# Patient Record
Sex: MALE | Race: BLACK OR AFRICAN AMERICAN | NOT HISPANIC OR LATINO | Employment: FULL TIME | URBAN - METROPOLITAN AREA
[De-identification: names, ages, dates, MRNs, and addresses within clinical notes are randomized per-mention and may not be internally consistent; named-entity substitution may affect disease eponyms.]

---

## 2024-07-05 ENCOUNTER — APPOINTMENT (OUTPATIENT)
Dept: RADIOLOGY | Facility: HOSPITAL | Age: 44
End: 2024-07-05
Payer: MEDICAID

## 2024-07-05 PROBLEM — S72.492B: Status: ACTIVE | Noted: 2024-07-05

## 2024-07-05 PROBLEM — S81.032A: Status: ACTIVE | Noted: 2024-07-05

## 2024-07-05 PROCEDURE — 71045 X-RAY EXAM CHEST 1 VIEW: CPT

## 2024-07-05 PROCEDURE — 73706 CT ANGIO LWR EXTR W/O&W/DYE: CPT | Mod: LT

## 2024-07-05 PROCEDURE — 73590 X-RAY EXAM OF LOWER LEG: CPT | Mod: LT

## 2024-07-05 PROCEDURE — 73564 X-RAY EXAM KNEE 4 OR MORE: CPT | Mod: LT

## 2024-07-05 PROCEDURE — 76377 3D RENDER W/INTRP POSTPROCES: CPT

## 2024-07-05 PROCEDURE — 73560 X-RAY EXAM OF KNEE 1 OR 2: CPT | Mod: LT

## 2024-07-05 PROCEDURE — 73552 X-RAY EXAM OF FEMUR 2/>: CPT | Mod: LT

## 2024-07-06 ENCOUNTER — CLINICAL SUPPORT (OUTPATIENT)
Dept: EMERGENCY MEDICINE | Facility: HOSPITAL | Age: 44
End: 2024-07-06
Payer: MEDICAID

## 2024-07-06 ENCOUNTER — APPOINTMENT (OUTPATIENT)
Dept: RADIOLOGY | Facility: HOSPITAL | Age: 44
End: 2024-07-06
Payer: MEDICAID

## 2024-07-06 PROCEDURE — 93005 ELECTROCARDIOGRAM TRACING: CPT

## 2024-07-14 ENCOUNTER — APPOINTMENT (OUTPATIENT)
Dept: CARDIOLOGY | Facility: HOSPITAL | Age: 44
End: 2024-07-14
Payer: MEDICAID

## 2024-07-14 PROCEDURE — 93005 ELECTROCARDIOGRAM TRACING: CPT

## 2024-07-15 ENCOUNTER — APPOINTMENT (OUTPATIENT)
Dept: CARDIOLOGY | Facility: HOSPITAL | Age: 44
End: 2024-07-15
Payer: MEDICAID

## 2024-07-15 PROCEDURE — 93005 ELECTROCARDIOGRAM TRACING: CPT

## 2024-07-19 ENCOUNTER — PHARMACY VISIT (OUTPATIENT)
Dept: PHARMACY | Facility: CLINIC | Age: 44
End: 2024-07-19
Payer: COMMERCIAL

## 2024-07-19 PROCEDURE — RXMED WILLOW AMBULATORY MEDICATION CHARGE

## 2024-07-23 ENCOUNTER — TELEPHONE (OUTPATIENT)
Dept: IMMUNOLOGY | Facility: CLINIC | Age: 44
End: 2024-07-23
Payer: MEDICAID

## 2024-07-23 NOTE — TELEPHONE ENCOUNTER
Time Spent: 15 mins   Pt referred to [facility] by  (Alison Donahue)   [facility] reached out to pt. Pt available to answer call.  Pt stated that he is currently taking medication and is aware of status.   Pt identified feeling not ready or pressured to f/U for care.   EIS shared contact information to be available at pts request.     PLAN:   EIS will f/U with RN team for another approach to link to care.

## 2024-07-30 ENCOUNTER — OFFICE VISIT (OUTPATIENT)
Dept: ORTHOPEDIC SURGERY | Facility: CLINIC | Age: 44
End: 2024-07-30
Payer: COMMERCIAL

## 2024-07-30 ENCOUNTER — HOSPITAL ENCOUNTER (OUTPATIENT)
Dept: RADIOLOGY | Facility: CLINIC | Age: 44
Discharge: HOME | End: 2024-07-30
Payer: COMMERCIAL

## 2024-07-30 VITALS — WEIGHT: 154 LBS | HEIGHT: 71 IN | BODY MASS INDEX: 21.56 KG/M2

## 2024-07-30 DIAGNOSIS — S72.90XA: Primary | ICD-10-CM

## 2024-07-30 DIAGNOSIS — S72.90XA: ICD-10-CM

## 2024-07-30 PROCEDURE — 73552 X-RAY EXAM OF FEMUR 2/>: CPT | Mod: LT

## 2024-07-30 PROCEDURE — 73552 X-RAY EXAM OF FEMUR 2/>: CPT | Mod: LEFT SIDE | Performed by: RADIOLOGY

## 2024-07-30 PROCEDURE — 73560 X-RAY EXAM OF KNEE 1 OR 2: CPT | Mod: LEFT SIDE | Performed by: RADIOLOGY

## 2024-07-30 PROCEDURE — 99211 OFF/OP EST MAY X REQ PHY/QHP: CPT | Performed by: PHYSICIAN ASSISTANT

## 2024-07-30 PROCEDURE — 73560 X-RAY EXAM OF KNEE 1 OR 2: CPT | Mod: LT

## 2024-07-30 RX ORDER — OXYCODONE HYDROCHLORIDE 5 MG/1
5 TABLET ORAL EVERY 6 HOURS
Qty: 28 TABLET | Refills: 0 | Status: SHIPPED | OUTPATIENT
Start: 2024-07-30 | End: 2024-08-06

## 2024-07-30 ASSESSMENT — PAIN - FUNCTIONAL ASSESSMENT: PAIN_FUNCTIONAL_ASSESSMENT: 0-10

## 2024-07-30 ASSESSMENT — PAIN SCALES - GENERAL: PAINLEVEL_OUTOF10: 10 - WORST POSSIBLE PAIN

## 2024-07-30 ASSESSMENT — PAIN DESCRIPTION - DESCRIPTORS: DESCRIPTORS: ACHING;BURNING;SHARP;SHOOTING;THROBBING
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
## 2024-08-08 ENCOUNTER — APPOINTMENT (OUTPATIENT)
Dept: SURGERY | Facility: CLINIC | Age: 44
End: 2024-08-08
Payer: MEDICAID

## 2024-08-29 ENCOUNTER — APPOINTMENT (OUTPATIENT)
Dept: PRIMARY CARE | Facility: CLINIC | Age: 44
End: 2024-08-29
Payer: MEDICAID

## 2024-09-27 ENCOUNTER — OFFICE VISIT (OUTPATIENT)
Dept: ORTHOPEDIC SURGERY | Facility: HOSPITAL | Age: 44
End: 2024-09-27
Payer: COMMERCIAL

## 2024-09-27 ENCOUNTER — HOSPITAL ENCOUNTER (OUTPATIENT)
Dept: RADIOLOGY | Facility: HOSPITAL | Age: 44
Discharge: HOME | End: 2024-09-27
Payer: COMMERCIAL

## 2024-09-27 DIAGNOSIS — S72.492B: ICD-10-CM

## 2024-09-27 DIAGNOSIS — S72.492B: Primary | ICD-10-CM

## 2024-09-27 PROCEDURE — 73560 X-RAY EXAM OF KNEE 1 OR 2: CPT | Mod: LT

## 2024-09-27 PROCEDURE — 99211 OFF/OP EST MAY X REQ PHY/QHP: CPT | Performed by: PHYSICIAN ASSISTANT

## 2024-09-27 PROCEDURE — 73552 X-RAY EXAM OF FEMUR 2/>: CPT | Mod: LT

## 2024-09-27 ASSESSMENT — PAIN - FUNCTIONAL ASSESSMENT: PAIN_FUNCTIONAL_ASSESSMENT: 0-10

## 2024-09-27 ASSESSMENT — PAIN DESCRIPTION - DESCRIPTORS: DESCRIPTORS: THROBBING

## 2024-09-27 ASSESSMENT — PAIN SCALES - GENERAL: PAINLEVEL_OUTOF10: 10 - WORST POSSIBLE PAIN

## 2024-10-01 NOTE — PROGRESS NOTES
History of Present Illness   Romaine Rodgers is a 44 y.o. male presenting today for post-op check from IMN/ORIF L distal femur fx on 24. Continues to do well and no longer needing narcotics for pain control. Incisions are well healing without redness or drainage. Compliant with WB recommendations. Denies numbness, tingling, f/c, CP, SOB or any other complaints or concerns.      Past Medical History:   Diagnosis Date    HIV disease (Multi)        Medication Documentation Review Audit       Reviewed by Patrica Arriola MA (Medical Assistant) on 24 at 0856      Medication Order Taking? Sig Documenting Provider Last Dose Status   gabapentin (Neurontin) 300 mg capsule [id number]  Take 1 capsule (300 mg) by mouth every 8 hours for 14 days. Yissel Eid MD   24 235   methocarbamol (Robaxin) 750 mg tablet [id number]  Take 1 tablet (750 mg) by mouth every 6 hours for 7 days. Yissel Eid MD   24 235   nicotine (Nicoderm CQ) 14 mg/24 hr patch [id number] Yes Place 1 patch over 24 hours on the skin once daily. Yissel Eid MD Taking Active   OLANZapine (ZyPREXA) 5 mg tablet [id number]  Take 1 tablet (5 mg) by mouth 2 times a day for 14 days. Yissel Eid MD   24 235   traZODone (Desyrel) 50 mg tablet [id number]  Take 0.5 tablets (25 mg) by mouth as needed at bedtime for sleep (only if melatonin was taken and not taking effect before midnight) for up to 14 days. Yissel Eid MD   24 235   valproic acid (Depakene) 250 mg capsule [id number]  Take 2 capsules (500 mg) by mouth 2 times a day for 14 days. Yissel Eid MD   24 235                    No Known Allergies    Social History     Socioeconomic History    Marital status: Single     Spouse name: Not on file    Number of children: Not on file    Years of education: Not on file    Highest education level: Not on file   Occupational History    Not on file   Tobacco Use    Smoking status: Every  Day     Current packs/day: 1.00     Types: Cigarettes    Smokeless tobacco: Not on file   Substance and Sexual Activity    Alcohol use: Yes    Drug use: Yes     Types: Marijuana    Sexual activity: Defer   Other Topics Concern    Not on file   Social History Narrative    Not on file     Social Determinants of Health     Financial Resource Strain: Patient Declined (7/17/2024)    Overall Financial Resource Strain (CARDIA)     Difficulty of Paying Living Expenses: Patient declined   Food Insecurity: Patient Declined (7/17/2024)    Hunger Vital Sign     Worried About Running Out of Food in the Last Year: Patient declined     Ran Out of Food in the Last Year: Patient declined   Transportation Needs: Patient Declined (7/17/2024)    PRAPARE - Transportation     Lack of Transportation (Medical): Patient declined     Lack of Transportation (Non-Medical): Patient declined   Physical Activity: Patient Declined (7/17/2024)    Exercise Vital Sign     Days of Exercise per Week: Patient declined     Minutes of Exercise per Session: Patient declined   Stress: Patient Declined (7/17/2024)    [employer] - Occupational Stress Questionnaire     Feeling of Stress : Patient declined   Social Connections: Patient Declined (7/17/2024)    Social Connection and Isolation Panel [NHANES]     Frequency of Communication with Friends and Family: Patient declined     Frequency of Social Gatherings with Friends and Family: Patient declined     Attends Muslim Services: Patient declined     Active Member of Clubs or Organizations: Patient declined     Attends Club or Organization Meetings: Patient declined     Marital Status: Patient declined   Intimate Partner Violence: Patient Declined (7/17/2024)    Humiliation, Afraid, Rape, and Kick questionnaire     Fear of Current or Ex-Partner: Patient declined     Emotionally Abused: Patient declined     Physically Abused: Patient declined     Sexually Abused: Patient  declined   Housing Stability: Patient Declined (7/17/2024)    Housing Stability Vital Sign     Unable to Pay for Housing in the Last Year: Patient declined     Number of Times Moved in the Last Year: 0     Homeless in the Last Year: Patient declined       History reviewed. No pertinent surgical history.       Review of Systems:  30 point ROS reviewed and negative other than as listed in the HPI     Physical Exam:  Gen: The pt is A&Ox3, NAD, and appear state age and weight  Psychiatric: mood and affect are appropriate   Eyes: sclera are white, EOM grossly intact  ENT: MMM  Neck: supple, thyroid is midline  Respiratory: respirations are nonlabored, chest rise symmetric  CV: rate is regular by palpation of distal pulses  Abdomen: nondistended   Integument: no obvious cutaneous lesions noted. No signs of lymphangitis. No signs of systemic edema.   MSK: left lower leg surgical incision well healing without edema, erythema, drainage, or other s/sx of infection. Appropriately tender to palpation around the incision. SILT throughout the leg intact. Intact plantarflexion and dorsiflexion. Foot warm and well perfused.      Imaging:  I personally reviewed multiple views of the  L knee and femur  were obtained in the office today demonstrate maintenance of reduction, interval healing, and a stable position of the hardware.      Assessment   44 y.o. male post-op from ORIF/IMN L distal femur fx on 7/6/24.     Plan:  Continue WBAT on  LLE and ROM as tolerated ; referral for outpatient PT placed    Follow-up 6 weeks with 2 views left knee and 2 views L femur.     All of the patient's questions/concerns address and they are in agreement with the plan.

## 2024-10-14 ENCOUNTER — APPOINTMENT (OUTPATIENT)
Dept: RADIOLOGY | Facility: HOSPITAL | Age: 44
DRG: 856 | End: 2024-10-14
Payer: COMMERCIAL

## 2024-10-14 ENCOUNTER — HOSPITAL ENCOUNTER (INPATIENT)
Facility: HOSPITAL | Age: 44
LOS: 6 days | Discharge: HOME | End: 2024-10-20
Attending: STUDENT IN AN ORGANIZED HEALTH CARE EDUCATION/TRAINING PROGRAM | Admitting: STUDENT IN AN ORGANIZED HEALTH CARE EDUCATION/TRAINING PROGRAM
Payer: COMMERCIAL

## 2024-10-14 DIAGNOSIS — M00.9 PYOGENIC ARTHRITIS OF LEFT KNEE JOINT, DUE TO UNSPECIFIED ORGANISM (MULTI): ICD-10-CM

## 2024-10-14 DIAGNOSIS — T81.40XS: ICD-10-CM

## 2024-10-14 DIAGNOSIS — J18.9 PNEUMONIA DUE TO INFECTIOUS ORGANISM, UNSPECIFIED LATERALITY, UNSPECIFIED PART OF LUNG: Primary | ICD-10-CM

## 2024-10-14 DIAGNOSIS — B37.0 ORAL THRUSH: ICD-10-CM

## 2024-10-14 DIAGNOSIS — S81.032A GUNSHOT WOUND OF LEFT KNEE, INITIAL ENCOUNTER: ICD-10-CM

## 2024-10-14 DIAGNOSIS — Z21 HIV INFECTION, UNSPECIFIED SYMPTOM STATUS: ICD-10-CM

## 2024-10-14 PROBLEM — R19.7 DIARRHEA OF PRESUMED INFECTIOUS ORIGIN: Status: ACTIVE | Noted: 2024-10-14

## 2024-10-14 PROBLEM — N17.9 AKI (ACUTE KIDNEY INJURY) (CMS-HCC): Status: ACTIVE | Noted: 2024-10-14

## 2024-10-14 PROBLEM — B20: Status: ACTIVE | Noted: 2024-10-14

## 2024-10-14 PROBLEM — F19.10 POLYSUBSTANCE ABUSE (MULTI): Status: ACTIVE | Noted: 2024-10-14

## 2024-10-14 PROBLEM — Z65.9 MEDICATION NONADHERENCE DUE TO PSYCHOSOCIAL PROBLEM: Status: ACTIVE | Noted: 2024-10-14

## 2024-10-14 PROBLEM — E87.6 HYPOKALEMIA: Status: ACTIVE | Noted: 2024-10-14

## 2024-10-14 PROBLEM — F43.10 PTSD (POST-TRAUMATIC STRESS DISORDER): Status: ACTIVE | Noted: 2024-10-14

## 2024-10-14 PROBLEM — Z91.148 MEDICATION NONADHERENCE DUE TO PSYCHOSOCIAL PROBLEM: Status: ACTIVE | Noted: 2024-10-14

## 2024-10-14 PROBLEM — J17: Status: ACTIVE | Noted: 2024-10-14

## 2024-10-14 PROBLEM — F31.9 BIPOLAR 1 DISORDER (MULTI): Status: ACTIVE | Noted: 2024-10-14

## 2024-10-14 LAB
ALBUMIN SERPL BCP-MCNC: 3.2 G/DL (ref 3.4–5)
ALP SERPL-CCNC: 71 U/L (ref 33–120)
ALT SERPL W P-5'-P-CCNC: 6 U/L (ref 10–52)
ANION GAP SERPL CALC-SCNC: 13 MMOL/L (ref 10–20)
AST SERPL W P-5'-P-CCNC: 16 U/L (ref 9–39)
BASOPHILS # BLD AUTO: 0.01 X10*3/UL (ref 0–0.1)
BASOPHILS # BLD AUTO: 0.02 X10*3/UL (ref 0–0.1)
BASOPHILS NFR BLD AUTO: 0.1 %
BASOPHILS NFR BLD AUTO: 0.2 %
BILIRUB SERPL-MCNC: 0.7 MG/DL (ref 0–1.2)
BUN SERPL-MCNC: 22 MG/DL (ref 6–23)
CALCIUM SERPL-MCNC: 9.3 MG/DL (ref 8.6–10.6)
CHLORIDE SERPL-SCNC: 96 MMOL/L (ref 98–107)
CO2 SERPL-SCNC: 25 MMOL/L (ref 21–32)
CREAT SERPL-MCNC: 1.31 MG/DL (ref 0.5–1.3)
EGFRCR SERPLBLD CKD-EPI 2021: 70 ML/MIN/1.73M*2
EOSINOPHIL # BLD AUTO: 0 X10*3/UL (ref 0–0.7)
EOSINOPHIL # BLD AUTO: 0 X10*3/UL (ref 0–0.7)
EOSINOPHIL NFR BLD AUTO: 0 %
EOSINOPHIL NFR BLD AUTO: 0 %
ERYTHROCYTE [DISTWIDTH] IN BLOOD BY AUTOMATED COUNT: 11.9 % (ref 11.5–14.5)
ERYTHROCYTE [DISTWIDTH] IN BLOOD BY AUTOMATED COUNT: 11.9 % (ref 11.5–14.5)
FLUAV RNA RESP QL NAA+PROBE: NOT DETECTED
FLUBV RNA RESP QL NAA+PROBE: NOT DETECTED
GLUCOSE SERPL-MCNC: 115 MG/DL (ref 74–99)
HCT VFR BLD AUTO: 37.3 % (ref 41–52)
HCT VFR BLD AUTO: 37.6 % (ref 41–52)
HCV AB SER QL: NONREACTIVE
HGB BLD-MCNC: 12.8 G/DL (ref 13.5–17.5)
HGB BLD-MCNC: 12.9 G/DL (ref 13.5–17.5)
HOLD SPECIMEN: NORMAL
HOLD SPECIMEN: NORMAL
IMM GRANULOCYTES # BLD AUTO: 0.05 X10*3/UL (ref 0–0.7)
IMM GRANULOCYTES # BLD AUTO: 0.05 X10*3/UL (ref 0–0.7)
IMM GRANULOCYTES NFR BLD AUTO: 0.4 % (ref 0–0.9)
IMM GRANULOCYTES NFR BLD AUTO: 0.4 % (ref 0–0.9)
LYMPHOCYTES # BLD AUTO: 0.51 X10*3/UL (ref 1.2–4.8)
LYMPHOCYTES # BLD AUTO: 0.55 X10*3/UL (ref 1.2–4.8)
LYMPHOCYTES NFR BLD AUTO: 4.5 %
LYMPHOCYTES NFR BLD AUTO: 4.9 %
MCH RBC QN AUTO: 33.4 PG (ref 26–34)
MCH RBC QN AUTO: 33.4 PG (ref 26–34)
MCHC RBC AUTO-ENTMCNC: 34.3 G/DL (ref 32–36)
MCHC RBC AUTO-ENTMCNC: 34.3 G/DL (ref 32–36)
MCV RBC AUTO: 97 FL (ref 80–100)
MCV RBC AUTO: 97 FL (ref 80–100)
MONOCYTES # BLD AUTO: 0.55 X10*3/UL (ref 0.1–1)
MONOCYTES # BLD AUTO: 0.57 X10*3/UL (ref 0.1–1)
MONOCYTES NFR BLD AUTO: 4.9 %
MONOCYTES NFR BLD AUTO: 5 %
NEUTROPHILS # BLD AUTO: 10 X10*3/UL (ref 1.2–7.7)
NEUTROPHILS # BLD AUTO: 10.29 X10*3/UL (ref 1.2–7.7)
NEUTROPHILS NFR BLD AUTO: 89.7 %
NEUTROPHILS NFR BLD AUTO: 89.9 %
NRBC BLD-RTO: 0 /100 WBCS (ref 0–0)
NRBC BLD-RTO: 0 /100 WBCS (ref 0–0)
PLATELET # BLD AUTO: 232 X10*3/UL (ref 150–450)
PLATELET # BLD AUTO: 249 X10*3/UL (ref 150–450)
POTASSIUM SERPL-SCNC: 3 MMOL/L (ref 3.5–5.3)
PROT SERPL-MCNC: 8.9 G/DL (ref 6.4–8.2)
PROT SERPL-MCNC: 9 G/DL (ref 6.4–8.2)
RBC # BLD AUTO: 3.83 X10*6/UL (ref 4.5–5.9)
RBC # BLD AUTO: 3.86 X10*6/UL (ref 4.5–5.9)
RBC MORPH BLD: NORMAL
RBC MORPH BLD: NORMAL
SARS-COV-2 RNA RESP QL NAA+PROBE: NOT DETECTED
SODIUM SERPL-SCNC: 131 MMOL/L (ref 136–145)
WBC # BLD AUTO: 11.2 X10*3/UL (ref 4.4–11.3)
WBC # BLD AUTO: 11.4 X10*3/UL (ref 4.4–11.3)

## 2024-10-14 PROCEDURE — 99285 EMERGENCY DEPT VISIT HI MDM: CPT | Mod: 25

## 2024-10-14 PROCEDURE — 71250 CT THORAX DX C-: CPT | Performed by: STUDENT IN AN ORGANIZED HEALTH CARE EDUCATION/TRAINING PROGRAM

## 2024-10-14 PROCEDURE — 2500000004 HC RX 250 GENERAL PHARMACY W/ HCPCS (ALT 636 FOR OP/ED)

## 2024-10-14 PROCEDURE — 99285 EMERGENCY DEPT VISIT HI MDM: CPT | Performed by: STUDENT IN AN ORGANIZED HEALTH CARE EDUCATION/TRAINING PROGRAM

## 2024-10-14 PROCEDURE — 2500000001 HC RX 250 WO HCPCS SELF ADMINISTERED DRUGS (ALT 637 FOR MEDICARE OP)

## 2024-10-14 PROCEDURE — 86780 TREPONEMA PALLIDUM: CPT | Performed by: NUTRITIONIST

## 2024-10-14 PROCEDURE — 87636 SARSCOV2 & INF A&B AMP PRB: CPT

## 2024-10-14 PROCEDURE — 84165 PROTEIN E-PHORESIS SERUM: CPT

## 2024-10-14 PROCEDURE — 87109 MYCOPLASMA: CPT

## 2024-10-14 PROCEDURE — 84155 ASSAY OF PROTEIN SERUM: CPT

## 2024-10-14 PROCEDURE — 86803 HEPATITIS C AB TEST: CPT

## 2024-10-14 PROCEDURE — 71046 X-RAY EXAM CHEST 2 VIEWS: CPT

## 2024-10-14 PROCEDURE — 36415 COLL VENOUS BLD VENIPUNCTURE: CPT

## 2024-10-14 PROCEDURE — 87536 HIV-1 QUANT&REVRSE TRNSCRPJ: CPT

## 2024-10-14 PROCEDURE — 73590 X-RAY EXAM OF LOWER LEG: CPT | Mod: LT

## 2024-10-14 PROCEDURE — 1100000001 HC PRIVATE ROOM DAILY

## 2024-10-14 PROCEDURE — 86334 IMMUNOFIX E-PHORESIS SERUM: CPT

## 2024-10-14 PROCEDURE — 96365 THER/PROPH/DIAG IV INF INIT: CPT

## 2024-10-14 PROCEDURE — 96367 TX/PROPH/DG ADDL SEQ IV INF: CPT

## 2024-10-14 PROCEDURE — 87899 AGENT NOS ASSAY W/OPTIC: CPT

## 2024-10-14 PROCEDURE — 87081 CULTURE SCREEN ONLY: CPT | Performed by: STUDENT IN AN ORGANIZED HEALTH CARE EDUCATION/TRAINING PROGRAM

## 2024-10-14 PROCEDURE — 85025 COMPLETE CBC W/AUTO DIFF WBC: CPT

## 2024-10-14 PROCEDURE — 73552 X-RAY EXAM OF FEMUR 2/>: CPT | Mod: LEFT SIDE | Performed by: RADIOLOGY

## 2024-10-14 PROCEDURE — 2500000002 HC RX 250 W HCPCS SELF ADMINISTERED DRUGS (ALT 637 FOR MEDICARE OP, ALT 636 FOR OP/ED)

## 2024-10-14 PROCEDURE — 88185 FLOWCYTOMETRY/TC ADD-ON: CPT

## 2024-10-14 PROCEDURE — 80053 COMPREHEN METABOLIC PANEL: CPT

## 2024-10-14 PROCEDURE — 73564 X-RAY EXAM KNEE 4 OR MORE: CPT | Mod: LEFT SIDE | Performed by: RADIOLOGY

## 2024-10-14 PROCEDURE — 87449 NOS EACH ORGANISM AG IA: CPT

## 2024-10-14 PROCEDURE — 71046 X-RAY EXAM CHEST 2 VIEWS: CPT | Performed by: RADIOLOGY

## 2024-10-14 PROCEDURE — 2500000005 HC RX 250 GENERAL PHARMACY W/O HCPCS

## 2024-10-14 PROCEDURE — 73564 X-RAY EXAM KNEE 4 OR MORE: CPT | Mod: LT

## 2024-10-14 PROCEDURE — 71250 CT THORAX DX C-: CPT

## 2024-10-14 PROCEDURE — 96375 TX/PRO/DX INJ NEW DRUG ADDON: CPT

## 2024-10-14 PROCEDURE — 73552 X-RAY EXAM OF FEMUR 2/>: CPT | Mod: LT

## 2024-10-14 PROCEDURE — 2500000004 HC RX 250 GENERAL PHARMACY W/ HCPCS (ALT 636 FOR OP/ED): Performed by: STUDENT IN AN ORGANIZED HEALTH CARE EDUCATION/TRAINING PROGRAM

## 2024-10-14 PROCEDURE — 73590 X-RAY EXAM OF LOWER LEG: CPT | Mod: LEFT SIDE | Performed by: RADIOLOGY

## 2024-10-14 PROCEDURE — 99223 1ST HOSP IP/OBS HIGH 75: CPT

## 2024-10-14 PROCEDURE — 86320 SERUM IMMUNOELECTROPHORESIS: CPT

## 2024-10-14 PROCEDURE — 87205 SMEAR GRAM STAIN: CPT

## 2024-10-14 PROCEDURE — 2500000001 HC RX 250 WO HCPCS SELF ADMINISTERED DRUGS (ALT 637 FOR MEDICARE OP): Performed by: STUDENT IN AN ORGANIZED HEALTH CARE EDUCATION/TRAINING PROGRAM

## 2024-10-14 RX ORDER — ONDANSETRON 4 MG/1
4 TABLET, ORALLY DISINTEGRATING ORAL ONCE
Status: COMPLETED | OUTPATIENT
Start: 2024-10-14 | End: 2024-10-14

## 2024-10-14 RX ORDER — GABAPENTIN 300 MG/1
300 CAPSULE ORAL EVERY 8 HOURS SCHEDULED
Status: DISCONTINUED | OUTPATIENT
Start: 2024-10-14 | End: 2024-10-20 | Stop reason: HOSPADM

## 2024-10-14 RX ORDER — ENOXAPARIN SODIUM 100 MG/ML
40 INJECTION SUBCUTANEOUS EVERY 24 HOURS
Status: DISCONTINUED | OUTPATIENT
Start: 2024-10-14 | End: 2024-10-20 | Stop reason: HOSPADM

## 2024-10-14 RX ORDER — ACETAMINOPHEN 325 MG/1
650 TABLET ORAL EVERY 4 HOURS PRN
Status: DISCONTINUED | OUTPATIENT
Start: 2024-10-14 | End: 2024-10-18

## 2024-10-14 RX ORDER — POLYETHYLENE GLYCOL 3350 17 G/17G
17 POWDER, FOR SOLUTION ORAL DAILY
Status: DISCONTINUED | OUTPATIENT
Start: 2024-10-14 | End: 2024-10-20 | Stop reason: HOSPADM

## 2024-10-14 RX ORDER — POTASSIUM CHLORIDE 20 MEQ/1
40 TABLET, EXTENDED RELEASE ORAL ONCE
Status: COMPLETED | OUTPATIENT
Start: 2024-10-14 | End: 2024-10-14

## 2024-10-14 RX ORDER — VALPROIC ACID 250 MG/1
500 CAPSULE, LIQUID FILLED ORAL 2 TIMES DAILY
Status: DISCONTINUED | OUTPATIENT
Start: 2024-10-14 | End: 2024-10-17

## 2024-10-14 RX ORDER — POTASSIUM CHLORIDE 20 MEQ/1
20 TABLET, EXTENDED RELEASE ORAL ONCE
Status: COMPLETED | OUTPATIENT
Start: 2024-10-14 | End: 2024-10-14

## 2024-10-14 RX ORDER — ACETAMINOPHEN 325 MG/1
975 TABLET ORAL ONCE
Status: COMPLETED | OUTPATIENT
Start: 2024-10-14 | End: 2024-10-14

## 2024-10-14 RX ORDER — TRAZODONE HYDROCHLORIDE 50 MG/1
25 TABLET ORAL NIGHTLY PRN
Status: DISCONTINUED | OUTPATIENT
Start: 2024-10-14 | End: 2024-10-20 | Stop reason: HOSPADM

## 2024-10-14 RX ORDER — AZITHROMYCIN 500 MG/1
500 TABLET, FILM COATED ORAL
Status: DISCONTINUED | OUTPATIENT
Start: 2024-10-15 | End: 2024-10-17

## 2024-10-14 RX ORDER — CEFTRIAXONE 1 G/50ML
1 INJECTION, SOLUTION INTRAVENOUS ONCE
Status: COMPLETED | OUTPATIENT
Start: 2024-10-14 | End: 2024-10-14

## 2024-10-14 RX ORDER — KETOROLAC TROMETHAMINE 30 MG/ML
30 INJECTION, SOLUTION INTRAMUSCULAR; INTRAVENOUS ONCE
Status: COMPLETED | OUTPATIENT
Start: 2024-10-14 | End: 2024-10-14

## 2024-10-14 RX ORDER — POTASSIUM CHLORIDE 20 MEQ/1
40 TABLET, EXTENDED RELEASE ORAL ONCE
Status: DISCONTINUED | OUTPATIENT
Start: 2024-10-14 | End: 2024-10-14

## 2024-10-14 RX ORDER — OLANZAPINE 5 MG/1
5 TABLET ORAL 2 TIMES DAILY
Status: DISCONTINUED | OUTPATIENT
Start: 2024-10-14 | End: 2024-10-20 | Stop reason: HOSPADM

## 2024-10-14 RX ORDER — ONDANSETRON HYDROCHLORIDE 2 MG/ML
4 INJECTION, SOLUTION INTRAVENOUS ONCE
Status: COMPLETED | OUTPATIENT
Start: 2024-10-14 | End: 2024-10-14

## 2024-10-14 RX ORDER — CEFTRIAXONE 1 G/50ML
1 INJECTION, SOLUTION INTRAVENOUS EVERY 24 HOURS
Status: DISCONTINUED | OUTPATIENT
Start: 2024-10-15 | End: 2024-10-16

## 2024-10-14 ASSESSMENT — PAIN DESCRIPTION - ORIENTATION
ORIENTATION: LEFT
ORIENTATION: LEFT

## 2024-10-14 ASSESSMENT — PAIN SCALES - GENERAL
PAINLEVEL_OUTOF10: 6
PAINLEVEL_OUTOF10: 10 - WORST POSSIBLE PAIN

## 2024-10-14 ASSESSMENT — PAIN DESCRIPTION - PAIN TYPE
TYPE: CHRONIC PAIN
TYPE: CHRONIC PAIN

## 2024-10-14 ASSESSMENT — PAIN - FUNCTIONAL ASSESSMENT
PAIN_FUNCTIONAL_ASSESSMENT: 0-10
PAIN_FUNCTIONAL_ASSESSMENT: 0-10

## 2024-10-14 ASSESSMENT — LIFESTYLE VARIABLES
EVER FELT BAD OR GUILTY ABOUT YOUR DRINKING: NO
HAVE YOU EVER FELT YOU SHOULD CUT DOWN ON YOUR DRINKING: NO
EVER HAD A DRINK FIRST THING IN THE MORNING TO STEADY YOUR NERVES TO GET RID OF A HANGOVER: NO
HAVE PEOPLE ANNOYED YOU BY CRITICIZING YOUR DRINKING: NO
TOTAL SCORE: 0

## 2024-10-14 ASSESSMENT — PAIN DESCRIPTION - LOCATION
LOCATION: LEG
LOCATION: LEG

## 2024-10-14 ASSESSMENT — COLUMBIA-SUICIDE SEVERITY RATING SCALE - C-SSRS
1. IN THE PAST MONTH, HAVE YOU WISHED YOU WERE DEAD OR WISHED YOU COULD GO TO SLEEP AND NOT WAKE UP?: NO
6. HAVE YOU EVER DONE ANYTHING, STARTED TO DO ANYTHING, OR PREPARED TO DO ANYTHING TO END YOUR LIFE?: NO
2. HAVE YOU ACTUALLY HAD ANY THOUGHTS OF KILLING YOURSELF?: NO
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
## 2024-10-15 LAB
ALBUMIN SERPL BCP-MCNC: 2.9 G/DL (ref 3.4–5)
ANION GAP SERPL CALC-SCNC: 14 MMOL/L (ref 10–20)
BACTERIA SPEC RESP CULT: ABNORMAL
BASOPHILS # BLD AUTO: 0.01 X10*3/UL (ref 0–0.1)
BASOPHILS NFR BLD AUTO: 0.1 %
BUN SERPL-MCNC: 26 MG/DL (ref 6–23)
CALCIUM SERPL-MCNC: 9.4 MG/DL (ref 8.6–10.6)
CD3+CD4+ CELLS # BLD: 0.18 X10E9/L
CD3+CD4+ CELLS # BLD: 179 /MM3
CD3+CD4+ CELLS NFR BLD: 35 %
CD3+CD4+ CELLS/CD3+CD8+ CLL BLD: 1 %
CD3+CD8+ CELLS # BLD: 0.18 X10E9/L
CD3+CD8+ CELLS NFR BLD: 35 %
CHLORIDE SERPL-SCNC: 100 MMOL/L (ref 98–107)
CO2 SERPL-SCNC: 23 MMOL/L (ref 21–32)
CREAT SERPL-MCNC: 1.3 MG/DL (ref 0.5–1.3)
EGFRCR SERPLBLD CKD-EPI 2021: 70 ML/MIN/1.73M*2
EOSINOPHIL # BLD AUTO: 0.02 X10*3/UL (ref 0–0.7)
EOSINOPHIL NFR BLD AUTO: 0.2 %
ERYTHROCYTE [DISTWIDTH] IN BLOOD BY AUTOMATED COUNT: 11.8 % (ref 11.5–14.5)
GLUCOSE SERPL-MCNC: 116 MG/DL (ref 74–99)
GRAM STN SPEC: ABNORMAL
HCT VFR BLD AUTO: 34.7 % (ref 41–52)
HGB BLD-MCNC: 11.8 G/DL (ref 13.5–17.5)
HIV1 RNA # PLAS NAA DL=20: ABNORMAL COPIES/ML
HIV1 RNA # PLAS NAA DL=20: DETECTED {COPIES}/ML
HIV1 RNA SPEC NAA+PROBE-LOG#: 4.28 LOG10 CPY/ML
IMM GRANULOCYTES # BLD AUTO: 0.05 X10*3/UL (ref 0–0.7)
IMM GRANULOCYTES NFR BLD AUTO: 0.6 % (ref 0–0.9)
LEGIONELLA AG UR QL: NEGATIVE
LYMPHOCYTES # BLD AUTO: 0.6 X10*3/UL (ref 1.2–4.8)
LYMPHOCYTES # SPEC AUTO: 0.51 X10*3/UL
LYMPHOCYTES NFR BLD AUTO: 7 %
MAGNESIUM SERPL-MCNC: 2.05 MG/DL (ref 1.6–2.4)
MCH RBC QN AUTO: 33.1 PG (ref 26–34)
MCHC RBC AUTO-ENTMCNC: 34 G/DL (ref 32–36)
MCV RBC AUTO: 98 FL (ref 80–100)
MONOCYTES # BLD AUTO: 0.52 X10*3/UL (ref 0.1–1)
MONOCYTES NFR BLD AUTO: 6.1 %
NEUTROPHILS # BLD AUTO: 7.36 X10*3/UL (ref 1.2–7.7)
NEUTROPHILS NFR BLD AUTO: 86 %
NRBC BLD-RTO: 0 /100 WBCS (ref 0–0)
PHOSPHATE SERPL-MCNC: 2.4 MG/DL (ref 2.5–4.9)
PLATELET # BLD AUTO: 265 X10*3/UL (ref 150–450)
POTASSIUM SERPL-SCNC: 3.1 MMOL/L (ref 3.5–5.3)
RBC # BLD AUTO: 3.56 X10*6/UL (ref 4.5–5.9)
S PNEUM AG UR QL: POSITIVE
SODIUM SERPL-SCNC: 134 MMOL/L (ref 136–145)
WBC # BLD AUTO: 8.6 X10*3/UL (ref 4.4–11.3)

## 2024-10-15 PROCEDURE — 86140 C-REACTIVE PROTEIN: CPT

## 2024-10-15 PROCEDURE — 87328 CRYPTOSPORIDIUM AG IA: CPT

## 2024-10-15 PROCEDURE — 36415 COLL VENOUS BLD VENIPUNCTURE: CPT

## 2024-10-15 PROCEDURE — 80069 RENAL FUNCTION PANEL: CPT

## 2024-10-15 PROCEDURE — 2500000001 HC RX 250 WO HCPCS SELF ADMINISTERED DRUGS (ALT 637 FOR MEDICARE OP): Performed by: STUDENT IN AN ORGANIZED HEALTH CARE EDUCATION/TRAINING PROGRAM

## 2024-10-15 PROCEDURE — 2500000001 HC RX 250 WO HCPCS SELF ADMINISTERED DRUGS (ALT 637 FOR MEDICARE OP)

## 2024-10-15 PROCEDURE — 99232 SBSQ HOSP IP/OBS MODERATE 35: CPT

## 2024-10-15 PROCEDURE — 2500000002 HC RX 250 W HCPCS SELF ADMINISTERED DRUGS (ALT 637 FOR MEDICARE OP, ALT 636 FOR OP/ED)

## 2024-10-15 PROCEDURE — 83735 ASSAY OF MAGNESIUM: CPT

## 2024-10-15 PROCEDURE — 87329 GIARDIA AG IA: CPT

## 2024-10-15 PROCEDURE — 85025 COMPLETE CBC W/AUTO DIFF WBC: CPT

## 2024-10-15 PROCEDURE — 1100000001 HC PRIVATE ROOM DAILY

## 2024-10-15 PROCEDURE — 2500000004 HC RX 250 GENERAL PHARMACY W/ HCPCS (ALT 636 FOR OP/ED)

## 2024-10-15 RX ORDER — POTASSIUM CHLORIDE 20 MEQ/1
40 TABLET, EXTENDED RELEASE ORAL 2 TIMES DAILY
Status: COMPLETED | OUTPATIENT
Start: 2024-10-15 | End: 2024-10-15

## 2024-10-15 SDOH — ECONOMIC STABILITY: HOUSING INSECURITY: AT ANY TIME IN THE PAST 12 MONTHS, WERE YOU HOMELESS OR LIVING IN A SHELTER (INCLUDING NOW)?: NO

## 2024-10-15 SDOH — ECONOMIC STABILITY: FOOD INSECURITY: WITHIN THE PAST 12 MONTHS, THE FOOD YOU BOUGHT JUST DIDN'T LAST AND YOU DIDN'T HAVE MONEY TO GET MORE.: NEVER TRUE

## 2024-10-15 SDOH — SOCIAL STABILITY: SOCIAL INSECURITY: WITHIN THE LAST YEAR, HAVE YOU BEEN HUMILIATED OR EMOTIONALLY ABUSED IN OTHER WAYS BY YOUR PARTNER OR EX-PARTNER?: NO

## 2024-10-15 SDOH — SOCIAL STABILITY: SOCIAL INSECURITY
WITHIN THE LAST YEAR, HAVE YOU BEEN RAPED OR FORCED TO HAVE ANY KIND OF SEXUAL ACTIVITY BY YOUR PARTNER OR EX-PARTNER?: NO

## 2024-10-15 SDOH — ECONOMIC STABILITY: HOUSING INSECURITY: IN THE LAST 12 MONTHS, WAS THERE A TIME WHEN YOU WERE NOT ABLE TO PAY THE MORTGAGE OR RENT ON TIME?: NO

## 2024-10-15 SDOH — SOCIAL STABILITY: SOCIAL INSECURITY: ARE THERE ANY APPARENT SIGNS OF INJURIES/BEHAVIORS THAT COULD BE RELATED TO ABUSE/NEGLECT?: NO

## 2024-10-15 SDOH — SOCIAL STABILITY: SOCIAL INSECURITY: HAS ANYONE EVER THREATENED TO HURT YOUR FAMILY OR YOUR PETS?: NO

## 2024-10-15 SDOH — SOCIAL STABILITY: SOCIAL INSECURITY: ARE YOU OR HAVE YOU BEEN THREATENED OR ABUSED PHYSICALLY, EMOTIONALLY, OR SEXUALLY BY ANYONE?: NO

## 2024-10-15 SDOH — SOCIAL STABILITY: SOCIAL INSECURITY
WITHIN THE LAST YEAR, HAVE YOU BEEN KICKED, HIT, SLAPPED, OR OTHERWISE PHYSICALLY HURT BY YOUR PARTNER OR EX-PARTNER?: NO

## 2024-10-15 SDOH — SOCIAL STABILITY: SOCIAL INSECURITY: DO YOU FEEL UNSAFE GOING BACK TO THE PLACE WHERE YOU ARE LIVING?: NO

## 2024-10-15 SDOH — SOCIAL STABILITY: SOCIAL INSECURITY: WITHIN THE LAST YEAR, HAVE YOU BEEN AFRAID OF YOUR PARTNER OR EX-PARTNER?: NO

## 2024-10-15 SDOH — ECONOMIC STABILITY: FOOD INSECURITY: HOW HARD IS IT FOR YOU TO PAY FOR THE VERY BASICS LIKE FOOD, HOUSING, MEDICAL CARE, AND HEATING?: NOT HARD AT ALL

## 2024-10-15 SDOH — ECONOMIC STABILITY: INCOME INSECURITY: IN THE PAST 12 MONTHS HAS THE ELECTRIC, GAS, OIL, OR WATER COMPANY THREATENED TO SHUT OFF SERVICES IN YOUR HOME?: YES

## 2024-10-15 SDOH — SOCIAL STABILITY: SOCIAL INSECURITY: DOES ANYONE TRY TO KEEP YOU FROM HAVING/CONTACTING OTHER FRIENDS OR DOING THINGS OUTSIDE YOUR HOME?: NO

## 2024-10-15 SDOH — ECONOMIC STABILITY: FOOD INSECURITY: WITHIN THE PAST 12 MONTHS, YOU WORRIED THAT YOUR FOOD WOULD RUN OUT BEFORE YOU GOT THE MONEY TO BUY MORE.: NEVER TRUE

## 2024-10-15 SDOH — SOCIAL STABILITY: SOCIAL INSECURITY: DO YOU FEEL ANYONE HAS EXPLOITED OR TAKEN ADVANTAGE OF YOU FINANCIALLY OR OF YOUR PERSONAL PROPERTY?: NO

## 2024-10-15 SDOH — SOCIAL STABILITY: SOCIAL INSECURITY: HAVE YOU HAD THOUGHTS OF HARMING ANYONE ELSE?: NO

## 2024-10-15 SDOH — ECONOMIC STABILITY: HOUSING INSECURITY: IN THE PAST 12 MONTHS, HOW MANY TIMES HAVE YOU MOVED WHERE YOU WERE LIVING?: 0

## 2024-10-15 SDOH — SOCIAL STABILITY: SOCIAL INSECURITY: ABUSE: ADULT

## 2024-10-15 SDOH — ECONOMIC STABILITY: TRANSPORTATION INSECURITY: IN THE PAST 12 MONTHS, HAS LACK OF TRANSPORTATION KEPT YOU FROM MEDICAL APPOINTMENTS OR FROM GETTING MEDICATIONS?: NO

## 2024-10-15 SDOH — SOCIAL STABILITY: SOCIAL INSECURITY: HAVE YOU HAD ANY THOUGHTS OF HARMING ANYONE ELSE?: NO

## 2024-10-15 ASSESSMENT — COGNITIVE AND FUNCTIONAL STATUS - GENERAL
MOBILITY SCORE: 24
PATIENT BASELINE BEDBOUND: NO
DAILY ACTIVITIY SCORE: 24
MOBILITY SCORE: 22
CLIMB 3 TO 5 STEPS WITH RAILING: A LITTLE
DAILY ACTIVITIY SCORE: 24
WALKING IN HOSPITAL ROOM: A LITTLE

## 2024-10-15 ASSESSMENT — LIFESTYLE VARIABLES
HOW OFTEN DO YOU HAVE 6 OR MORE DRINKS ON ONE OCCASION: LESS THAN MONTHLY
HOW MANY STANDARD DRINKS CONTAINING ALCOHOL DO YOU HAVE ON A TYPICAL DAY: 1 OR 2
SKIP TO QUESTIONS 9-10: 0
AUDIT-C TOTAL SCORE: 2
AUDIT-C TOTAL SCORE: 2
HOW OFTEN DO YOU HAVE A DRINK CONTAINING ALCOHOL: MONTHLY OR LESS

## 2024-10-15 ASSESSMENT — PAIN - FUNCTIONAL ASSESSMENT
PAIN_FUNCTIONAL_ASSESSMENT: 0-10
PAIN_FUNCTIONAL_ASSESSMENT: 0-10

## 2024-10-15 ASSESSMENT — ACTIVITIES OF DAILY LIVING (ADL)
WALKS IN HOME: INDEPENDENT
ADEQUATE_TO_COMPLETE_ADL: YES
LACK_OF_TRANSPORTATION: NO
GROOMING: INDEPENDENT
LACK_OF_TRANSPORTATION: NO
TOILETING: INDEPENDENT
FEEDING YOURSELF: INDEPENDENT
JUDGMENT_ADEQUATE_SAFELY_COMPLETE_DAILY_ACTIVITIES: YES
DRESSING YOURSELF: INDEPENDENT
HEARING - LEFT EAR: FUNCTIONAL
LACK_OF_TRANSPORTATION: NO
LACK_OF_TRANSPORTATION: NO
HEARING - RIGHT EAR: FUNCTIONAL
BATHING: INDEPENDENT
PATIENT'S MEMORY ADEQUATE TO SAFELY COMPLETE DAILY ACTIVITIES?: YES

## 2024-10-15 ASSESSMENT — PATIENT HEALTH QUESTIONNAIRE - PHQ9
SUM OF ALL RESPONSES TO PHQ9 QUESTIONS 1 & 2: 0
1. LITTLE INTEREST OR PLEASURE IN DOING THINGS: NOT AT ALL
2. FEELING DOWN, DEPRESSED OR HOPELESS: NOT AT ALL

## 2024-10-15 ASSESSMENT — PAIN SCALES - GENERAL
PAINLEVEL_OUTOF10: 0 - NO PAIN
PAINLEVEL_OUTOF10: 0 - NO PAIN

## 2024-10-15 NOTE — PROGRESS NOTES
10/15/24 1138   Discharge Planning   Living Arrangements Alone   Support Systems Parent   Type of Residence Private residence   Do you have animals or pets at home? Yes   Type of Animals or Pets cat   Home or Post Acute Services None   Expected Discharge Disposition Home   Does the patient need discharge transport arranged? No   Financial Resource Strain   How hard is it for you to pay for the very basics like food, housing, medical care, and heating? Somewhat  (Patient stated he did not need any additional assistance at this time. Currently in a step forward program that helps with housing.)   Housing Stability   In the last 12 months, was there a time when you were not able to pay the mortgage or rent on time? N   At any time in the past 12 months, were you homeless or living in a shelter (including now)? N   Transportation Needs   In the past 12 months, has lack of transportation kept you from medical appointments or from getting medications? no   In the past 12 months, has lack of transportation kept you from meetings, work, or from getting things needed for daily living? No     Payor: [employer]    Discharge disposition: Home    Potential Barriers: Pt states financial strain. Patient made aware of community health resources but declined any further assitance at this time.    ADOD: 10/21/2024         Previous Home Care:  None    DME: Crutches ( Patient has a pair at bedside)    Pharmacy: [facility]    Falls: None    PCP:  Nolberto Quintanilla Patient states last seen last year.    Met with Patient and introduced self and role. Patient states he lives in a private residence/ Apartment on the 7th floor. Pt states he is able to easily get to apartment due to elevator despite crutches. Pt reports no recent falls. Patient states he feels safe at home. Patient states he will take a uber ride home at discharge and is able to call to set it up. Patient stated he gets to appointments on the bus but denies any  further assistance with transportation at this time.  Patient states he will schedule his own follow up appointment with PCP after discharge. Patient states that he uses alcohol and drugs on a daily basis but denies any further assistance at this time. This TCC will continue to monitor and update with any changes.         TREVER Coles, RN    Transitional Care Coordinator    [place] [address]    O: [phone]  [email or web address]".[facility]

## 2024-10-15 NOTE — PROGRESS NOTES
Romaine Rodgers is a 44 y.o. male on day 1 of admission presenting with Bacterial pneumonia with HIV infection (Multi).      Subjective   -NAEO  -patient alert and ambulating around room with difficulty this morning, reports improvement in chest pain, dyspnea       Objective     Last Recorded Vitals  /67   Pulse (!) 111   Temp 37.4 °C (99.3 °F)   Resp 18   Wt 69.9 kg (154 lb)   SpO2 97%   Intake/Output last 3 Shifts:    Intake/Output Summary (Last 24 hours) at 10/15/2024 1513  Last data filed at 10/15/2024 1100  Gross per 24 hour   Intake 50 ml   Output --   Net 50 ml       Admission Weight  Weight: 69.9 kg (154 lb) (10/14/24 0300)    Daily Weight  10/14/24 : 69.9 kg (154 lb)    Image Results  CT chest wo IV contrast  Narrative: Interpreted By:  Dano Correa,   STUDY:  CT CHEST WO IV CONTRAST;  10/14/2024 11:25 am      INDICATION:  Signs/Symptoms:evaluate for opportunistic infection RLL PNA HIV  positive.      COMPARISON:  Prior cross-sectional imaging of chest on PACS for comparison. There  are chest radiograph from earlier same day.      ACCESSION NUMBER(S):  [id number]      ORDERING CLINICIAN:  CACHORRO MA      TECHNIQUE:  Helical data acquisition of the chest was obtained without  intravenous contrast. Images were reformatted in axial, coronal, and  sagittal planes.      FINDINGS:  LUNGS AND AIRWAYS:  The trachea and central airways are patent. No endobronchial lesion  is seen.      There is demonstration of fairly large mixed consolidative and  ground-glass opacity within right lower lobe (for example image 242,  series 202),, most consistent with underlying infectious process.  Remainder of bilateral lungs are clear without additional  consolidative opacities, pleural effusion or pneumothorax. There is  mild centrilobular and paraseptal emphysematous changes noted  bilaterally. There are few solid nodular densities along bilateral  interlobar fissures, likely representing benign  intrapulmonary lymph  nodes.      MEDIASTINUM AND YOEL, LOWER NECK AND AXILLA:  The visualized thyroid gland is within normal limits.  No evidence of thoracic lymphadenopathy by CT criteria. Few small  subcentimeter sized mediastinal lymph nodes are felt to be reactive  in nature. Esophagus appears within normal limits as seen.      HEART AND VESSELS:  The thoracic aorta normal in course and caliber.  Main pulmonary artery and its branches are normal in caliber.  No coronary artery calcifications are seen. Please note, the study is  not optimized for evaluation of coronary arteries. The cardiac  chambers are not enlarged. There is a small pericardial effusion  present.      UPPER ABDOMEN:  The visualized subdiaphragmatic structures demonstrate no remarkable  findings.      CHEST WALL AND OSSEOUS STRUCTURES:  Chest wall is within normal limits.  No acute osseous pathology.There are no suspicious osseous  lesions.Mild multilevel degenerative changes within visualized spine.      Impression: 1.  A fairly large mixed consolidative and ground-glass opacity  within right lower lobe, most consistent with underlying infectious  process. Radiographic follow-up till resolution is recommended.  2. Mild centrilobular and paraseptal emphysema within bilateral lungs.  3. Small pericardial effusion.      Signed by: Dano Correa 10/14/2024 11:31 AM  Dictation workstation:   [id number]  XR chest 2 views  Narrative: Interpreted By:  Pascual Verma and Sheng Max   STUDY:  XR CHEST 2 VIEWS;  10/14/2024 5:00 am      INDICATION:  Signs/Symptoms:cough, congestion. WBC 11.4 K..          COMPARISON:  Chest radiograph dated 07/05/2024      ACCESSION NUMBER(S):  [id number]      ORDERING CLINICIAN:  ERIC CRYSTAL      FINDINGS:  PA and lateral radiograph of the chest:      CARDIOMEDIASTINAL SILHOUETTE:  The cardiomediastinal silhouette is normal in size and configuration.      LUNGS:  Increased air space opacity in the right lower lobe is  concerning for  developing pneumonia. No pleural effusion, or pneumothorax.      ABDOMEN:  No remarkable upper abdominal findings.      BONES:  No acute osseous abnormality.      Impression: 1. Increased air space opacity in the right lower lobe in the setting  of leukocytosis is suggestive of developing pneumonia.          I personally reviewed the images/study and I agree with the findings  as stated by Dr. Brandt Snow. This study was interpreted at [facility], [place], [place].      MACRO:  None      Signed by: Pascual Verma 10/14/2024 7:20 AM  Dictation workstation:   [id number]  XR femur left 2+ views, XR knee left 4+ views, XR tibia fibula left 2 views  Narrative: Interpreted By:  Pascual Verma and Sheng Max   STUDY:  XR KNEE LEFT 4+ VIEWS; XR TIBIA FIBULA LEFT 2 VIEWS; XR FEMUR LEFT 2+  VIEWS; ;  10/14/2024 5:00 am      INDICATION:  Signs/Symptoms:left femur pain; Signs/Symptoms:left tibia pain.          COMPARISON:  Left femur and knee radiograph 09/27/2024, 07/30/2020      ACCESSION NUMBER(S):  [id number]; [id number]; [id number]      ORDERING CLINICIAN:  ERIC CRYSTAL      FINDINGS:  Left femur, two views:  Left knee, four views:  Left tibia fibula, two views:      Intramedullary nail with screw fixation for left distal femoral  fracture. There is increased callus formation and osseous bridging  across the fracture line suggestive of increased healing. No hardware  fracture or perihardware lucency in the imaged hardware. Moderate  tricompartmental osteoarthrosis of the knee joint. Mild  osteoarthrosis of the left femoroacetabular joint. Soft tissues are  unremarkable.      Impression: 1. ORIF of left distal femoral fracture with findings consistent with  continued healing. No acute fracture.  2. Degenerative changes of the left hip and knee as above.          I personally reviewed the images/study and I agree with the findings  as stated by Dr. Brandt Snow. This  study was interpreted at [place], [place].      MACRO:  None      Signed by: Pascual Verma 10/14/2024 7:19 AM  Dictation workstation:   [id number]      Physical Exam  Constitutional:       General: He is not in acute distress.     Appearance: Normal appearance.   Cardiovascular:      Rate and Rhythm: Normal rate and regular rhythm.      Pulses: Normal pulses.      Heart sounds: Normal heart sounds. No murmur heard.     No friction rub. No gallop.   Pulmonary:      Effort: Pulmonary effort is normal.      Breath sounds: Rhonchi (R sided) present. No wheezing or rales.   Musculoskeletal:      Comments: Small L knee effusion, well healed scar on top   Skin:     General: Skin is warm and dry.      Capillary Refill: Capillary refill takes less than 2 seconds.   Neurological:      General: No focal deficit present.      Mental Status: He is alert and oriented to person, place, and time.   Psychiatric:         Mood and Affect: Mood normal.         Behavior: Behavior normal.       Relevant Results  Scheduled medications  azithromycin, 500 mg, oral, q24h NOMAN  dzviiwwzv-tngrtguw-xxmvolk ala, 1 tablet, oral, Daily  cefTRIAXone, 1 g, intravenous, q24h  enoxaparin, 40 mg, subcutaneous, q24h  gabapentin, 300 mg, oral, q8h NOMAN  OLANZapine, 5 mg, oral, BID  polyethylene glycol, 17 g, oral, Daily  potassium chloride CR, 40 mEq, oral, BID  valproic acid, 500 mg, oral, BID      Continuous medications     PRN medications  PRN medications: acetaminophen, traZODone    Results for orders placed or performed during the hospital encounter of 10/14/24 (from the past 24 hour(s))   Streptococcus pneumoniae Antigen, Urine    Specimen: Clean Catch/Voided; Urine   Result Value Ref Range    Streptococcus pneumoniae Ag, Urine Positive (A) Negative   Legionella Antigen, Urine    Specimen: Clean Catch/Voided; Urine   Result Value Ref Range    L. pneumophila Urine Ag Negative Negative   White Top   Result  Value Ref Range    Extra Tube Hold for add-ons.    Urine Tube   Result Value Ref Range    Extra Tube Hold for add-ons.    Respiratory Culture/Smear    Specimen: SPUTUM; Fluid   Result Value Ref Range    Respiratory Culture/Smear (A)      Culture not performed. See Gram stain findings. Recollect if clinically indicated.    Gram Stain (A)      Gram stain indicates specimen contains significant salivary contamination.   CBC and Auto Differential   Result Value Ref Range    WBC 8.6 4.4 - 11.3 x10*3/uL    nRBC 0.0 0.0 - 0.0 /100 WBCs    RBC 3.56 (L) 4.50 - 5.90 x10*6/uL    Hemoglobin 11.8 (L) 13.5 - 17.5 g/dL    Hematocrit 34.7 (L) 41.0 - 52.0 %    MCV 98 80 - 100 fL    MCH 33.1 26.0 - 34.0 pg    MCHC 34.0 32.0 - 36.0 g/dL    RDW 11.8 11.5 - 14.5 %    Platelets 265 150 - 450 x10*3/uL    Neutrophils % 86.0 40.0 - 80.0 %    Immature Granulocytes %, Automated 0.6 0.0 - 0.9 %    Lymphocytes % 7.0 13.0 - 44.0 %    Monocytes % 6.1 2.0 - 10.0 %    Eosinophils % 0.2 0.0 - 6.0 %    Basophils % 0.1 0.0 - 2.0 %    Neutrophils Absolute 7.36 1.20 - 7.70 x10*3/uL    Immature Granulocytes Absolute, Automated 0.05 0.00 - 0.70 x10*3/uL    Lymphocytes Absolute 0.60 (L) 1.20 - 4.80 x10*3/uL    Monocytes Absolute 0.52 0.10 - 1.00 x10*3/uL    Eosinophils Absolute 0.02 0.00 - 0.70 x10*3/uL    Basophils Absolute 0.01 0.00 - 0.10 x10*3/uL   Magnesium   Result Value Ref Range    Magnesium 2.05 1.60 - 2.40 mg/dL   Renal Function Panel   Result Value Ref Range    Glucose 116 (H) 74 - 99 mg/dL    Sodium 134 (L) 136 - 145 mmol/L    Potassium 3.1 (L) 3.5 - 5.3 mmol/L    Chloride 100 98 - 107 mmol/L    Bicarbonate 23 21 - 32 mmol/L    Anion Gap 14 10 - 20 mmol/L    Urea Nitrogen 26 (H) 6 - 23 mg/dL    Creatinine 1.30 0.50 - 1.30 mg/dL    eGFR 70 >60 mL/min/1.73m*2    Calcium 9.4 8.6 - 10.6 mg/dL    Phosphorus 2.4 (L) 2.5 - 4.9 mg/dL    Albumin 2.9 (L) 3.4 - 5.0 g/dL     CT chest wo IV contrast    Result Date: 10/14/2024  Interpreted By:  Dano Correa,  STUDY: CT CHEST WO IV CONTRAST;  10/14/2024 11:25 am   INDICATION: Signs/Symptoms:evaluate for opportunistic infection RLL PNA HIV positive.   COMPARISON: Prior cross-sectional imaging of chest on PACS for comparison. There are chest radiograph from earlier same day.   ACCESSION NUMBER(S): [id number]   ORDERING CLINICIAN: CACHORRO MA   TECHNIQUE: Helical data acquisition of the chest was obtained without intravenous contrast. Images were reformatted in axial, coronal, and sagittal planes.   FINDINGS: LUNGS AND AIRWAYS: The trachea and central airways are patent. No endobronchial lesion is seen.   There is demonstration of fairly large mixed consolidative and ground-glass opacity within right lower lobe (for example image 242, series 202),, most consistent with underlying infectious process. Remainder of bilateral lungs are clear without additional consolidative opacities, pleural effusion or pneumothorax. There is mild centrilobular and paraseptal emphysematous changes noted bilaterally. There are few solid nodular densities along bilateral interlobar fissures, likely representing benign intrapulmonary lymph nodes.   MEDIASTINUM AND YOEL, LOWER NECK AND AXILLA: The visualized thyroid gland is within normal limits. No evidence of thoracic lymphadenopathy by CT criteria. Few small subcentimeter sized mediastinal lymph nodes are felt to be reactive in nature. Esophagus appears within normal limits as seen.   HEART AND VESSELS: The thoracic aorta normal in course and caliber. Main pulmonary artery and its branches are normal in caliber. No coronary artery calcifications are seen. Please note, the study is not optimized for evaluation of coronary arteries. The cardiac chambers are not enlarged. There is a small pericardial effusion present.   UPPER ABDOMEN: The visualized subdiaphragmatic structures demonstrate no remarkable findings.   CHEST WALL AND OSSEOUS STRUCTURES: Chest wall is within normal limits. No  acute osseous pathology.There are no suspicious osseous lesions.Mild multilevel degenerative changes within visualized spine.       1.  A fairly large mixed consolidative and ground-glass opacity within right lower lobe, most consistent with underlying infectious process. Radiographic follow-up till resolution is recommended. 2. Mild centrilobular and paraseptal emphysema within bilateral lungs. 3. Small pericardial effusion.   Signed by: Dano Correa 10/14/2024 11:31 AM Dictation workstation:   [id number]    XR chest 2 views    Result Date: 10/14/2024  Interpreted By:  Pascual Verma and Sheng Max STUDY: XR CHEST 2 VIEWS;  10/14/2024 5:00 am   INDICATION: Signs/Symptoms:cough, congestion. WBC 11.4 K..     COMPARISON: Chest radiograph dated 07/05/2024   ACCESSION NUMBER(S): [id number]   ORDERING CLINICIAN: ERIC CRYSTAL   FINDINGS: PA and lateral radiograph of the chest:   CARDIOMEDIASTINAL SILHOUETTE: The cardiomediastinal silhouette is normal in size and configuration.   LUNGS: Increased air space opacity in the right lower lobe is concerning for developing pneumonia. No pleural effusion, or pneumothorax.   ABDOMEN: No remarkable upper abdominal findings.   BONES: No acute osseous abnormality.       1. Increased air space opacity in the right lower lobe in the setting of leukocytosis is suggestive of developing pneumonia.     I personally reviewed the images/study and I agree with the findings as stated by Dr. Brandt Snow. This study was interpreted at [place], [place].   MACRO: None   Signed by: Pascual Verma 10/14/2024 7:20 AM Dictation workstation:   [id number]    XR femur left 2+ views    Result Date: 10/14/2024  Interpreted By:  Pascual Verma and Sheng Max STUDY: XR KNEE LEFT 4+ VIEWS; XR TIBIA FIBULA LEFT 2 VIEWS; XR FEMUR LEFT 2+ VIEWS; ;  10/14/2024 5:00 am   INDICATION: Signs/Symptoms:left femur pain; Signs/Symptoms:left tibia pain.     COMPARISON: Left  femur and knee radiograph 09/27/2024, 07/30/2020   ACCESSION NUMBER(S): [id number]; [id number]; [id number]   ORDERING CLINICIAN: ERIC CRYSTAL   FINDINGS: Left femur, two views: Left knee, four views: Left tibia fibula, two views:   Intramedullary nail with screw fixation for left distal femoral fracture. There is increased callus formation and osseous bridging across the fracture line suggestive of increased healing. No hardware fracture or perihardware lucency in the imaged hardware. Moderate tricompartmental osteoarthrosis of the knee joint. Mild osteoarthrosis of the left femoroacetabular joint. Soft tissues are unremarkable.       1. ORIF of left distal femoral fracture with findings consistent with continued healing. No acute fracture. 2. Degenerative changes of the left hip and knee as above.     I personally reviewed the images/study and I agree with the findings as stated by Dr. Brandt Snow. This study was interpreted at [place], [place].   MACRO: None   Signed by: Pascual Verma 10/14/2024 7:19 AM Dictation workstation:   [id number]    XR knee left 4+ views    Result Date: 10/14/2024  Interpreted By:  Pascual Verma and Sheng Max STUDY: XR KNEE LEFT 4+ VIEWS; XR TIBIA FIBULA LEFT 2 VIEWS; XR FEMUR LEFT 2+ VIEWS; ;  10/14/2024 5:00 am   INDICATION: Signs/Symptoms:left femur pain; Signs/Symptoms:left tibia pain.     COMPARISON: Left femur and knee radiograph 09/27/2024, 07/30/2020   ACCESSION NUMBER(S): [id number]; [id number]; [id number]   ORDERING CLINICIAN: ERIC CRYSTAL   FINDINGS: Left femur, two views: Left knee, four views: Left tibia fibula, two views:   Intramedullary nail with screw fixation for left distal femoral fracture. There is increased callus formation and osseous bridging across the fracture line suggestive of increased healing. No hardware fracture or perihardware lucency in the imaged hardware. Moderate tricompartmental  osteoarthrosis of the knee joint. Mild osteoarthrosis of the left femoroacetabular joint. Soft tissues are unremarkable.       1. ORIF of left distal femoral fracture with findings consistent with continued healing. No acute fracture. 2. Degenerative changes of the left hip and knee as above.     I personally reviewed the images/study and I agree with the findings as stated by Dr. Brandt Snow. This study was interpreted at [place], [place].   MACRO: None   Signed by: Pascual Verma 10/14/2024 7:19 AM Dictation workstation:   [id number]    XR tibia fibula left 2 views    Result Date: 10/14/2024  Interpreted By:  Pascual Verma and Sheng Max STUDY: XR KNEE LEFT 4+ VIEWS; XR TIBIA FIBULA LEFT 2 VIEWS; XR FEMUR LEFT 2+ VIEWS; ;  10/14/2024 5:00 am   INDICATION: Signs/Symptoms:left femur pain; Signs/Symptoms:left tibia pain.     COMPARISON: Left femur and knee radiograph 09/27/2024, 07/30/2020   ACCESSION NUMBER(S): [id number]; [id number]; [id number]   ORDERING CLINICIAN: ERIC CRYSTAL   FINDINGS: Left femur, two views: Left knee, four views: Left tibia fibula, two views:   Intramedullary nail with screw fixation for left distal femoral fracture. There is increased callus formation and osseous bridging across the fracture line suggestive of increased healing. No hardware fracture or perihardware lucency in the imaged hardware. Moderate tricompartmental osteoarthrosis of the knee joint. Mild osteoarthrosis of the left femoroacetabular joint. Soft tissues are unremarkable.       1. ORIF of left distal femoral fracture with findings consistent with continued healing. No acute fracture. 2. Degenerative changes of the left hip and knee as above.     I personally reviewed the images/study and I agree with the findings as stated by Dr. Brandt Snow. This study was interpreted at [place], [place].   MACRO: None   Signed by: Pascual Verma  10/14/2024 7:19 AM Dictation workstation:   [id number]       Assessment/Plan      Assessment & Plan  Pneumonia due to infectious organism, unspecified laterality, unspecified part of lung    Bacterial pneumonia with HIV infection (Multi)    Bipolar 1 disorder (Multi)    PTSD (post-traumatic stress disorder)    Medication nonadherence due to psychosocial problem    Polysubstance abuse (Multi)    Hypokalemia    Diarrhea of presumed infectious origin    EDA (acute kidney injury) (CMS-[facility])    Romaine Rodgers is a 44 y.o. male with  PMH HIV (not on ART), bipolar, PTSD, polysubstance use disorder, recent L femoral intra-articular fx (s/p rIMN/ORIF 9/27) here with pneumonia.     Patient reports improvement in symptoms today. Strep pneumonia urine antigen positive, legionella urine antigen negative, continuing CAP treatment. Chronic diarrhea concerning for potential infectious etiology given immunosuppression, nothing yet on infectious GI workup. HIV RNA positive, viral load 19k. Patient open to taking dose of Biktarvy tomorrow, will continue encouraging compliance with ART regimen.    #HIV/AIDS  #RLL pneumonia  #diarrhea  :: CXR and CT chest w/o contrast showing RLL consolidation.   :: Flu/COVID/RSV, Hep C negative  -pending studies: , CD4 count, HIV quant, stool pathogen panel, cryptosporidium, stool O&P, mycoplasma, giardia, sputum culture  -positive strep pneumo urine antigen  -continue CTX 1g q24h, azithro 500 mg PO daily (day 2/3)  -Biktarvy daily      #L intraarticular femoral fx s/p IMN/ORIF  :: well-healed scar on exam  -small warm effusion noted on L knee, though imaging reassuring against septic arthritis  -tylenol q6h prn  -gabapentin 300 q8h     #PTSD  #polysubstance use disorder (EtOH, cannabis, tobacco)  #Hx multiple TBIs  #Hx agitation  :: based on psych recs from prior admit with multiple concerns for delirium, agitation  -depakote 500 mg BID  -zyprexa 5 mg BID     #protein gap  :: 8.9, noted on  admission CMP  :: may be attributable to underlying HIV infection  -SPEP/UPEP pending     F: Replete prn, preferably PO  E: Replete prn  N: Regular diet  A: PIV x1    DVT ppx: Lovenox  GI ppx: PPI  Pain regimen: tylenol, gabapentin  Bowel regimen: none  Abx: none  O2: RA  Code Status: FULL CODE  Emergency contact:     Sergio Rangel MD  PGY-1, Internal Medicine-Pediatrics

## 2024-10-15 NOTE — CARE PLAN
Problem: Pain - Adult  Goal: Verbalizes/displays adequate comfort level or baseline comfort level  Outcome: Progressing     Problem: Safety - Adult  Goal: Free from fall injury  Outcome: Progressing     Problem: Discharge Planning  Goal: Discharge to home or other facility with appropriate resources  Outcome: Progressing     Problem: Chronic Conditions and Co-morbidities  Goal: Patient's chronic conditions and co-morbidity symptoms are monitored and maintained or improved  Outcome: Progressing   The patient's goals for the shift include      The clinical goals for the shift include Keep Paint stable.

## 2024-10-15 NOTE — DISCHARGE INSTRUCTIONS
Dear ***,    You were admitted to [facility] from *** to [facility] for ***. ***tx.     While you were admitted, *** [imaging, labs relevant follow up with specialists].      Finally, *** [any changes we made to meds].     While you were in the hospital, *** [any other changes of note]    Medication changes for when you go home:  Start taking:  Do not take until you talk to your family doctor:  Stop taking:     Appointments/follow-up:  *** Follow-Up Visit -- on *** @ *** with *** at ***   *** Clinic Visit -- on *** @ *** with *** at ***     It was a pleasure taking care of you,  Your  Care Team

## 2024-10-15 NOTE — NURSING NOTE
10/14/24  2010:Pt admitted from ED to [facility] room 306, Pt observed with Left leg  weakness and pain. Pt is  A/Ox4 and on room air, Pt assisted to in getting situated to his room , admission package provided, safety maintained, call light and personal items at reach.

## 2024-10-16 ENCOUNTER — APPOINTMENT (OUTPATIENT)
Dept: RADIOLOGY | Facility: HOSPITAL | Age: 44
DRG: 856 | End: 2024-10-16
Payer: COMMERCIAL

## 2024-10-16 ENCOUNTER — APPOINTMENT (OUTPATIENT)
Dept: CARDIOLOGY | Facility: HOSPITAL | Age: 44
DRG: 856 | End: 2024-10-16
Payer: COMMERCIAL

## 2024-10-16 PROBLEM — M00.9 PYOGENIC ARTHRITIS OF LEFT KNEE JOINT (MULTI): Status: ACTIVE | Noted: 2024-10-14

## 2024-10-16 PROBLEM — T81.40XS INFECTION FOLLOWING A PROCEDURE, UNSPECIFIED, SEQUELA: Status: ACTIVE | Noted: 2024-10-14

## 2024-10-16 LAB
ABO GROUP (TYPE) IN BLOOD: NORMAL
ABO GROUP (TYPE) IN BLOOD: NORMAL
ALBUMIN SERPL BCP-MCNC: 2.7 G/DL (ref 3.4–5)
ALBUMIN: 2.8 G/DL (ref 3.4–5)
ALPHA 1 GLOBULIN: 0.6 G/DL (ref 0.2–0.6)
ALPHA 2 GLOBULIN: 1.6 G/DL (ref 0.4–1.1)
ANION GAP SERPL CALC-SCNC: 12 MMOL/L (ref 10–20)
ANTIBODY SCREEN: NORMAL
APTT PPP: 32 SECONDS (ref 27–38)
BASOPHILS # BLD AUTO: 0.01 X10*3/UL (ref 0–0.1)
BASOPHILS NFR BLD AUTO: 0.1 %
BASOPHILS NFR FLD MANUAL: 0 %
BETA GLOBULIN: 0.9 G/DL (ref 0.5–1.2)
BLASTS NFR FLD MANUAL: 0 %
BUN SERPL-MCNC: 14 MG/DL (ref 6–23)
CALCIUM SERPL-MCNC: 9 MG/DL (ref 8.6–10.6)
CHLORIDE SERPL-SCNC: 100 MMOL/L (ref 98–107)
CLARITY FLD: ABNORMAL
CO2 SERPL-SCNC: 23 MMOL/L (ref 21–32)
COLOR FLD: YELLOW
CREAT SERPL-MCNC: 1.04 MG/DL (ref 0.5–1.3)
CRP SERPL-MCNC: 23.09 MG/DL
CRYSTALS FLD MICRO: NORMAL
EGFRCR SERPLBLD CKD-EPI 2021: >90 ML/MIN/1.73M*2
EOSINOPHIL # BLD AUTO: 0.01 X10*3/UL (ref 0–0.7)
EOSINOPHIL NFR BLD AUTO: 0.1 %
EOSINOPHIL NFR FLD MANUAL: 0 %
ERYTHROCYTE [DISTWIDTH] IN BLOOD BY AUTOMATED COUNT: 12.3 % (ref 11.5–14.5)
ERYTHROCYTE [SEDIMENTATION RATE] IN BLOOD BY WESTERGREN METHOD: >130 MM/H (ref 0–15)
GAMMA GLOBULIN: 3 G/DL (ref 0.5–1.4)
GLUCOSE SERPL-MCNC: 113 MG/DL (ref 74–99)
HCT VFR BLD AUTO: 32.1 % (ref 41–52)
HGB BLD-MCNC: 10.9 G/DL (ref 13.5–17.5)
IMM GRANULOCYTES # BLD AUTO: 0.07 X10*3/UL (ref 0–0.7)
IMM GRANULOCYTES NFR BLD AUTO: 1 % (ref 0–0.9)
IMMATURE GRANULOCYTES IN FLUID: 0 %
IMMUNOFIXATION COMMENT: ABNORMAL
INR PPP: 1.3 (ref 0.9–1.1)
LYMPHOCYTES # BLD AUTO: 0.74 X10*3/UL (ref 1.2–4.8)
LYMPHOCYTES NFR BLD AUTO: 10.7 %
LYMPHOCYTES NFR FLD MANUAL: 2 %
M-PROTEIN 1: 1.1 G/DL
MAGNESIUM SERPL-MCNC: 1.72 MG/DL (ref 1.6–2.4)
MCH RBC QN AUTO: 33.4 PG (ref 26–34)
MCHC RBC AUTO-ENTMCNC: 34 G/DL (ref 32–36)
MCV RBC AUTO: 99 FL (ref 80–100)
MONOCYTES # BLD AUTO: 0.63 X10*3/UL (ref 0.1–1)
MONOCYTES NFR BLD AUTO: 9.1 %
MONOS+MACROS NFR FLD MANUAL: 6 %
NEUTROPHILS # BLD AUTO: 5.46 X10*3/UL (ref 1.2–7.7)
NEUTROPHILS NFR BLD AUTO: 79 %
NEUTROPHILS NFR FLD MANUAL: 92 %
NRBC BLD-RTO: 0 /100 WBCS (ref 0–0)
OTHER CELLS NFR FLD MANUAL: 0 %
PATH REVIEW - SERUM IMMUNOFIXATION: ABNORMAL
PATH REVIEW-SERUM PROTEIN ELECTROPHORESIS: ABNORMAL
PHOSPHATE SERPL-MCNC: 2.6 MG/DL (ref 2.5–4.9)
PLASMA CELLS NFR FLD MANUAL: 0 %
PLATELET # BLD AUTO: 248 X10*3/UL (ref 150–450)
POTASSIUM SERPL-SCNC: 3.3 MMOL/L (ref 3.5–5.3)
PROTEIN ELECTROPHORESIS COMMENT: ABNORMAL
PROTHROMBIN TIME: 14.8 SECONDS (ref 9.8–12.8)
RBC # BLD AUTO: 3.26 X10*6/UL (ref 4.5–5.9)
RBC # FLD AUTO: ABNORMAL /UL
RH FACTOR (ANTIGEN D): NORMAL
RH FACTOR (ANTIGEN D): NORMAL
SODIUM SERPL-SCNC: 132 MMOL/L (ref 136–145)
STAPHYLOCOCCUS SPEC CULT: NORMAL
TOTAL CELLS COUNTED FLD: 100
WBC # BLD AUTO: 6.9 X10*3/UL (ref 4.4–11.3)
WBC # FLD AUTO: ABNORMAL /UL

## 2024-10-16 PROCEDURE — 2500000004 HC RX 250 GENERAL PHARMACY W/ HCPCS (ALT 636 FOR OP/ED)

## 2024-10-16 PROCEDURE — 1100000001 HC PRIVATE ROOM DAILY

## 2024-10-16 PROCEDURE — 86901 BLOOD TYPING SEROLOGIC RH(D): CPT

## 2024-10-16 PROCEDURE — 2500000002 HC RX 250 W HCPCS SELF ADMINISTERED DRUGS (ALT 637 FOR MEDICARE OP, ALT 636 FOR OP/ED)

## 2024-10-16 PROCEDURE — 2500000001 HC RX 250 WO HCPCS SELF ADMINISTERED DRUGS (ALT 637 FOR MEDICARE OP): Performed by: STUDENT IN AN ORGANIZED HEALTH CARE EDUCATION/TRAINING PROGRAM

## 2024-10-16 PROCEDURE — 89051 BODY FLUID CELL COUNT: CPT

## 2024-10-16 PROCEDURE — 89060 EXAM SYNOVIAL FLUID CRYSTALS: CPT

## 2024-10-16 PROCEDURE — 73700 CT LOWER EXTREMITY W/O DYE: CPT | Mod: LT

## 2024-10-16 PROCEDURE — 93975 VASCULAR STUDY: CPT

## 2024-10-16 PROCEDURE — 85652 RBC SED RATE AUTOMATED: CPT

## 2024-10-16 PROCEDURE — 2500000001 HC RX 250 WO HCPCS SELF ADMINISTERED DRUGS (ALT 637 FOR MEDICARE OP)

## 2024-10-16 PROCEDURE — 87102 FUNGUS ISOLATION CULTURE: CPT

## 2024-10-16 PROCEDURE — 87040 BLOOD CULTURE FOR BACTERIA: CPT

## 2024-10-16 PROCEDURE — 80069 RENAL FUNCTION PANEL: CPT

## 2024-10-16 PROCEDURE — 36415 COLL VENOUS BLD VENIPUNCTURE: CPT

## 2024-10-16 PROCEDURE — 73564 X-RAY EXAM KNEE 4 OR MORE: CPT | Mod: LT

## 2024-10-16 PROCEDURE — 93005 ELECTROCARDIOGRAM TRACING: CPT

## 2024-10-16 PROCEDURE — 87070 CULTURE OTHR SPECIMN AEROBIC: CPT

## 2024-10-16 PROCEDURE — 76870 US EXAM SCROTUM: CPT | Performed by: RADIOLOGY

## 2024-10-16 PROCEDURE — 83735 ASSAY OF MAGNESIUM: CPT

## 2024-10-16 PROCEDURE — 85025 COMPLETE CBC W/AUTO DIFF WBC: CPT

## 2024-10-16 PROCEDURE — 99233 SBSQ HOSP IP/OBS HIGH 50: CPT

## 2024-10-16 PROCEDURE — 85610 PROTHROMBIN TIME: CPT

## 2024-10-16 PROCEDURE — 99223 1ST HOSP IP/OBS HIGH 75: CPT | Performed by: STUDENT IN AN ORGANIZED HEALTH CARE EDUCATION/TRAINING PROGRAM

## 2024-10-16 PROCEDURE — 73700 CT LOWER EXTREMITY W/O DYE: CPT | Mod: LEFT SIDE | Performed by: RADIOLOGY

## 2024-10-16 PROCEDURE — 73564 X-RAY EXAM KNEE 4 OR MORE: CPT | Mod: LEFT SIDE | Performed by: RADIOLOGY

## 2024-10-16 RX ORDER — MAGNESIUM SULFATE HEPTAHYDRATE 40 MG/ML
4 INJECTION, SOLUTION INTRAVENOUS ONCE
Status: COMPLETED | OUTPATIENT
Start: 2024-10-16 | End: 2024-10-17

## 2024-10-16 RX ORDER — VANCOMYCIN HYDROCHLORIDE 750 MG/150ML
750 INJECTION, SOLUTION INTRAVENOUS EVERY 12 HOURS
Status: DISCONTINUED | OUTPATIENT
Start: 2024-10-16 | End: 2024-10-18

## 2024-10-16 RX ORDER — VANCOMYCIN HYDROCHLORIDE 1 G/20ML
INJECTION, POWDER, LYOPHILIZED, FOR SOLUTION INTRAVENOUS DAILY PRN
Status: DISCONTINUED | OUTPATIENT
Start: 2024-10-16 | End: 2024-10-20 | Stop reason: HOSPADM

## 2024-10-16 RX ORDER — POTASSIUM CHLORIDE 20 MEQ/1
40 TABLET, EXTENDED RELEASE ORAL 2 TIMES DAILY
Status: DISPENSED | OUTPATIENT
Start: 2024-10-16 | End: 2024-10-17

## 2024-10-16 ASSESSMENT — COGNITIVE AND FUNCTIONAL STATUS - GENERAL
WALKING IN HOSPITAL ROOM: A LITTLE
CLIMB 3 TO 5 STEPS WITH RAILING: A LITTLE
WALKING IN HOSPITAL ROOM: A LITTLE
MOBILITY SCORE: 22
DAILY ACTIVITIY SCORE: 24
WALKING IN HOSPITAL ROOM: A LITTLE
MOBILITY SCORE: 22
CLIMB 3 TO 5 STEPS WITH RAILING: A LITTLE
CLIMB 3 TO 5 STEPS WITH RAILING: A LITTLE
DAILY ACTIVITIY SCORE: 24
MOBILITY SCORE: 22

## 2024-10-16 ASSESSMENT — PAIN - FUNCTIONAL ASSESSMENT
PAIN_FUNCTIONAL_ASSESSMENT: 0-10
PAIN_FUNCTIONAL_ASSESSMENT: 0-10

## 2024-10-16 ASSESSMENT — PAIN SCALES - GENERAL
PAINLEVEL_OUTOF10: 0 - NO PAIN

## 2024-10-16 NOTE — CARE PLAN
Problem: Pain - Adult  Goal: Verbalizes/displays adequate comfort level or baseline comfort level  Outcome: Progressing     Problem: Safety - Adult  Goal: Free from fall injury  Outcome: Progressing     Problem: Discharge Planning  Goal: Discharge to home or other facility with appropriate resources  Outcome: Progressing     Problem: Chronic Conditions and Co-morbidities  Goal: Patient's chronic conditions and co-morbidity symptoms are monitored and maintained or improved  Outcome: Progressing     Problem: Skin  Goal: Decreased wound size/increased tissue granulation at next dressing change  Outcome: Progressing  Goal: Participates in plan/prevention/treatment measures  Outcome: Progressing  Goal: Prevent/manage excess moisture  Outcome: Progressing  Goal: Prevent/minimize sheer/friction injuries  Outcome: Progressing  Goal: Promote/optimize nutrition  Outcome: Progressing  Goal: Promote skin healing  Outcome: Progressing   The patient's goals for the shift include      The clinical goals for the shift include pt will remain stable
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
## 2024-10-16 NOTE — CARE PLAN
The patient's goals for the shift include      The clinical goals for the shift include pt will remain stable    Over the shift, the patient did not make progress toward the following goals. Barriers to progression include ***. Recommendations to address these barriers include ***.

## 2024-10-16 NOTE — PROGRESS NOTES
Vancomycin Dosing by Pharmacy- INITIAL    Romaine Rodgers is a 44 y.o. year old male who Pharmacy has been consulted for vancomycin dosing for osteomyelitis/septic arthritis   Pneumonia, septic arthritis, draining scrotal abscess   . Based on the patient's indication and renal status this patient will be dosed based on a goal AUC of 400-600.     Renal function is currently stable.    Visit Vitals  /86   Pulse (!) 128   Temp 36 °C (96.8 °F)   Resp 16        Lab Results   Component Value Date    CREATININE 1.30 10/15/2024    CREATININE 1.31 (H) 10/14/2024    CREATININE 1.03 2024    CREATININE 0.80 2024        Patient weight is as follows:   Vitals:    10/14/24 0300   Weight: 69.9 kg (154 lb)       Cultures:  No results found for the encounter in last 14 days.        I/O last 3 completed shifts:  In: 50 (0.7 mL/kg) [IV Piggyback:50]  Out: - (0 mL/kg)   Weight: 69.9 kg   I/O during current shift:  No intake/output data recorded.    Temp (24hrs), Av.9 °C (98.5 °F), Min:36 °C (96.8 °F), Max:37.4 °C (99.3 °F)         Assessment/Plan     Patient will not be given a loading dose.  Will initiate vancomycin maintenance,  750 mg every 12 hours.    This dosing regimen is predicted by [employer] to result in the following pharmacokinetic parameters:  Loading dose: N/A  Regimen: 750 mg IV every 12 hours.  Start time: 09:57 on 10/16/2024  Exposure target: AUC24 (range)400-600 mg/L.hr   BMA03-09: 358 mg/L.hr  AUC24,ss: 469 mg/L.hr  Probability of AUC24 > 400: 68 %  Ctrough,ss: 15.3 mg/L  Probability of Ctrough,ss > 20: 24 %      Follow-up level will be ordered on 10/18 at 0500 unless clinically indicated sooner.  Will continue to monitor renal function daily while on vancomycin and order serum creatinine at least every 48 hours if not already ordered.  Follow for continued vancomycin needs, clinical response, and signs/symptoms of toxicity.       Kristin Lao, PharmD   Roselyn Lincoln 3 Pharmacist
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
## 2024-10-16 NOTE — SIGNIFICANT EVENT
Preoperative Risk Assessment:  Patient requires surgery for: I&D L knee     Patient was evaluated at the bedside.  Patient has prior history of HIV, bipolar disorder, PTSD  Most recent Echo:   None on file    RCRI score of 0 which has a 30-day risk of death, MI, or cardiac arrest of 3.9%.   METS: >4      Pt does not require further coronary evaluation before surgery.     Pt currently optimized medically to proceed with planned surgery.    Would recommend bronchopulmonary hygiene, early use of Incentive spirometer and PRN Duoneb post-op.      *Risk assessment and recommendations not final until signed by attending*

## 2024-10-16 NOTE — PROGRESS NOTES
Romaine Rodgers is a 44 y.o. male on day 2 of admission presenting with Bacterial pneumonia with HIV infection (Multi).    Subjective   -NAEO  -patient alert this morning, reports improvement in chest pain, dyspnea  -patient endorses worsening pain of L knee, very warm to the touch on exam, ortho and ID consulted for septic arthritis  -patient also reports boil near scrotum that is draining purulent fluid  -when reassured that medication would be delivered to bedside prior to dc, patient reports interest in taking daily Biktarvy        Objective     Last Recorded Vitals  /86   Pulse (!) 128   Temp 36 °C (96.8 °F)   Resp 16   Wt 69.9 kg (154 lb)   SpO2 99%   Intake/Output last 3 Shifts:    Intake/Output Summary (Last 24 hours) at 10/16/2024 0640  Last data filed at 10/15/2024 1100  Gross per 24 hour   Intake 50 ml   Output --   Net 50 ml       Admission Weight  Weight: 69.9 kg (154 lb) (10/14/24 0300)    Daily Weight  10/14/24 : 69.9 kg (154 lb)    Image Results  CT chest wo IV contrast  Narrative: Interpreted By:  Dano Correa,   STUDY:  CT CHEST WO IV CONTRAST;  10/14/2024 11:25 am      INDICATION:  Signs/Symptoms:evaluate for opportunistic infection RLL PNA HIV  positive.      COMPARISON:  Prior cross-sectional imaging of chest on PACS for comparison. There  are chest radiograph from earlier same day.      ACCESSION NUMBER(S):  [id number]      ORDERING CLINICIAN:  CACHORRO MA      TECHNIQUE:  Helical data acquisition of the chest was obtained without  intravenous contrast. Images were reformatted in axial, coronal, and  sagittal planes.      FINDINGS:  LUNGS AND AIRWAYS:  The trachea and central airways are patent. No endobronchial lesion  is seen.      There is demonstration of fairly large mixed consolidative and  ground-glass opacity within right lower lobe (for example image 242,  series 202),, most consistent with underlying infectious process.  Remainder of bilateral lungs are clear without  additional  consolidative opacities, pleural effusion or pneumothorax. There is  mild centrilobular and paraseptal emphysematous changes noted  bilaterally. There are few solid nodular densities along bilateral  interlobar fissures, likely representing benign intrapulmonary lymph  nodes.      MEDIASTINUM AND YOEL, LOWER NECK AND AXILLA:  The visualized thyroid gland is within normal limits.  No evidence of thoracic lymphadenopathy by CT criteria. Few small  subcentimeter sized mediastinal lymph nodes are felt to be reactive  in nature. Esophagus appears within normal limits as seen.      HEART AND VESSELS:  The thoracic aorta normal in course and caliber.  Main pulmonary artery and its branches are normal in caliber.  No coronary artery calcifications are seen. Please note, the study is  not optimized for evaluation of coronary arteries. The cardiac  chambers are not enlarged. There is a small pericardial effusion  present.      UPPER ABDOMEN:  The visualized subdiaphragmatic structures demonstrate no remarkable  findings.      CHEST WALL AND OSSEOUS STRUCTURES:  Chest wall is within normal limits.  No acute osseous pathology.There are no suspicious osseous  lesions.Mild multilevel degenerative changes within visualized spine.      Impression: 1.  A fairly large mixed consolidative and ground-glass opacity  within right lower lobe, most consistent with underlying infectious  process. Radiographic follow-up till resolution is recommended.  2. Mild centrilobular and paraseptal emphysema within bilateral lungs.  3. Small pericardial effusion.      Signed by: Dano Correa 10/14/2024 11:31 AM  Dictation workstation:   [id number]  XR chest 2 views  Narrative: Interpreted By:  Pascual Verma and Sheng Max   STUDY:  XR CHEST 2 VIEWS;  10/14/2024 5:00 am      INDICATION:  Signs/Symptoms:cough, congestion. WBC 11.4 K..          COMPARISON:  Chest radiograph dated 07/05/2024      ACCESSION NUMBER(S):  [id number]       ORDERING CLINICIAN:  ERIC CRYSTAL      FINDINGS:  PA and lateral radiograph of the chest:      CARDIOMEDIASTINAL SILHOUETTE:  The cardiomediastinal silhouette is normal in size and configuration.      LUNGS:  Increased air space opacity in the right lower lobe is concerning for  developing pneumonia. No pleural effusion, or pneumothorax.      ABDOMEN:  No remarkable upper abdominal findings.      BONES:  No acute osseous abnormality.      Impression: 1. Increased air space opacity in the right lower lobe in the setting  of leukocytosis is suggestive of developing pneumonia.          I personally reviewed the images/study and I agree with the findings  as stated by Dr. Brandt Snow. This study was interpreted at [place], [place].      MACRO:  None      Signed by: Pascual Verma 10/14/2024 7:20 AM  Dictation workstation:   [id number]  XR femur left 2+ views, XR knee left 4+ views, XR tibia fibula left 2 views  Narrative: Interpreted By:  Pascual Verma and Sheng Max   STUDY:  XR KNEE LEFT 4+ VIEWS; XR TIBIA FIBULA LEFT 2 VIEWS; XR FEMUR LEFT 2+  VIEWS; ;  10/14/2024 5:00 am      INDICATION:  Signs/Symptoms:left femur pain; Signs/Symptoms:left tibia pain.          COMPARISON:  Left femur and knee radiograph 09/27/2024, 07/30/2020      ACCESSION NUMBER(S):  [id number]; [id number]; [id number]      ORDERING CLINICIAN:  ERIC CRYSTAL      FINDINGS:  Left femur, two views:  Left knee, four views:  Left tibia fibula, two views:      Intramedullary nail with screw fixation for left distal femoral  fracture. There is increased callus formation and osseous bridging  across the fracture line suggestive of increased healing. No hardware  fracture or perihardware lucency in the imaged hardware. Moderate  tricompartmental osteoarthrosis of the knee joint. Mild  osteoarthrosis of the left femoroacetabular joint. Soft tissues are  unremarkable.      Impression: 1. ORIF of left  distal femoral fracture with findings consistent with  continued healing. No acute fracture.  2. Degenerative changes of the left hip and knee as above.          I personally reviewed the images/study and I agree with the findings  as stated by Dr. Brandt Snow. This study was interpreted at [facility], [place], [place].      MACRO:  None      Signed by: Pascual eVrma 10/14/2024 7:19 AM  Dictation workstation:   [id number]      Physical Exam  Constitutional:       General: He is not in acute distress.     Appearance: Normal appearance.   Cardiovascular:      Rate and Rhythm: Normal rate and regular rhythm.      Pulses: Normal pulses.      Heart sounds: Normal heart sounds. No murmur heard.     No friction rub. No gallop.   Pulmonary:      Effort: Pulmonary effort is normal.      Breath sounds: Rhonchi (R sided) present. No wheezing or rales.   Musculoskeletal:         General: Swelling (L knee, warm to the touch) and tenderness (L knee) present.      Comments: Small L knee effusion, well healed scar on top   Skin:     General: Skin is warm and dry.      Capillary Refill: Capillary refill takes less than 2 seconds.      Findings: Lesion (scrotal boil, draining purulent fluid) present.   Neurological:      General: No focal deficit present.      Mental Status: He is alert and oriented to person, place, and time.   Psychiatric:         Mood and Affect: Mood normal.         Behavior: Behavior normal.       Relevant Results  Scheduled medications  azithromycin, 500 mg, oral, q24h NOMAN  kwrpkuovi-bmhtdrmi-mojdqsi ala, 1 tablet, oral, Daily  cefTRIAXone, 1 g, intravenous, q24h  enoxaparin, 40 mg, subcutaneous, q24h  gabapentin, 300 mg, oral, q8h NOMAN  OLANZapine, 5 mg, oral, BID  polyethylene glycol, 17 g, oral, Daily  valproic acid, 500 mg, oral, BID      Continuous medications     PRN medications  PRN medications: acetaminophen, traZODone    Results for orders placed or performed during the  hospital encounter of 10/14/24 (from the past 24 hours)   CBC and Auto Differential   Result Value Ref Range    WBC 8.6 4.4 - 11.3 x10*3/uL    nRBC 0.0 0.0 - 0.0 /100 WBCs    RBC 3.56 (L) 4.50 - 5.90 x10*6/uL    Hemoglobin 11.8 (L) 13.5 - 17.5 g/dL    Hematocrit 34.7 (L) 41.0 - 52.0 %    MCV 98 80 - 100 fL    MCH 33.1 26.0 - 34.0 pg    MCHC 34.0 32.0 - 36.0 g/dL    RDW 11.8 11.5 - 14.5 %    Platelets 265 150 - 450 x10*3/uL    Neutrophils % 86.0 40.0 - 80.0 %    Immature Granulocytes %, Automated 0.6 0.0 - 0.9 %    Lymphocytes % 7.0 13.0 - 44.0 %    Monocytes % 6.1 2.0 - 10.0 %    Eosinophils % 0.2 0.0 - 6.0 %    Basophils % 0.1 0.0 - 2.0 %    Neutrophils Absolute 7.36 1.20 - 7.70 x10*3/uL    Immature Granulocytes Absolute, Automated 0.05 0.00 - 0.70 x10*3/uL    Lymphocytes Absolute 0.60 (L) 1.20 - 4.80 x10*3/uL    Monocytes Absolute 0.52 0.10 - 1.00 x10*3/uL    Eosinophils Absolute 0.02 0.00 - 0.70 x10*3/uL    Basophils Absolute 0.01 0.00 - 0.10 x10*3/uL   Magnesium   Result Value Ref Range    Magnesium 2.05 1.60 - 2.40 mg/dL   Renal Function Panel   Result Value Ref Range    Glucose 116 (H) 74 - 99 mg/dL    Sodium 134 (L) 136 - 145 mmol/L    Potassium 3.1 (L) 3.5 - 5.3 mmol/L    Chloride 100 98 - 107 mmol/L    Bicarbonate 23 21 - 32 mmol/L    Anion Gap 14 10 - 20 mmol/L    Urea Nitrogen 26 (H) 6 - 23 mg/dL    Creatinine 1.30 0.50 - 1.30 mg/dL    eGFR 70 >60 mL/min/1.73m*2    Calcium 9.4 8.6 - 10.6 mg/dL    Phosphorus 2.4 (L) 2.5 - 4.9 mg/dL    Albumin 2.9 (L) 3.4 - 5.0 g/dL     CT chest wo IV contrast    Result Date: 10/14/2024  Interpreted By:  Dnao Correa, STUDY: CT CHEST WO IV CONTRAST;  10/14/2024 11:25 am   INDICATION: Signs/Symptoms:evaluate for opportunistic infection RLL PNA HIV positive.   COMPARISON: Prior cross-sectional imaging of chest on PACS for comparison. There are chest radiograph from earlier same day.   ACCESSION NUMBER(S): [id number]   ORDERING CLINICIAN: CACHORRO MA   TECHNIQUE: Helical  data acquisition of the chest was obtained without intravenous contrast. Images were reformatted in axial, coronal, and sagittal planes.   FINDINGS: LUNGS AND AIRWAYS: The trachea and central airways are patent. No endobronchial lesion is seen.   There is demonstration of fairly large mixed consolidative and ground-glass opacity within right lower lobe (for example image 242, series 202),, most consistent with underlying infectious process. Remainder of bilateral lungs are clear without additional consolidative opacities, pleural effusion or pneumothorax. There is mild centrilobular and paraseptal emphysematous changes noted bilaterally. There are few solid nodular densities along bilateral interlobar fissures, likely representing benign intrapulmonary lymph nodes.   MEDIASTINUM AND YOEL, LOWER NECK AND AXILLA: The visualized thyroid gland is within normal limits. No evidence of thoracic lymphadenopathy by CT criteria. Few small subcentimeter sized mediastinal lymph nodes are felt to be reactive in nature. Esophagus appears within normal limits as seen.   HEART AND VESSELS: The thoracic aorta normal in course and caliber. Main pulmonary artery and its branches are normal in caliber. No coronary artery calcifications are seen. Please note, the study is not optimized for evaluation of coronary arteries. The cardiac chambers are not enlarged. There is a small pericardial effusion present.   UPPER ABDOMEN: The visualized subdiaphragmatic structures demonstrate no remarkable findings.   CHEST WALL AND OSSEOUS STRUCTURES: Chest wall is within normal limits. No acute osseous pathology.There are no suspicious osseous lesions.Mild multilevel degenerative changes within visualized spine.       1.  A fairly large mixed consolidative and ground-glass opacity within right lower lobe, most consistent with underlying infectious process. Radiographic follow-up till resolution is recommended. 2. Mild centrilobular and paraseptal  emphysema within bilateral lungs. 3. Small pericardial effusion.   Signed by: Dano Correa 10/14/2024 11:31 AM Dictation workstation:   [id number]       Assessment/Plan      Assessment & Plan  Pneumonia due to infectious organism, unspecified laterality, unspecified part of lung    Bacterial pneumonia with HIV infection (Multi)    Bipolar 1 disorder (Multi)    PTSD (post-traumatic stress disorder)    Medication nonadherence due to psychosocial problem    Polysubstance abuse (Multi)    Hypokalemia    Diarrhea of presumed infectious origin    EDA (acute kidney injury) (CMS-[facility])    Romaine Rodgers is a 44 y.o. male with  PMH HIV (not on ART), bipolar, PTSD, polysubstance use disorder, recent L femoral intra-articular fx (s/p rIMN/ORIF 9/27) here with pneumonia.     Exam this morning concerning for septic athritis. Ortho consulted, stat xrays ordered. Knee aspirate cloudy, WBC count from aspirate 83k. Will obtain L leg CT to assist ortho in potential OR planning. CRP came back at 23. Broadening to vanc/zosyn, ordered blood cultures. Patient afebrile, tachycardic, EKG pending.    CD4 count resulted at 172, patient does meet criteria for AIDS. Will be imperative to take ART on dc, ongoing discussions with patient around importance of adherence to Biktarvy. No changes to prophylaxis at this time, appreciate ID recs.     #HIV/AIDS  #RLL pneumonia  #diarrhea  :: CXR and CT chest w/o contrast showing RLL consolidation.   :: Flu/COVID/RSV, Hep C negative  -pending studies: HIV quant, stool pathogen panel, cryptosporidium, stool O&P, mycoplasma, giardia, sputum culture  -positive strep pneumo urine antigen  -azithro 500 mg PO daily (EOT 10/17), broadened to vanc, zosyn for now, blood cultures pending  -ID consulted  -Biktarvy daily   -bactrim prophylaxis     #L intraarticular femoral fx s/p IMN/ORIF in July 2024  #L knee septic arthritis  :: well-healed scar on exam  -knee painful and warm to touch, CRP 23  -ortho consulted,  patient s/p bedside drainage  -CT femur ordered  -tylenol q6h prn  -gabapentin 300 q8h  -antibiotics above     #PTSD  #polysubstance use disorder (EtOH, cannabis, tobacco)  #Hx multiple TBIs  #Hx agitation  :: based on psych recs from prior admit with multiple concerns for delirium, agitation  -depakote 500 mg BID  -zyprexa 5 mg BID     #protein gap  :: 8.9, noted on admission CMP  :: may be attributable to underlying HIV infection  -SPEP/UPEP pending     F: Replete prn, preferably PO  E: Replete prn  N: Regular diet  A: PIV x1    DVT ppx: Lovenox  GI ppx: PPI  Pain regimen: tylenol, gabapentin  Bowel regimen: none  Abx: vanc, zosyn, azithro  O2: RA  Code Status: FULL CODE  Emergency contact: Bren Rodgers (mother) [phone]     Sergio Rangel MD  PGY-1, Internal Medicine-Pediatrics

## 2024-10-17 ENCOUNTER — APPOINTMENT (OUTPATIENT)
Dept: CARDIOLOGY | Facility: HOSPITAL | Age: 44
DRG: 856 | End: 2024-10-17
Payer: COMMERCIAL

## 2024-10-17 ENCOUNTER — ANESTHESIA EVENT (OUTPATIENT)
Dept: OPERATING ROOM | Facility: HOSPITAL | Age: 44
End: 2024-10-17
Payer: COMMERCIAL

## 2024-10-17 ENCOUNTER — ANESTHESIA (OUTPATIENT)
Dept: OPERATING ROOM | Facility: HOSPITAL | Age: 44
End: 2024-10-17
Payer: COMMERCIAL

## 2024-10-17 ENCOUNTER — APPOINTMENT (OUTPATIENT)
Dept: RADIOLOGY | Facility: HOSPITAL | Age: 44
DRG: 856 | End: 2024-10-17
Payer: COMMERCIAL

## 2024-10-17 LAB
ALBUMIN SERPL BCP-MCNC: 2.9 G/DL (ref 3.4–5)
ANION GAP SERPL CALC-SCNC: 11 MMOL/L (ref 10–20)
BASOPHILS # BLD MANUAL: 0 X10*3/UL (ref 0–0.1)
BASOPHILS NFR BLD MANUAL: 0 %
BUN SERPL-MCNC: 15 MG/DL (ref 6–23)
C COLI+JEJ+UPSA DNA STL QL NAA+PROBE: NOT DETECTED
C DIF TOX TCDA+TCDB STL QL NAA+PROBE: NOT DETECTED
CALCIUM SERPL-MCNC: 9.2 MG/DL (ref 8.6–10.6)
CHLORIDE SERPL-SCNC: 104 MMOL/L (ref 98–107)
CO2 SERPL-SCNC: 25 MMOL/L (ref 21–32)
CREAT SERPL-MCNC: 1 MG/DL (ref 0.5–1.3)
CRYPTOSP AG STL QL IA: NEGATIVE
EC STX1 GENE STL QL NAA+PROBE: NOT DETECTED
EC STX2 GENE STL QL NAA+PROBE: NOT DETECTED
EGFRCR SERPLBLD CKD-EPI 2021: >90 ML/MIN/1.73M*2
EOSINOPHIL # BLD MANUAL: 0 X10*3/UL (ref 0–0.7)
EOSINOPHIL NFR BLD MANUAL: 0 %
ERYTHROCYTE [DISTWIDTH] IN BLOOD BY AUTOMATED COUNT: 12.9 % (ref 11.5–14.5)
G LAMBLIA AG STL QL IA: NEGATIVE
GLUCOSE SERPL-MCNC: 147 MG/DL (ref 74–99)
HCT VFR BLD AUTO: 35.3 % (ref 41–52)
HGB BLD-MCNC: 11.3 G/DL (ref 13.5–17.5)
IMM GRANULOCYTES # BLD AUTO: 0.09 X10*3/UL (ref 0–0.7)
IMM GRANULOCYTES NFR BLD AUTO: 1 % (ref 0–0.9)
LYMPHOCYTES # BLD MANUAL: 0.24 X10*3/UL (ref 1.2–4.8)
LYMPHOCYTES NFR BLD MANUAL: 2.6 %
MAGNESIUM SERPL-MCNC: 2.22 MG/DL (ref 1.6–2.4)
MCH RBC QN AUTO: 32.8 PG (ref 26–34)
MCHC RBC AUTO-ENTMCNC: 32 G/DL (ref 32–36)
MCV RBC AUTO: 102 FL (ref 80–100)
MONOCYTES # BLD MANUAL: 0.55 X10*3/UL (ref 0.1–1)
MONOCYTES NFR BLD MANUAL: 6 %
NEUTROPHILS # BLD MANUAL: 8.41 X10*3/UL (ref 1.2–7.7)
NEUTS BAND # BLD MANUAL: 0.16 X10*3/UL (ref 0–0.7)
NEUTS BAND NFR BLD MANUAL: 1.7 %
NEUTS SEG # BLD MANUAL: 8.25 X10*3/UL (ref 1.2–7)
NEUTS SEG NFR BLD MANUAL: 89.7 %
NOROVIRUS GI + GII RNA STL NAA+PROBE: NOT DETECTED
NRBC BLD-RTO: 0 /100 WBCS (ref 0–0)
PHOSPHATE SERPL-MCNC: 2.7 MG/DL (ref 2.5–4.9)
PLATELET # BLD AUTO: 261 X10*3/UL (ref 150–450)
POTASSIUM SERPL-SCNC: 4.3 MMOL/L (ref 3.5–5.3)
RBC # BLD AUTO: 3.45 X10*6/UL (ref 4.5–5.9)
RBC MORPH BLD: ABNORMAL
RV RNA STL NAA+PROBE: NOT DETECTED
SALMONELLA DNA STL QL NAA+PROBE: NOT DETECTED
SCHISTOCYTES BLD QL SMEAR: ABNORMAL
SHIGELLA DNA SPEC QL NAA+PROBE: NOT DETECTED
SODIUM SERPL-SCNC: 136 MMOL/L (ref 136–145)
TOTAL CELLS COUNTED BLD: 116
TREPONEMA PALLIDUM IGG+IGM AB [PRESENCE] IN SERUM OR PLASMA BY IMMUNOASSAY: NONREACTIVE
V CHOLERAE DNA STL QL NAA+PROBE: NOT DETECTED
WBC # BLD AUTO: 9.2 X10*3/UL (ref 4.4–11.3)
Y ENTEROCOL DNA STL QL NAA+PROBE: NOT DETECTED

## 2024-10-17 PROCEDURE — 1100000001 HC PRIVATE ROOM DAILY

## 2024-10-17 PROCEDURE — 2500000005 HC RX 250 GENERAL PHARMACY W/O HCPCS

## 2024-10-17 PROCEDURE — 2500000005 HC RX 250 GENERAL PHARMACY W/O HCPCS: Performed by: ORTHOPAEDIC SURGERY

## 2024-10-17 PROCEDURE — 80069 RENAL FUNCTION PANEL: CPT

## 2024-10-17 PROCEDURE — 3700000002 HC GENERAL ANESTHESIA TIME - EACH INCREMENTAL 1 MINUTE: Performed by: ORTHOPAEDIC SURGERY

## 2024-10-17 PROCEDURE — 85027 COMPLETE CBC AUTOMATED: CPT

## 2024-10-17 PROCEDURE — 85007 BL SMEAR W/DIFF WBC COUNT: CPT

## 2024-10-17 PROCEDURE — 2500000001 HC RX 250 WO HCPCS SELF ADMINISTERED DRUGS (ALT 637 FOR MEDICARE OP)

## 2024-10-17 PROCEDURE — 83735 ASSAY OF MAGNESIUM: CPT

## 2024-10-17 PROCEDURE — 2500000004 HC RX 250 GENERAL PHARMACY W/ HCPCS (ALT 636 FOR OP/ED)

## 2024-10-17 PROCEDURE — 87506 IADNA-DNA/RNA PROBE TQ 6-11: CPT

## 2024-10-17 PROCEDURE — 3700000001 HC GENERAL ANESTHESIA TIME - INITIAL BASE CHARGE: Performed by: ORTHOPAEDIC SURGERY

## 2024-10-17 PROCEDURE — 3600000004 HC OR TIME - INITIAL BASE CHARGE - PROCEDURE LEVEL FOUR: Performed by: ORTHOPAEDIC SURGERY

## 2024-10-17 PROCEDURE — 93010 ELECTROCARDIOGRAM REPORT: CPT | Performed by: INTERNAL MEDICINE

## 2024-10-17 PROCEDURE — 0SBD0ZZ EXCISION OF LEFT KNEE JOINT, OPEN APPROACH: ICD-10-PCS | Performed by: ORTHOPAEDIC SURGERY

## 2024-10-17 PROCEDURE — 87493 C DIFF AMPLIFIED PROBE: CPT | Performed by: NUTRITIONIST

## 2024-10-17 PROCEDURE — 3600000009 HC OR TIME - EACH INCREMENTAL 1 MINUTE - PROCEDURE LEVEL FOUR: Performed by: ORTHOPAEDIC SURGERY

## 2024-10-17 PROCEDURE — 99222 1ST HOSP IP/OBS MODERATE 55: CPT | Performed by: PSYCHIATRY & NEUROLOGY

## 2024-10-17 PROCEDURE — 87070 CULTURE OTHR SPECIMN AEROBIC: CPT | Performed by: ORTHOPAEDIC SURGERY

## 2024-10-17 PROCEDURE — 0QP904Z REMOVAL OF INTERNAL FIXATION DEVICE FROM LEFT FEMORAL SHAFT, OPEN APPROACH: ICD-10-PCS | Performed by: ORTHOPAEDIC SURGERY

## 2024-10-17 PROCEDURE — A6213 FOAM DRG >16<=48 SQ IN W/BDR: HCPCS | Performed by: ORTHOPAEDIC SURGERY

## 2024-10-17 PROCEDURE — 2500000002 HC RX 250 W HCPCS SELF ADMINISTERED DRUGS (ALT 637 FOR MEDICARE OP, ALT 636 FOR OP/ED)

## 2024-10-17 PROCEDURE — 2720000007 HC OR 272 NO HCPCS: Performed by: ORTHOPAEDIC SURGERY

## 2024-10-17 PROCEDURE — 7100000001 HC RECOVERY ROOM TIME - INITIAL BASE CHARGE: Performed by: ORTHOPAEDIC SURGERY

## 2024-10-17 PROCEDURE — 93005 ELECTROCARDIOGRAM TRACING: CPT

## 2024-10-17 PROCEDURE — C1713 ANCHOR/SCREW BN/BN,TIS/BN: HCPCS | Performed by: ORTHOPAEDIC SURGERY

## 2024-10-17 PROCEDURE — 7100000002 HC RECOVERY ROOM TIME - EACH INCREMENTAL 1 MINUTE: Performed by: ORTHOPAEDIC SURGERY

## 2024-10-17 PROCEDURE — 36415 COLL VENOUS BLD VENIPUNCTURE: CPT

## 2024-10-17 PROCEDURE — 99233 SBSQ HOSP IP/OBS HIGH 50: CPT

## 2024-10-17 PROCEDURE — 0QD90ZZ EXTRACTION OF LEFT FEMORAL SHAFT, OPEN APPROACH: ICD-10-PCS | Performed by: ORTHOPAEDIC SURGERY

## 2024-10-17 DEVICE — K-WIRE, STERILE: Type: IMPLANTABLE DEVICE | Site: KNEE | Status: NON-FUNCTIONAL

## 2024-10-17 DEVICE — IMPLANTABLE DEVICE: Type: IMPLANTABLE DEVICE | Site: KNEE | Status: NON-FUNCTIONAL

## 2024-10-17 RX ORDER — HYDROMORPHONE HYDROCHLORIDE 1 MG/ML
0.2 INJECTION, SOLUTION INTRAMUSCULAR; INTRAVENOUS; SUBCUTANEOUS EVERY 5 MIN PRN
Status: DISCONTINUED | OUTPATIENT
Start: 2024-10-17 | End: 2024-10-17 | Stop reason: HOSPADM

## 2024-10-17 RX ORDER — DIVALPROEX SODIUM 500 MG/1
500 TABLET, FILM COATED, EXTENDED RELEASE ORAL 2 TIMES DAILY
Status: DISCONTINUED | OUTPATIENT
Start: 2024-10-17 | End: 2024-10-20 | Stop reason: HOSPADM

## 2024-10-17 RX ORDER — ROCURONIUM BROMIDE 10 MG/ML
INJECTION, SOLUTION INTRAVENOUS AS NEEDED
Status: DISCONTINUED | OUTPATIENT
Start: 2024-10-17 | End: 2024-10-17

## 2024-10-17 RX ORDER — SODIUM CHLORIDE 0.9 G/100ML
IRRIGANT IRRIGATION AS NEEDED
Status: DISCONTINUED | OUTPATIENT
Start: 2024-10-17 | End: 2024-10-17 | Stop reason: HOSPADM

## 2024-10-17 RX ORDER — OXYCODONE HYDROCHLORIDE 5 MG/1
10 TABLET ORAL EVERY 4 HOURS PRN
Status: DISCONTINUED | OUTPATIENT
Start: 2024-10-17 | End: 2024-10-17 | Stop reason: HOSPADM

## 2024-10-17 RX ORDER — DEXMEDETOMIDINE IN 0.9 % NACL 20 MCG/5ML
SYRINGE (ML) INTRAVENOUS AS NEEDED
Status: DISCONTINUED | OUTPATIENT
Start: 2024-10-17 | End: 2024-10-17

## 2024-10-17 RX ORDER — ONDANSETRON HYDROCHLORIDE 2 MG/ML
INJECTION, SOLUTION INTRAVENOUS AS NEEDED
Status: DISCONTINUED | OUTPATIENT
Start: 2024-10-17 | End: 2024-10-17

## 2024-10-17 RX ORDER — TRANEXAMIC ACID 100 MG/ML
INJECTION, SOLUTION INTRAVENOUS AS NEEDED
Status: DISCONTINUED | OUTPATIENT
Start: 2024-10-17 | End: 2024-10-17

## 2024-10-17 RX ORDER — DROPERIDOL 2.5 MG/ML
0.62 INJECTION, SOLUTION INTRAMUSCULAR; INTRAVENOUS ONCE AS NEEDED
Status: DISCONTINUED | OUTPATIENT
Start: 2024-10-17 | End: 2024-10-17 | Stop reason: HOSPADM

## 2024-10-17 RX ORDER — METOPROLOL TARTRATE 1 MG/ML
INJECTION, SOLUTION INTRAVENOUS AS NEEDED
Status: DISCONTINUED | OUTPATIENT
Start: 2024-10-17 | End: 2024-10-17

## 2024-10-17 RX ORDER — OXYCODONE HYDROCHLORIDE 5 MG/1
5 TABLET ORAL ONCE
Status: COMPLETED | OUTPATIENT
Start: 2024-10-17 | End: 2024-10-17

## 2024-10-17 RX ORDER — PHENYLEPHRINE HCL IN 0.9% NACL 0.4MG/10ML
SYRINGE (ML) INTRAVENOUS AS NEEDED
Status: DISCONTINUED | OUTPATIENT
Start: 2024-10-17 | End: 2024-10-17

## 2024-10-17 RX ORDER — SODIUM CHLORIDE, SODIUM LACTATE, POTASSIUM CHLORIDE, CALCIUM CHLORIDE 600; 310; 30; 20 MG/100ML; MG/100ML; MG/100ML; MG/100ML
100 INJECTION, SOLUTION INTRAVENOUS CONTINUOUS
Status: DISCONTINUED | OUTPATIENT
Start: 2024-10-17 | End: 2024-10-17 | Stop reason: HOSPADM

## 2024-10-17 RX ORDER — MIDAZOLAM HYDROCHLORIDE 1 MG/ML
INJECTION INTRAMUSCULAR; INTRAVENOUS AS NEEDED
Status: DISCONTINUED | OUTPATIENT
Start: 2024-10-17 | End: 2024-10-17

## 2024-10-17 RX ORDER — PROPOFOL 10 MG/ML
INJECTION, EMULSION INTRAVENOUS AS NEEDED
Status: DISCONTINUED | OUTPATIENT
Start: 2024-10-17 | End: 2024-10-17

## 2024-10-17 RX ORDER — LIDOCAINE HYDROCHLORIDE 20 MG/ML
INJECTION, SOLUTION INFILTRATION; PERINEURAL AS NEEDED
Status: DISCONTINUED | OUTPATIENT
Start: 2024-10-17 | End: 2024-10-17

## 2024-10-17 RX ORDER — FENTANYL CITRATE 50 UG/ML
INJECTION, SOLUTION INTRAMUSCULAR; INTRAVENOUS AS NEEDED
Status: DISCONTINUED | OUTPATIENT
Start: 2024-10-17 | End: 2024-10-17

## 2024-10-17 RX ORDER — OLANZAPINE 5 MG/1
5 TABLET, ORALLY DISINTEGRATING ORAL AS NEEDED
Status: DISCONTINUED | OUTPATIENT
Start: 2024-10-17 | End: 2024-10-18

## 2024-10-17 RX ORDER — CEFAZOLIN 1 G/1
INJECTION, POWDER, FOR SOLUTION INTRAVENOUS AS NEEDED
Status: DISCONTINUED | OUTPATIENT
Start: 2024-10-17 | End: 2024-10-17

## 2024-10-17 RX ORDER — SULFAMETHOXAZOLE AND TRIMETHOPRIM 800; 160 MG/1; MG/1
1 TABLET ORAL DAILY
Status: DISCONTINUED | OUTPATIENT
Start: 2024-10-17 | End: 2024-10-20 | Stop reason: HOSPADM

## 2024-10-17 RX ORDER — ACETAMINOPHEN 325 MG/1
650 TABLET ORAL EVERY 4 HOURS PRN
Status: DISCONTINUED | OUTPATIENT
Start: 2024-10-17 | End: 2024-10-17 | Stop reason: HOSPADM

## 2024-10-17 RX ORDER — HYDROMORPHONE HYDROCHLORIDE 1 MG/ML
0.5 INJECTION, SOLUTION INTRAMUSCULAR; INTRAVENOUS; SUBCUTANEOUS EVERY 5 MIN PRN
Status: DISCONTINUED | OUTPATIENT
Start: 2024-10-17 | End: 2024-10-17 | Stop reason: HOSPADM

## 2024-10-17 RX ORDER — OXYCODONE HYDROCHLORIDE 5 MG/1
5 TABLET ORAL EVERY 4 HOURS PRN
Status: DISCONTINUED | OUTPATIENT
Start: 2024-10-17 | End: 2024-10-17 | Stop reason: HOSPADM

## 2024-10-17 RX ORDER — ESMOLOL HYDROCHLORIDE 10 MG/ML
INJECTION INTRAVENOUS AS NEEDED
Status: DISCONTINUED | OUTPATIENT
Start: 2024-10-17 | End: 2024-10-17

## 2024-10-17 SDOH — HEALTH STABILITY: MENTAL HEALTH: CURRENT SMOKER: 1

## 2024-10-17 ASSESSMENT — COGNITIVE AND FUNCTIONAL STATUS - GENERAL
MOBILITY SCORE: 24
DAILY ACTIVITIY SCORE: 24
MOBILITY SCORE: 22
WALKING IN HOSPITAL ROOM: A LITTLE
DAILY ACTIVITIY SCORE: 24
CLIMB 3 TO 5 STEPS WITH RAILING: A LITTLE

## 2024-10-17 ASSESSMENT — PAIN SCALES - GENERAL
PAINLEVEL_OUTOF10: 0 - NO PAIN
PAIN_LEVEL: 2
PAINLEVEL_OUTOF10: 0 - NO PAIN
PAINLEVEL_OUTOF10: 10 - WORST POSSIBLE PAIN

## 2024-10-17 ASSESSMENT — PAIN - FUNCTIONAL ASSESSMENT
PAIN_FUNCTIONAL_ASSESSMENT: 0-10

## 2024-10-17 NOTE — ANESTHESIA PREPROCEDURE EVALUATION
Patient: Romaine Rodgers    Procedure Information       Date/Time: 10/17/24 0715    Procedures:       Debridement Lower Extremity (Left: Knee) - Possible antibiotic spacer/nail      Lower Extremity Hardware Removal (Left: Thigh - Leg Upper)    Location: [facility] OR 01 / [facility]    Surgeons: Sha Chavez MD            Relevant Problems   Pulmonary   (+) Bacterial pneumonia with HIV infection (Multi)   (+) Pneumonia due to infectious organism, unspecified laterality, unspecified part of lung      Neuro   (+) Bipolar 1 disorder (Multi)   (+) PTSD (post-traumatic stress disorder)      ID   (+) Bacterial pneumonia with HIV infection (Multi)   (+) Infection following a procedure, unspecified, sequela   (+) Pneumonia due to infectious organism, unspecified laterality, unspecified part of lung   (+) Pyogenic arthritis of left knee joint (Multi)      Mental Health   (+) Polysubstance abuse (Multi)       Clinical information reviewed:   Tobacco  Allergies  Meds  Problems  Med Hx  Surg Hx   Fam Hx  Soc   Hx        Results for orders placed or performed during the hospital encounter of 10/14/24 (from the past 24 hours)   Body Fluid Cell Count   Result Value Ref Range    Color, Fluid Yellow Colorless, Straw, Yellow    Clarity, Fluid Turbid (A) Clear    WBC, Fluid 83,370 See Comment /uL    RBC, Fluid 19,000 0  /uL /uL   Body Fluid Differential   Result Value Ref Range    Neutrophils %, Manual, Fluid 92 <25 % %    Lymphocytes %, Manual, Fluid 2 <75 % %    Mono/Macrophages %, Manual, Fluid 6 <70 % %    Eosinophils %, Manual, Fluid 0 0 % %    Basophils %, Manual, Fluid 0 0 % %    Immature Granulocytes %, Manual, Fluid 0 0 % %    Blasts %, Manual, Fluid 0 0 % %    Unclassified Cells %, Manual, Fluid 0 0 % %    Plasma Cells %, Manual, Fluid 0 0 % %    Total Cells Counted, Fluid 100    Crystal Identification, Synovial Fluid   Result Value Ref Range    Crystal Identification, Synovial Fluid  No crystals seen  by bright-field or first order red axis polarization microscopy.     No crystals seen by bright-field or first order red axis polarization microscopy.   Sterile Fluid Culture/Smear    Specimen: Aspirate; Fluid   Result Value Ref Range    Gram Stain (4+) Abundant Polymorphonuclear leukocytes     Gram Stain No organisms seen    CBC and Auto Differential   Result Value Ref Range    WBC 6.9 4.4 - 11.3 x10*3/uL    nRBC 0.0 0.0 - 0.0 /100 WBCs    RBC 3.26 (L) 4.50 - 5.90 x10*6/uL    Hemoglobin 10.9 (L) 13.5 - 17.5 g/dL    Hematocrit 32.1 (L) 41.0 - 52.0 %    MCV 99 80 - 100 fL    MCH 33.4 26.0 - 34.0 pg    MCHC 34.0 32.0 - 36.0 g/dL    RDW 12.3 11.5 - 14.5 %    Platelets 248 150 - 450 x10*3/uL    Neutrophils % 79.0 40.0 - 80.0 %    Immature Granulocytes %, Automated 1.0 (H) 0.0 - 0.9 %    Lymphocytes % 10.7 13.0 - 44.0 %    Monocytes % 9.1 2.0 - 10.0 %    Eosinophils % 0.1 0.0 - 6.0 %    Basophils % 0.1 0.0 - 2.0 %    Neutrophils Absolute 5.46 1.20 - 7.70 x10*3/uL    Immature Granulocytes Absolute, Automated 0.07 0.00 - 0.70 x10*3/uL    Lymphocytes Absolute 0.74 (L) 1.20 - 4.80 x10*3/uL    Monocytes Absolute 0.63 0.10 - 1.00 x10*3/uL    Eosinophils Absolute 0.01 0.00 - 0.70 x10*3/uL    Basophils Absolute 0.01 0.00 - 0.10 x10*3/uL   Magnesium   Result Value Ref Range    Magnesium 1.72 1.60 - 2.40 mg/dL   Renal Function Panel   Result Value Ref Range    Glucose 113 (H) 74 - 99 mg/dL    Sodium 132 (L) 136 - 145 mmol/L    Potassium 3.3 (L) 3.5 - 5.3 mmol/L    Chloride 100 98 - 107 mmol/L    Bicarbonate 23 21 - 32 mmol/L    Anion Gap 12 10 - 20 mmol/L    Urea Nitrogen 14 6 - 23 mg/dL    Creatinine 1.04 0.50 - 1.30 mg/dL    eGFR >90 >60 mL/min/1.73m*2    Calcium 9.0 8.6 - 10.6 mg/dL    Phosphorus 2.6 2.5 - 4.9 mg/dL    Albumin 2.7 (L) 3.4 - 5.0 g/dL   Sedimentation rate, automated   Result Value Ref Range    Sedimentation Rate >130 (H) 0 - 15 mm/h   Blood Culture    Specimen: Peripheral Venipuncture; Blood culture   Result  Value Ref Range    Blood Culture Loaded on Instrument - Culture in progress    Blood Culture    Specimen: Peripheral Venipuncture; Blood culture   Result Value Ref Range    Blood Culture Loaded on Instrument - Culture in progress    Type and screen   Result Value Ref Range    ABO TYPE O     Rh TYPE POS     ANTIBODY SCREEN NEG    Coagulation Screen   Result Value Ref Range    Protime 14.8 (H) 9.8 - 12.8 seconds    INR 1.3 (H) 0.9 - 1.1    aPTT 32 27 - 38 seconds   Abo/Rh Group Test   Result Value Ref Range    ABO TYPE O     Rh TYPE POS         NPO Detail:  No data recorded     Physical Exam    Airway  Mallampati: III  TM distance: >3 FB  Neck ROM: full     Cardiovascular - normal exam  Rhythm: regular  Rate: normal     Dental - normal exam     Pulmonary - normal exam  Breath sounds clear to auscultation     Abdominal            Anesthesia Plan    History of general anesthesia?: yes  History of complications of general anesthesia?: no    ASA 3     general     The patient is a current smoker.  Patient did not smoke on day of procedure.    intravenous induction   Postoperative administration of opioids is intended.  Anesthetic plan and risks discussed with patient.  Use of blood products discussed with patient who consented to blood products.    Plan discussed with resident and attending.

## 2024-10-17 NOTE — CARE PLAN
Problem: Pain - Adult  Goal: Verbalizes/displays adequate comfort level or baseline comfort level  Outcome: Progressing     Problem: Safety - Adult  Goal: Free from fall injury  Outcome: Progressing     Problem: Discharge Planning  Goal: Discharge to home or other facility with appropriate resources  Outcome: Progressing   The patient's goals for the shift include      The clinical goals for the shift include pt will remain HDS stable
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
## 2024-10-17 NOTE — PROGRESS NOTES
Transitional Care Coordination Progress Note:  Patient discussed during interdisciplinary rounds.   Team members present: MD, LAURYN  Plan per Medical/Surgical team: OR with ortho today for washout of L knee  Payor: [employer]  Discharge disposition: pending  Potential Barriers: medical  ADOD: 1-2 days  Will continue to monitor for discharge planning needs.     Maeve PERERA, RN  Transitional Care Coordinator (TCC)  [phone]

## 2024-10-17 NOTE — PROGRESS NOTES
"Orthopaedic Surgery Progress Note    S: Evaluated in immediate postoperative period. Pain well controlled considering recent surgery. Denies chest pain, shortness of breath, or fevers.    O:  /57   Pulse 100   Temp 36.4 °C (97.5 °F) (Temporal)   Resp 24   Ht 1.803 m (5' 11\")   Wt 69.9 kg (154 lb)   SpO2 97%   BMI 21.48 kg/m²     Gen: arousable, NAD, appropriately conversational  Cardiac: RRR to peripheral palpation  Resp: nonlabored on RA  GI: soft, nondistended    MSK:  Left Lower Extremity:  -Surgical dressing c/d/i  -Fires DF/PF/EHL/FHL  -SILT in saph/sural/SPN/DPN distributions  -Foot warm, well perfused  -Palpable DP pulse, brisk cap refill  -Compartments soft and compressible      A/P: 44 y.o. male s/p ISAK L femur, I&D L knee, JOCY of L femur on 10/17 with Dr. Chavez.      Plan:  - Weight bearing: NWB LLE, no ROM L knee while drain is in, then can advance to WBAT LLE once drain is out  - DVT PPx: SCDs, DVT chemoppx per primary, however recommend at least 4 weeks of DVT chemoprophylaxis  - Diet: OK for regular diet from orthopedic perspective  - Pain: Multimodal pain regimen per primary  - Antibiotics: IV abx per primary team  - Following intra-op cultures x2  - Dressing: Mepilex remove POD7 (10/24/24)  - Drain: Hemovac drain x1, please record output q8hrs  - PT/OT consult    Dispo: pending cultures, drain    Justino Wolfe MD  PGY-4 Orthopedic Surgery  [facility]  Available by Epic Message     While admitted, this patient will be followed by the Ortho Trauma Team. Please contact below residents with any questions (available via Epic Chat).     First call: Nolberto Olivares, PGY-2  Second call: Bridgette Barajas, PGY-3      "

## 2024-10-17 NOTE — PROGRESS NOTES
"[facility]  Department of Orthopaedic Surgery  10/17/24      Subjective:    No acute events overnight. Pain well controlled. Denies chest pain, shortness of breath, or fevers.    Objective:   /86   Pulse (!) 128   Temp 36 °C (96.8 °F)   Resp 16   Ht 1.803 m (5' 11\")   Wt 69.9 kg (154 lb)   SpO2 99%   BMI 21.48 kg/m²     Gen: arousable, NAD, appropriately conversational  Cardiac: RRR to peripheral palpation  Resp: nonlabored on RA  GI: soft, nondistended    MSK:  Left Lower Extremity:   -Skin intact; prior incision well healed  -Tender at site of injury with painful ROM.  -Fires DF/PF/EHL/FHL  -SILT in saph/sural/SPN/DPN distributions  -Foot warm, well perfused  -Palpable DP pulse, brisk cap refill  -Compartments soft and compressible     Assessment: 44M (HIV, Polysubstance Use, BPD1, L DFFx s/p rIMN + ORIF w/ Dr. Santa (07/06/24)) admitted to medicine for PNA. Now w/ 1-2 days worsening atraumatic L knee pain. NVI, no overlying erythema, moderate effusion, painful A/PROM. XR w/ stable hardware. WBC 8.6, ESR >130, CRP 23. Aspirated 20cc cloudy fluid for 83k cells, 92% PMNs, negative crystals, prelim clx 4+ PMNs no organisms. High concern for septic arthritics.     Plan:  - OR: C/p L knee I&D, L femur ISAK, possible antibiotic spacer w/ Dr. Chavez 10/17  - Appreciate documentation of clearance per primary team when able   - WB: WBAT LLE  - Abx: Vanc, Zosyn  - Diet: NPO at MN for OR tomorrow    - DVT ppx: SCDs, please hold chemo ppx for upcoming OR    Dispo: pending OR    Diana Barajas DO  PGY-3 Orthopedic Surgery  Pager: [phone]  Available by Epic Message    While admitted, this patient will be followed by the Ortho Trauma Team. Please contact below residents with any questions (available via Epic Chat).     First call: Nolberto Olivares, PGY-2  Second call: Diana Barajas, PGY-3    From 6pm-7am, weekends, holidays, and if no answer and there is an emergent issue, please " page orthopaedic consult pager, [phone].
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
## 2024-10-17 NOTE — ANESTHESIA PROCEDURE NOTES
Airway  Date/Time: 10/17/2024 8:06 AM  Urgency: elective    Airway not difficult    Staffing  Performed: resident   Authorized by: Brian Mcneal DO    Performed by: Diana Caceres MD  Patient location during procedure: OR    Indications and Patient Condition  Indications for airway management: anesthesia  Spontaneous ventilation: present  Sedation level: deep  Preoxygenated: yes  Patient position: sniffing  Mask difficulty assessment: 1 - vent by mask  Planned trial extubation    Final Airway Details  Final airway type: endotracheal airway      Successful airway: ETT  Cuffed: yes   Successful intubation technique: direct laryngoscopy  Facilitating devices/methods: intubating stylet  Endotracheal tube insertion site: oral  Blade: Mauro  Blade size: #4  ETT size (mm): 7.5  Cormack-Lehane Classification: grade IIa - partial view of glottis  Placement verified by: chest auscultation, capnometry and palpation of cuff   Measured from: teeth  ETT to teeth (cm): 24    Additional Comments  MAC4 grade 2a view obtained, ETT advanced to 22cm at teeth and cuff inflated. Low tidal volumes and audible leak, additional air added to cuff. Initial improvement in tidal volumes, but on auscultation an audible leak was still heard. Cuff palpated and tight. Video laryngoscopy used to visualize cuff above glottis. ETT advanced to 24cm at teeth and cuff reinflated. Patient with adequate ventilation. Patient remained hemodynamically stable and was oxygenating at 100% throughout.

## 2024-10-17 NOTE — H&P
[facility] Department of Orthopaedic Surgery   Surgical History & Physical <30 Days  10/17/24    Reason for Surgery: L knee SA  Planned Procedure: I&D L knee    History & Physical Reviewed:  I have reviewed the History and Physical for obtained within the last 30 days. Relevant findings and updates are noted below:  No significant changes.    Home medications were reviewed with significant updates noted below:  No significant changes.    ERAS patient?: No    COVID-19 Risk Consent:   Surgeon has reviewed the key risks related to jim COVID-19 and subsequent sequelae.     10/17/24 at 12:24 AM - Armond Baltazar MD

## 2024-10-17 NOTE — CARE PLAN
Problem: Pain - Adult  Goal: Verbalizes/displays adequate comfort level or baseline comfort level  Outcome: Progressing     Problem: Safety - Adult  Goal: Free from fall injury  Outcome: Progressing     Problem: Discharge Planning  Goal: Discharge to home or other facility with appropriate resources  Outcome: Progressing     Problem: Chronic Conditions and Co-morbidities  Goal: Patient's chronic conditions and co-morbidity symptoms are monitored and maintained or improved  Outcome: Progressing     Problem: Skin  Goal: Decreased wound size/increased tissue granulation at next dressing change  Outcome: Progressing  Goal: Participates in plan/prevention/treatment measures  Outcome: Progressing  Goal: Prevent/manage excess moisture  Outcome: Progressing  Goal: Prevent/minimize sheer/friction injuries  Outcome: Progressing  Goal: Promote/optimize nutrition  Outcome: Progressing  Goal: Promote skin healing  Outcome: Progressing   The patient's goals for the shift include      The clinical goals for the shift include Pt will remain npo to prepare for procedure.

## 2024-10-17 NOTE — ANESTHESIA POSTPROCEDURE EVALUATION
Patient: Romaine Rodgers    Procedure Summary       Date: 10/17/24 Room / Location: [facility] OR 01 / [facility] OR    Anesthesia Start: 0744 Anesthesia Stop: 1029    Procedures:       Debridement Lower Extremity (Left: Knee)      Lower Extremity Hardware Removal (Left: Thigh - Leg Upper) Diagnosis:       Pyogenic arthritis of left knee joint, due to unspecified organism (Multi)      Infection following a procedure, unspecified, sequela      (Pyogenic arthritis of left knee joint, due to unspecified organism (Multi) [M00.9])      (Infection following a procedure, unspecified, sequela [T81.40XS])    Surgeons: Sha Chavez MD Responsible Provider: Brian Mcneal DO    Anesthesia Type: general ASA Status: 3            Anesthesia Type: general    Vitals Value Taken Time   /80 10/17/24 1032   Temp 37.4 10/17/24 1032   Pulse 104 10/17/24 1030   Resp 23 10/17/24 1030   SpO2 96 % 10/17/24 1030   Vitals shown include unfiled device data.    Anesthesia Post Evaluation    Patient location during evaluation: PACU  Patient participation: complete - patient participated  Level of consciousness: awake and alert and confused  Pain score: 2  Pain management: adequate  Multimodal analgesia pain management approach  Airway patency: patent  Two or more strategies used to mitigate risk of obstructive sleep apnea  Cardiovascular status: acceptable and blood pressure returned to baseline  Respiratory status: acceptable, airway suctioned, face mask and spontaneous ventilation  Hydration status: acceptable  Postoperative Nausea and Vomiting: none        No notable events documented.

## 2024-10-18 LAB
ALBUMIN SERPL BCP-MCNC: 2.5 G/DL (ref 3.4–5)
ANION GAP SERPL CALC-SCNC: 15 MMOL/L (ref 10–20)
BASOPHILS # BLD AUTO: 0.01 X10*3/UL (ref 0–0.1)
BASOPHILS NFR BLD AUTO: 0.1 %
BUN SERPL-MCNC: 16 MG/DL (ref 6–23)
CALCIUM SERPL-MCNC: 9 MG/DL (ref 8.6–10.6)
CHLORIDE SERPL-SCNC: 101 MMOL/L (ref 98–107)
CO2 SERPL-SCNC: 23 MMOL/L (ref 21–32)
CREAT SERPL-MCNC: 1.1 MG/DL (ref 0.5–1.3)
EGFRCR SERPLBLD CKD-EPI 2021: 86 ML/MIN/1.73M*2
EOSINOPHIL # BLD AUTO: 0 X10*3/UL (ref 0–0.7)
EOSINOPHIL NFR BLD AUTO: 0 %
ERYTHROCYTE [DISTWIDTH] IN BLOOD BY AUTOMATED COUNT: 12.6 % (ref 11.5–14.5)
FUNGUS SPEC CULT: NORMAL
FUNGUS SPEC FUNGUS STN: NORMAL
GLUCOSE SERPL-MCNC: 146 MG/DL (ref 74–99)
HCT VFR BLD AUTO: 29 % (ref 41–52)
HGB BLD-MCNC: 9.7 G/DL (ref 13.5–17.5)
IGA SERPL-MCNC: 206 MG/DL (ref 70–400)
IGG SERPL-MCNC: 2540 MG/DL (ref 700–1600)
IGM SERPL-MCNC: 86 MG/DL (ref 40–230)
IMM GRANULOCYTES # BLD AUTO: 0.08 X10*3/UL (ref 0–0.7)
IMM GRANULOCYTES NFR BLD AUTO: 0.9 % (ref 0–0.9)
LYMPHOCYTES # BLD AUTO: 0.78 X10*3/UL (ref 1.2–4.8)
LYMPHOCYTES NFR BLD AUTO: 8.5 %
MAGNESIUM SERPL-MCNC: 1.92 MG/DL (ref 1.6–2.4)
MCH RBC QN AUTO: 33.1 PG (ref 26–34)
MCHC RBC AUTO-ENTMCNC: 33.4 G/DL (ref 32–36)
MCV RBC AUTO: 99 FL (ref 80–100)
MONOCYTES # BLD AUTO: 0.71 X10*3/UL (ref 0.1–1)
MONOCYTES NFR BLD AUTO: 7.7 %
NEUTROPHILS # BLD AUTO: 7.61 X10*3/UL (ref 1.2–7.7)
NEUTROPHILS NFR BLD AUTO: 82.8 %
NRBC BLD-RTO: 0 /100 WBCS (ref 0–0)
PHOSPHATE SERPL-MCNC: 2.4 MG/DL (ref 2.5–4.9)
PLATELET # BLD AUTO: 274 X10*3/UL (ref 150–450)
POTASSIUM SERPL-SCNC: 4.1 MMOL/L (ref 3.5–5.3)
RBC # BLD AUTO: 2.93 X10*6/UL (ref 4.5–5.9)
SODIUM SERPL-SCNC: 135 MMOL/L (ref 136–145)
VANCOMYCIN SERPL-MCNC: 9.5 UG/ML (ref 5–20)
WBC # BLD AUTO: 9.2 X10*3/UL (ref 4.4–11.3)

## 2024-10-18 PROCEDURE — 1100000001 HC PRIVATE ROOM DAILY

## 2024-10-18 PROCEDURE — 36415 COLL VENOUS BLD VENIPUNCTURE: CPT

## 2024-10-18 PROCEDURE — 2500000004 HC RX 250 GENERAL PHARMACY W/ HCPCS (ALT 636 FOR OP/ED)

## 2024-10-18 PROCEDURE — 99233 SBSQ HOSP IP/OBS HIGH 50: CPT | Performed by: INTERNAL MEDICINE

## 2024-10-18 PROCEDURE — 82784 ASSAY IGA/IGD/IGG/IGM EACH: CPT

## 2024-10-18 PROCEDURE — 85025 COMPLETE CBC W/AUTO DIFF WBC: CPT

## 2024-10-18 PROCEDURE — 80069 RENAL FUNCTION PANEL: CPT

## 2024-10-18 PROCEDURE — 82746 ASSAY OF FOLIC ACID SERUM: CPT

## 2024-10-18 PROCEDURE — 99233 SBSQ HOSP IP/OBS HIGH 50: CPT

## 2024-10-18 PROCEDURE — 87591 N.GONORRHOEAE DNA AMP PROB: CPT

## 2024-10-18 PROCEDURE — 2500000001 HC RX 250 WO HCPCS SELF ADMINISTERED DRUGS (ALT 637 FOR MEDICARE OP)

## 2024-10-18 PROCEDURE — 83735 ASSAY OF MAGNESIUM: CPT

## 2024-10-18 PROCEDURE — 2500000002 HC RX 250 W HCPCS SELF ADMINISTERED DRUGS (ALT 637 FOR MEDICARE OP, ALT 636 FOR OP/ED)

## 2024-10-18 PROCEDURE — 80202 ASSAY OF VANCOMYCIN: CPT

## 2024-10-18 PROCEDURE — 83521 IG LIGHT CHAINS FREE EACH: CPT

## 2024-10-18 PROCEDURE — 87491 CHLMYD TRACH DNA AMP PROBE: CPT

## 2024-10-18 RX ORDER — VANCOMYCIN HYDROCHLORIDE 1 G/200ML
1000 INJECTION, SOLUTION INTRAVENOUS EVERY 12 HOURS
Status: DISCONTINUED | OUTPATIENT
Start: 2024-10-18 | End: 2024-10-19

## 2024-10-18 RX ORDER — OXYCODONE HYDROCHLORIDE 5 MG/1
5 TABLET ORAL EVERY 6 HOURS PRN
Status: DISCONTINUED | OUTPATIENT
Start: 2024-10-18 | End: 2024-10-20 | Stop reason: HOSPADM

## 2024-10-18 RX ORDER — ACETAMINOPHEN 325 MG/1
650 TABLET ORAL EVERY 6 HOURS
Status: DISCONTINUED | OUTPATIENT
Start: 2024-10-18 | End: 2024-10-20 | Stop reason: HOSPADM

## 2024-10-18 RX ORDER — OLANZAPINE 5 MG/1
5 TABLET, ORALLY DISINTEGRATING ORAL EVERY 8 HOURS PRN
Status: DISCONTINUED | OUTPATIENT
Start: 2024-10-18 | End: 2024-10-20 | Stop reason: HOSPADM

## 2024-10-18 ASSESSMENT — COGNITIVE AND FUNCTIONAL STATUS - GENERAL
MOBILITY SCORE: 24
DAILY ACTIVITIY SCORE: 24

## 2024-10-18 ASSESSMENT — PAIN SCALES - PAIN ASSESSMENT IN ADVANCED DEMENTIA (PAINAD): TOTALSCORE: MEDICATION (SEE MAR)

## 2024-10-18 ASSESSMENT — PAIN SCALES - GENERAL
PAINLEVEL_OUTOF10: 0 - NO PAIN
PAINLEVEL_OUTOF10: 10 - WORST POSSIBLE PAIN

## 2024-10-18 NOTE — PROGRESS NOTES
Vancomycin Dosing by Pharmacy- FOLLOW UP    Romaine Rodgers is a 44 y.o. year old male who Pharmacy has been consulted for vancomycin dosing for osteomyelitis/septic arthritis. Based on the patient's indication and renal status this patient is being dosed based on a goal AUC of 400-600.     Renal function is currently stable.    Current vancomycin dose: 750 mg given every 12 hours    Estimated vancomycin AUC on current dose: 321 mg/L.hr     Visit Vitals  /70 (BP Location: Right arm, Patient Position: Lying)   Pulse (!) 114   Temp 37.1 °C (98.8 °F) (Skin)   Resp 19        Lab Results   Component Value Date    CREATININE 1.10 10/18/2024    CREATININE 1.00 10/17/2024    CREATININE 1.04 10/16/2024    CREATININE 1.30 10/15/2024        Patient weight is as follows:   Vitals:    10/14/24 0300   Weight: 69.9 kg (154 lb)       Cultures:  No results found for the encounter in last 14 days.       I/O last 3 completed shifts:  In: 510 (7.3 mL/kg) [I.V.:110 (1.6 mL/kg); IV Piggyback:400]  Out: 2020 (28.9 mL/kg) [Urine:1750 (0.7 mL/kg/hr); Drains:20; Blood:250]  Weight: 69.9 kg   I/O during current shift:  No intake/output data recorded.    Temp (24hrs), Av.6 °C (97.8 °F), Min:36 °C (96.8 °F), Max:37.1 °C (98.8 °F)      Assessment/Plan    Below goal AUC. Orders placed for new vancomcyin regimen of 1000 every 12 hours to begin at 1000.     This dosing regimen is predicted by [employer] to result in the following pharmacokinetic parameters:  Loading dose: N/A  Regimen: 1000 mg IV every 12 hours.  Start time: 10:34 on 10/18/2024  Exposure target: AUC24 (range)400-600 mg/L.hr   WRM01-06: 417 mg/L.hr  AUC24,ss: 433 mg/L.hr  Probability of AUC24 > 400: 68 %  Ctrough,ss: 12.2 mg/L  Probability of Ctrough,ss > 20: 4 %      The next level will be obtained on 10/20 at 0500. May be obtained sooner if clinically indicated.   Will continue to monitor renal function daily while on vancomycin and order serum creatinine at least every  48 hours if not already ordered.  Follow for continued vancomycin needs, clinical response, and signs/symptoms of toxicity.       Kristin Lao, PharmD   Roselyn Lincoln 3 Pharmacist

## 2024-10-18 NOTE — CARE PLAN
Problem: Safety - Adult  Goal: Free from fall injury  Outcome: Progressing     Problem: Discharge Planning  Goal: Discharge to home or other facility with appropriate resources  Outcome: Progressing     Problem: Chronic Conditions and Co-morbidities  Goal: Patient's chronic conditions and co-morbidity symptoms are monitored and maintained or improved  Outcome: Progressing   The patient's goals for the shift include      The clinical goals for the shift include pt will remain HDS stable

## 2024-10-18 NOTE — PROGRESS NOTES
"Orthopaedic Surgery Progress Note    S: Tachy overnight to 110-120s, otherwise afebrile. VSS. Patient resting comfortably in bed this AM. States his pain in his knee is still significant but somewhat improved. Denies N/T, SOB, CP, fever or chills.    O:  BP (!) 127/95 (BP Location: Right arm, Patient Position: Lying)   Pulse (!) 127   Temp 36.2 °C (97.2 °F) (Skin)   Resp 18   Ht 1.803 m (5' 11\")   Wt 69.9 kg (154 lb)   SpO2 94%   BMI 21.48 kg/m²     Gen: arousable, NAD, appropriately conversational  Cardiac: RRR to peripheral palpation  Resp: nonlabored on RA  GI: soft, nondistended    MSK:  Left Lower Extremity:  -Surgical dressing c/d/I  -HV drain x1 w/ bloody OP  -Fires DF/PF/EHL/FHL  -SILT in saph/sural/SPN/DPN distributions  -Foot warm, well perfused  -Palpable DP pulse, brisk cap refill  -Compartments soft and compressible      A/P: 44 y.o. male s/p ISAK L femur, I&D L knee, JOCY of L femur on 10/17 with Dr. Chavez.      Plan:  - Weight bearing: NWB LLE, no ROM L knee while drain is in, then can advance to WBAT LLE once drain is out  - DVT PPx: SCDs, DVT chemoppx per primary, however recommend at least 4 weeks of DVT chemoprophylaxis  - Diet: OK for regular diet from orthopedic perspective  - Pain: Multimodal pain regimen per primary  - Antibiotics: IV abx per primary team  - Following intra-op cultures x2  - Dressing: Mepilex remove POD7 (10/24/24)  - Drain: Hemovac drain x1, please record output q8hrs   - Output 10/18 not recorded/20 bloody OP, maintain till PT/OT today  - PT/OT consult    Dispo: pending cultures, drain    Nolberto Olivares MD  PGY-2 Orthopedic Surgery  [facility]  Available by Epic Message     While admitted, this patient will be followed by the Ortho Trauma Team. Please contact below residents with any questions (available via Epic Chat).     First call: Nolberto Olivares PGY-2  Second call: Bridgette Barajas, PGY-3      "

## 2024-10-18 NOTE — PROGRESS NOTES
"PSYCHIATRY CONSULT-LIAISON PROGRESS NOTE    SUBJECTIVE  Romaine Rodgers is a 44 y.o. male with a past psychiatric history of Mild/Borderline Intellectual Disability, cPTSD, Alcohol Use Disorder, Tobacco Use Disorder, Cannabis Use Disorder, and PMHx HIV (dx 2004, not on treatment), multiple TBIs w/ LOC, self-reported cardiac anomaly who was admitted to [facility] on 10/14 for PNA and LLE septic arthritis. Psychiatry was consulted on 10/17 for \"strange behavior.\" On initial assessment, inattention suggestive of delirium i/s/o recent surgery and current infections.    ==========    Patient reported good mood and optimism regarding his treatment--pain is present but improved. He is interested in going outside if possible. Patient is eager to return home, but amenable to staying through the weekend to ensure his medical problems are adequately addressed. Endorsed intermittent visions of his cat in the room throughout the day; patient stated that he is aware that these are hallucinations and he is not troubled by them. He also reported nightmares of people chasing him overnight, but denied any impact of these nightmares to his mood or quality of sleep. Endorsed good sleep and appetite.    We revisited the question of medication adherence for HIV, and patient stated that he takes the medication in hospital because \"for [the doctors]\" but does not take them at home because \"[he doesn't] want to.\"     OBJECTIVE  VITALS      10/17/2024    10:26 AM 10/17/2024    10:30 AM 10/17/2024    10:45 AM 10/17/2024    11:30 AM 10/17/2024     3:03 PM 10/17/2024     7:52 PM 10/18/2024     6:34 AM   Vitals   Systolic 117 117 110 116 104 127 133   Diastolic 75 75 57 86 66 95 70   Heart Rate 104 104 100 99 110 127 114   Temp 36.4 °C (97.5 °F)  36 °C (96.8 °F) 36.6 °C (97.9 °F) 37 °C (98.6 °F) 36.2 °C (97.2 °F) 37.1 °C (98.8 °F)   Resp 23 23 24  18 18 19        MENTAL STATUS EXAM  Appearance: naked, lying comfortably in bed, occasionally moving " "surgical drain  Attitude: pleasant, cooperative, and engaged in conversation.  Behavior: Appropriate eye contact  Motor Activity: No psychomotor agitation or retardation. No abnormal movements, tremors or tics. No evidence of extrapyramidal symptoms or tardive dyskinesia.   Speech: Fluctuating volume depending on topic. Regular rate, tone, and quantity. Spontaneous, clear, coherent. No pressured speech.  Mood: \"pretty good\"  Affect: Mood congruent. Full-range. Non-labile.  Thought Process: mildly loose associations (e.g. stories about his friends at home, his philosophy on social interactions)  Thought Content: would not clearly answer yes/no to question about SI/HI (\"what the...why would I do something stupid like that\"); no delusions elicited.  Thought Perception: sees his cat in the room but knows cat is not there  Cognition: Alert and grossly oriented. No deficits in attention, concentration or language. Able to answer questions appropriately throughout interview. Memory and fund of knowledge appear limited.  Insight: limited; does not fully understand his psychiatric regimen  Judgement: fair     CURRENT MEDICATIONS  Scheduled medications  ferzrnpzk-vyksdoey-rglwdem ala, 1 tablet, oral, Daily  divalproex, 500 mg, oral, BID  enoxaparin, 40 mg, subcutaneous, q24h  gabapentin, 300 mg, oral, q8h NOMAN  OLANZapine, 5 mg, oral, BID  piperacillin-tazobactam, 4.5 g, intravenous, q6h  polyethylene glycol, 17 g, oral, Daily  sulfamethoxazole-trimethoprim, 1 tablet, oral, Daily  vancomycin, 1,000 mg, intravenous, q12h    Continuous medications     PRN medications  PRN medications: acetaminophen, OLANZapine zydis, traZODone, vancomycin     LABS  Results for orders placed or performed during the hospital encounter of 10/14/24 (from the past 24 hours)   CBC and Auto Differential   Result Value Ref Range    WBC 9.2 4.4 - 11.3 x10*3/uL    nRBC 0.0 0.0 - 0.0 /100 WBCs    RBC 3.45 (L) 4.50 - 5.90 x10*6/uL    Hemoglobin 11.3 (L) " 13.5 - 17.5 g/dL    Hematocrit 35.3 (L) 41.0 - 52.0 %     (H) 80 - 100 fL    MCH 32.8 26.0 - 34.0 pg    MCHC 32.0 32.0 - 36.0 g/dL    RDW 12.9 11.5 - 14.5 %    Platelets 261 150 - 450 x10*3/uL    Immature Granulocytes %, Automated 1.0 (H) 0.0 - 0.9 %    Immature Granulocytes Absolute, Automated 0.09 0.00 - 0.70 x10*3/uL   Magnesium   Result Value Ref Range    Magnesium 2.22 1.60 - 2.40 mg/dL   Renal Function Panel   Result Value Ref Range    Glucose 147 (H) 74 - 99 mg/dL    Sodium 136 136 - 145 mmol/L    Potassium 4.3 3.5 - 5.3 mmol/L    Chloride 104 98 - 107 mmol/L    Bicarbonate 25 21 - 32 mmol/L    Anion Gap 11 10 - 20 mmol/L    Urea Nitrogen 15 6 - 23 mg/dL    Creatinine 1.00 0.50 - 1.30 mg/dL    eGFR >90 >60 mL/min/1.73m*2    Calcium 9.2 8.6 - 10.6 mg/dL    Phosphorus 2.7 2.5 - 4.9 mg/dL    Albumin 2.9 (L) 3.4 - 5.0 g/dL   Manual Differential   Result Value Ref Range    Neutrophils %, Manual 89.7 40.0 - 80.0 %    Bands %, Manual 1.7 0.0 - 5.0 %    Lymphocytes %, Manual 2.6 13.0 - 44.0 %    Monocytes %, Manual 6.0 2.0 - 10.0 %    Eosinophils %, Manual 0.0 0.0 - 6.0 %    Basophils %, Manual 0.0 0.0 - 2.0 %    Seg Neutrophils Absolute, Manual 8.25 (H) 1.20 - 7.00 x10*3/uL    Bands Absolute, Manual 0.16 0.00 - 0.70 x10*3/uL    Lymphocytes Absolute, Manual 0.24 (L) 1.20 - 4.80 x10*3/uL    Monocytes Absolute, Manual 0.55 0.10 - 1.00 x10*3/uL    Eosinophils Absolute, Manual 0.00 0.00 - 0.70 x10*3/uL    Basophils Absolute, Manual 0.00 0.00 - 0.10 x10*3/uL    Total Cells Counted 116     Neutrophils Absolute, Manual 8.41 (H) 1.20 - 7.70 x10*3/uL    RBC Morphology See Below     RBC Fragments Few    Vancomycin   Result Value Ref Range    Vancomycin 9.5 5.0 - 20.0 ug/mL   CBC and Auto Differential   Result Value Ref Range    WBC 9.2 4.4 - 11.3 x10*3/uL    nRBC 0.0 0.0 - 0.0 /100 WBCs    RBC 2.93 (L) 4.50 - 5.90 x10*6/uL    Hemoglobin 9.7 (L) 13.5 - 17.5 g/dL    Hematocrit 29.0 (L) 41.0 - 52.0 %    MCV 99 80 - 100 fL     MCH 33.1 26.0 - 34.0 pg    MCHC 33.4 32.0 - 36.0 g/dL    RDW 12.6 11.5 - 14.5 %    Platelets 274 150 - 450 x10*3/uL    Neutrophils % 82.8 40.0 - 80.0 %    Immature Granulocytes %, Automated 0.9 0.0 - 0.9 %    Lymphocytes % 8.5 13.0 - 44.0 %    Monocytes % 7.7 2.0 - 10.0 %    Eosinophils % 0.0 0.0 - 6.0 %    Basophils % 0.1 0.0 - 2.0 %    Neutrophils Absolute 7.61 1.20 - 7.70 x10*3/uL    Immature Granulocytes Absolute, Automated 0.08 0.00 - 0.70 x10*3/uL    Lymphocytes Absolute 0.78 (L) 1.20 - 4.80 x10*3/uL    Monocytes Absolute 0.71 0.10 - 1.00 x10*3/uL    Eosinophils Absolute 0.00 0.00 - 0.70 x10*3/uL    Basophils Absolute 0.01 0.00 - 0.10 x10*3/uL   Magnesium   Result Value Ref Range    Magnesium 1.92 1.60 - 2.40 mg/dL   Renal Function Panel   Result Value Ref Range    Glucose 146 (H) 74 - 99 mg/dL    Sodium 135 (L) 136 - 145 mmol/L    Potassium 4.1 3.5 - 5.3 mmol/L    Chloride 101 98 - 107 mmol/L    Bicarbonate 23 21 - 32 mmol/L    Anion Gap 15 10 - 20 mmol/L    Urea Nitrogen 16 6 - 23 mg/dL    Creatinine 1.10 0.50 - 1.30 mg/dL    eGFR 86 >60 mL/min/1.73m*2    Calcium 9.0 8.6 - 10.6 mg/dL    Phosphorus 2.4 (L) 2.5 - 4.9 mg/dL    Albumin 2.5 (L) 3.4 - 5.0 g/dL      IMAGING  US scrotum w doppler    Result Date: 10/17/2024  Interpreted By:  Toño Wilson,  and Dervishi Humberto STUDY: US SCROTUM WITH DOPPLER;  10/16/2024 8:48 pm   INDICATION: Signs/Symptoms:ulcer and swelling.     COMPARISON: None.   ACCESSION NUMBER(S): [id number]   ORDERING CLINICIAN: CHRIS KELLEY   TECHNIQUE: Multiple ultrasonographic images of scrotum and tested were obtained.   FINDINGS: RIGHT HEMISCROTUM:     RIGHT TESTICLE:   There is a small right-sided hydrocele. The right testicle measures 2.9 cm x 1.8 cmx 1.9 cm. The right testicle demonstrates a homogeneous echotexture and normal contour. Normal vascularity and Doppler waveforms are observed in the right testicle.   RIGHT EPIDIDYMIS: The right epididymal head measures 0.7 cm  x 0.7 cm x 1.0 cm and is within normal limits.   LEFT HEMISCROTUM:   LEFT TESTICLE: The left testicle measures 2.1 cm x 1.3 cm x 3.2 cm. The left testicle demonstrates a homogeneous echotexture and normal contour. There is up to 3 mm of the left pampiniform plexus on Valsalva consistent with varicocele. Normal vascularity and Doppler waveforms are observed in the left testicle.   LEFT EPIDIDYMIS: The left epididymal head measures 0.4 cm x 0.6 cm x 0.7 cm and is within normal limits.       Small right-sided hydrocele and mild-to-moderate left sided varicocele. Otherwise unremarkable scrotal/testicular ultrasound.   I personally reviewed the images/study and I agree with the findings as stated by Resident Humberto Corbin MD.   MACRO: None     Signed by: Toño Wilson 10/17/2024 10:06 AM Dictation workstation:   [id number]    FL fluoro images no charge    Result Date: 10/17/2024  These images are not reportable by radiology and will not be interpreted by  Radiologists.    CT femur left wo IV contrast    Result Date: 10/16/2024  Interpreted By:  Pascual Verma, STUDY: CT FEMUR LEFT WO IV CONTRAST; ;  10/16/2024 11:22 am   INDICATION: Signs/Symptoms:ballistic distal femur fracture 07/24 w/ new c/f septic arthritis, evaluate for complete fx healing for hardware removal.     COMPARISON: Plain film radiographs performed earlier the same day.   ACCESSION NUMBER(S): [id number]   ORDERING CLINICIAN: CHRIS KELLEY   TECHNIQUE: Multiple thin-section axial images of the left femur performed without contrast and reconstructed in the sagittal and coronal plane.   FINDINGS:   Again note is made of ORIF of the previous distal femoral fracture with a long intramedullary manny and proximal and distal locking screws.   The left distal femoral fracture of the medial supracondylar femur mostly with some medial fragmentation and a small amount of medial metaphyseal cortical disruption. The fracture does not look across the  entirety of the femur and there is some heterotopic ossification suggesting some healing but the fracture line still evident of the medial condyle.   New sizable joint effusion.   No bony destructive changes.   Hardware shows no evidence of complication with no periprosthetic lucency or malalignment.   No additional new fractures are seen.   No muscular attenuation changes.   Mild soft tissue edema surrounding the distal femur.   No other focal fluid collections.       Satisfactory appearance status post ORIF left distal femoral fracture.   New large joint effusion, nonspecific. Septic arthritis not excluded and arthrocentesis can be considered.     MACRO: None   Signed by: Pascual Verma 10/16/2024 12:49 PM Dictation workstation:   [id number]    XR knee left 4+ views    Result Date: 10/16/2024  Interpreted By:  Pascual Verma,  Lewis Esposito STUDY: XR KNEE LEFT 4+ VIEWS; ;  10/16/2024 10:06 am   INDICATION: Signs/Symptoms:c/f septic arthritis.   COMPARISON: Comparison radiograph 07/14/2024.   ACCESSION NUMBER(S): [id number]   ORDERING CLINICIAN: CHRIS KELLEY   FINDINGS: Similar postsurgical changes of intramedullary nail in screw fixation of left distal femoral fracture. No perihardware fractures or lucencies. Mild-to-moderate tricompartmental osteoarthrosis of the knee joint.   Interval development of a sizable effusion.       1. New joint effusion left knee. No acute erosive changes or hardware complication. If there is clinical concern for septic arthritis, arthrocentesis is recommended for further evaluation   I personally reviewed the images/study and I agree with the findings as stated by Vaibhav Correa MD. This study was interpreted at [facility], [place], [place].   MACRO: None.   Signed by: Pascual Verma 10/16/2024 11:09 AM Dictation workstation:   [id number]      PSYCHIATRIC RISK ASSESSMENT  Acute Risk of Harm to Others is Considered: Low  Acute Risk of Harm to Self is  "Considered: Low    ASSESSMENT AND PLAN  Romaine oRdgers is a 44 y.o. male with a past psychiatric history of Mild/Borderline Intellectual Disability, cPTSD, Alcohol Use Disorder, Tobacco Use Disorder, Cannabis Use Disorder, and PMHx HIV (dx 2004, not on treatment), multiple TBIs w/ LOC, self-reported cardiac anomaly who was admitted to [facility] on 10/14 for PNA and LLE septic arthritis. Psychiatry was consulted on 10/17 for \"strange behavior.\"     On initial assessment, inattention suggestive of delirium i/s/o recent surgery and current infections. At this time, no clinical signs suggestive of withdrawal, sean or psychosis. Patient will likely benefit from remaining on his previously recommended psychiatric medications and stringent delirium precautions.     IMPRESSION  Delirium, hyperactive, multifactorial--resolving  Mild/Borderline Intellectual Disability  cPTSD  Alcohol Use Disorder  Tobacco Use Disorder  Cannabis Use Disorder     RECOMMENDATIONS  Safety:  - Patient does not currently meet criteria for inpatient psychiatric admission.   - To evaluate decision-making capacity, recommend use of the Capacity Evaluation Tool. Search “[facility] Capacity Evaluation\" under SmartText unless the patient has a legal guardian, in which case all decisions per the legal guardian.  - Defer to primary team decision for 1:1 sitter.  - If delirium worsens, can consider Med-Psych unit for co-management  - As with all hospitalized patients, would recommend delirium precautions, as below.     Medications:  - continue Depakote ER PO 500mg twice daily  - continue Zyprexa PO 5mg BID  - continue Zyprexa ODT 5mg q8h PRN for agitation     Work-up:  - EKG (7/2024): QTc of 425 -> EKG (10/17) QTc 437     Delirium precautions:  - Minimize use of benzos, opiates, anticholinergics, as these may worsen mental status  - Would use caution with narcotic pain medications  - Would still adequately controlling pain, as uncontrolled pain is also a risk " factor for delirium  - Reinforce sleep hygiene; encourage patient to stay awake during the day  - Keep curtains/blinds open during the day and closed at night.  - Would recommend reorienting/redirecting patient as much as possible,   - Aim for consistent staffing, familiar objects, avoiding bright lights and loud noises, etc.  - Can consider melatonin at night before bed.      Ancillary Services:  - Recommend pet/music/art therapy consult at primary team's discretion     Follow-up:  - patient was not interested in outpatient Psychiatry at this time--please reach out to Psychiatry if this changes     ==========  - Discussed recommendations with primary team.  - Psychiatry will continue to follow.     Thank you for allowing us to participate in the care of this patient. Please page [phone] with any questions or concerns.     Patient seen and discussed with Dr. Mckeon, who agrees with above plan.     Nicol Harp MD  PGY-4 Neurology    Medication Consent  Medication Consent: n/a; consult service
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
## 2024-10-18 NOTE — PROGRESS NOTES
Romaine Rodgers is a 44 y.o. male on day 4 of admission presenting with Bacterial pneumonia with HIV infection (Multi).    Subjective   -NAEO, received spot dose oxycodone 5 mg x1 for pain overnight  -no complaints this morning, tolerating wound vac well.     Objective     Last Recorded Vitals  /70 (BP Location: Right arm, Patient Position: Lying)   Pulse (!) 114   Temp 37.1 °C (98.8 °F) (Skin)   Resp 19   Wt 69.9 kg (154 lb)   SpO2 99%   Intake/Output last 3 Shifts:    Intake/Output Summary (Last 24 hours) at 10/18/2024 1057  Last data filed at 10/18/2024 0500  Gross per 24 hour   Intake 110 ml   Output 770 ml   Net -660 ml     Admission Weight  Weight: 69.9 kg (154 lb) (10/14/24 0300)    Daily Weight  10/14/24 : 69.9 kg (154 lb)    Image Results  US scrotum w doppler  Narrative: Interpreted By:  Toño Wilson,  and Shaquille Paul   STUDY:  US SCROTUM WITH DOPPLER;  10/16/2024 8:48 pm      INDICATION:  Signs/Symptoms:ulcer and swelling.          COMPARISON:  None.      ACCESSION NUMBER(S):  [id number]      ORDERING CLINICIAN:  CHRIS KELLEY      TECHNIQUE:  Multiple ultrasonographic images of scrotum and tested were obtained.      FINDINGS:  RIGHT HEMISCROTUM:          RIGHT TESTICLE:      There is a small right-sided hydrocele.  The right testicle measures 2.9 cm x 1.8 cmx 1.9 cm. The right  testicle demonstrates a homogeneous echotexture and normal contour.  Normal vascularity and Doppler waveforms are observed in the right  testicle.      RIGHT EPIDIDYMIS:  The right epididymal head measures 0.7 cm x 0.7 cm x 1.0 cm and is  within normal limits.      LEFT HEMISCROTUM:      LEFT TESTICLE:  The left testicle measures 2.1 cm x 1.3 cm x 3.2 cm. The left  testicle demonstrates a homogeneous echotexture and normal contour.  There is up to 3 mm of the left pampiniform plexus on Valsalva  consistent with varicocele. Normal vascularity and Doppler waveforms  are observed in the left testicle.       LEFT EPIDIDYMIS:  The left epididymal head measures 0.4 cm x 0.6 cm x 0.7 cm and is  within normal limits.      Impression: Small right-sided hydrocele and mild-to-moderate left sided  varicocele. Otherwise unremarkable scrotal/testicular ultrasound.      I personally reviewed the images/study and I agree with the findings  as stated by Resident Humberto Corbin MD.      MACRO:  None          Signed by: Toño Wilson 10/17/2024 10:06 AM  Dictation workstation:   [id number]  FL fluoro images no charge  These images are not reportable by radiology and will not be interpreted   by  Radiologists.    Physical Exam  Constitutional:       General: He is not in acute distress.     Appearance: Normal appearance.   Cardiovascular:      Rate and Rhythm: Normal rate and regular rhythm.      Pulses: Normal pulses.      Heart sounds: Normal heart sounds. No murmur heard.     No friction rub. No gallop.   Pulmonary:      Effort: Pulmonary effort is normal.      Breath sounds: No wheezing or rales. Rhonchi: R sided.  Musculoskeletal:         General: Tenderness (L knee) present.      Comments: Bandage over L knee, wound vac in place   Skin:     General: Skin is warm and dry.      Capillary Refill: Capillary refill takes less than 2 seconds.   Neurological:      General: No focal deficit present.      Mental Status: He is alert and oriented to person, place, and time.   Psychiatric:         Mood and Affect: Mood normal.         Behavior: Behavior normal.       Relevant Results  Scheduled medications  dboarkksc-belfgydg-ylesrzk ala, 1 tablet, oral, Daily  divalproex, 500 mg, oral, BID  enoxaparin, 40 mg, subcutaneous, q24h  gabapentin, 300 mg, oral, q8h NOMAN  OLANZapine, 5 mg, oral, BID  piperacillin-tazobactam, 4.5 g, intravenous, q6h  polyethylene glycol, 17 g, oral, Daily  sulfamethoxazole-trimethoprim, 1 tablet, oral, Daily  vancomycin, 1,000 mg, intravenous, q12h      Continuous medications     PRN medications  PRN  medications: acetaminophen, OLANZapine zydis, traZODone, vancomycin    Results for orders placed or performed during the hospital encounter of 10/14/24 (from the past 24 hours)   CBC and Auto Differential   Result Value Ref Range    WBC 9.2 4.4 - 11.3 x10*3/uL    nRBC 0.0 0.0 - 0.0 /100 WBCs    RBC 3.45 (L) 4.50 - 5.90 x10*6/uL    Hemoglobin 11.3 (L) 13.5 - 17.5 g/dL    Hematocrit 35.3 (L) 41.0 - 52.0 %     (H) 80 - 100 fL    MCH 32.8 26.0 - 34.0 pg    MCHC 32.0 32.0 - 36.0 g/dL    RDW 12.9 11.5 - 14.5 %    Platelets 261 150 - 450 x10*3/uL    Immature Granulocytes %, Automated 1.0 (H) 0.0 - 0.9 %    Immature Granulocytes Absolute, Automated 0.09 0.00 - 0.70 x10*3/uL   Magnesium   Result Value Ref Range    Magnesium 2.22 1.60 - 2.40 mg/dL   Renal Function Panel   Result Value Ref Range    Glucose 147 (H) 74 - 99 mg/dL    Sodium 136 136 - 145 mmol/L    Potassium 4.3 3.5 - 5.3 mmol/L    Chloride 104 98 - 107 mmol/L    Bicarbonate 25 21 - 32 mmol/L    Anion Gap 11 10 - 20 mmol/L    Urea Nitrogen 15 6 - 23 mg/dL    Creatinine 1.00 0.50 - 1.30 mg/dL    eGFR >90 >60 mL/min/1.73m*2    Calcium 9.2 8.6 - 10.6 mg/dL    Phosphorus 2.7 2.5 - 4.9 mg/dL    Albumin 2.9 (L) 3.4 - 5.0 g/dL   Manual Differential   Result Value Ref Range    Neutrophils %, Manual 89.7 40.0 - 80.0 %    Bands %, Manual 1.7 0.0 - 5.0 %    Lymphocytes %, Manual 2.6 13.0 - 44.0 %    Monocytes %, Manual 6.0 2.0 - 10.0 %    Eosinophils %, Manual 0.0 0.0 - 6.0 %    Basophils %, Manual 0.0 0.0 - 2.0 %    Seg Neutrophils Absolute, Manual 8.25 (H) 1.20 - 7.00 x10*3/uL    Bands Absolute, Manual 0.16 0.00 - 0.70 x10*3/uL    Lymphocytes Absolute, Manual 0.24 (L) 1.20 - 4.80 x10*3/uL    Monocytes Absolute, Manual 0.55 0.10 - 1.00 x10*3/uL    Eosinophils Absolute, Manual 0.00 0.00 - 0.70 x10*3/uL    Basophils Absolute, Manual 0.00 0.00 - 0.10 x10*3/uL    Total Cells Counted 116     Neutrophils Absolute, Manual 8.41 (H) 1.20 - 7.70 x10*3/uL    RBC Morphology See  Below     RBC Fragments Few    Vancomycin   Result Value Ref Range    Vancomycin 9.5 5.0 - 20.0 ug/mL   CBC and Auto Differential   Result Value Ref Range    WBC 9.2 4.4 - 11.3 x10*3/uL    nRBC 0.0 0.0 - 0.0 /100 WBCs    RBC 2.93 (L) 4.50 - 5.90 x10*6/uL    Hemoglobin 9.7 (L) 13.5 - 17.5 g/dL    Hematocrit 29.0 (L) 41.0 - 52.0 %    MCV 99 80 - 100 fL    MCH 33.1 26.0 - 34.0 pg    MCHC 33.4 32.0 - 36.0 g/dL    RDW 12.6 11.5 - 14.5 %    Platelets 274 150 - 450 x10*3/uL    Neutrophils % 82.8 40.0 - 80.0 %    Immature Granulocytes %, Automated 0.9 0.0 - 0.9 %    Lymphocytes % 8.5 13.0 - 44.0 %    Monocytes % 7.7 2.0 - 10.0 %    Eosinophils % 0.0 0.0 - 6.0 %    Basophils % 0.1 0.0 - 2.0 %    Neutrophils Absolute 7.61 1.20 - 7.70 x10*3/uL    Immature Granulocytes Absolute, Automated 0.08 0.00 - 0.70 x10*3/uL    Lymphocytes Absolute 0.78 (L) 1.20 - 4.80 x10*3/uL    Monocytes Absolute 0.71 0.10 - 1.00 x10*3/uL    Eosinophils Absolute 0.00 0.00 - 0.70 x10*3/uL    Basophils Absolute 0.01 0.00 - 0.10 x10*3/uL   Magnesium   Result Value Ref Range    Magnesium 1.92 1.60 - 2.40 mg/dL   Renal Function Panel   Result Value Ref Range    Glucose 146 (H) 74 - 99 mg/dL    Sodium 135 (L) 136 - 145 mmol/L    Potassium 4.1 3.5 - 5.3 mmol/L    Chloride 101 98 - 107 mmol/L    Bicarbonate 23 21 - 32 mmol/L    Anion Gap 15 10 - 20 mmol/L    Urea Nitrogen 16 6 - 23 mg/dL    Creatinine 1.10 0.50 - 1.30 mg/dL    eGFR 86 >60 mL/min/1.73m*2    Calcium 9.0 8.6 - 10.6 mg/dL    Phosphorus 2.4 (L) 2.5 - 4.9 mg/dL    Albumin 2.5 (L) 3.4 - 5.0 g/dL     US scrotum w doppler    Result Date: 10/17/2024  Interpreted By:  Toño Wilson,  and Dervishi Humberto STUDY: US SCROTUM WITH DOPPLER;  10/16/2024 8:48 pm   INDICATION: Signs/Symptoms:ulcer and swelling.     COMPARISON: None.   ACCESSION NUMBER(S): [id number]   ORDERING CLINICIAN: CHRIS KELLEY   TECHNIQUE: Multiple ultrasonographic images of scrotum and tested were obtained.   FINDINGS: RIGHT  HEMISCROTUM:     RIGHT TESTICLE:   There is a small right-sided hydrocele. The right testicle measures 2.9 cm x 1.8 cmx 1.9 cm. The right testicle demonstrates a homogeneous echotexture and normal contour. Normal vascularity and Doppler waveforms are observed in the right testicle.   RIGHT EPIDIDYMIS: The right epididymal head measures 0.7 cm x 0.7 cm x 1.0 cm and is within normal limits.   LEFT HEMISCROTUM:   LEFT TESTICLE: The left testicle measures 2.1 cm x 1.3 cm x 3.2 cm. The left testicle demonstrates a homogeneous echotexture and normal contour. There is up to 3 mm of the left pampiniform plexus on Valsalva consistent with varicocele. Normal vascularity and Doppler waveforms are observed in the left testicle.   LEFT EPIDIDYMIS: The left epididymal head measures 0.4 cm x 0.6 cm x 0.7 cm and is within normal limits.       Small right-sided hydrocele and mild-to-moderate left sided varicocele. Otherwise unremarkable scrotal/testicular ultrasound.   I personally reviewed the images/study and I agree with the findings as stated by Resident Humberto Corbin MD.   MACRO: None     Signed by: Toño Wilson 10/17/2024 10:06 AM Dictation workstation:   [id number]    FL fluoro images no charge    Result Date: 10/17/2024  These images are not reportable by radiology and will not be interpreted by  Radiologists.    CT femur left wo IV contrast    Result Date: 10/16/2024  Interpreted By:  Pascual Verma, STUDY: CT FEMUR LEFT WO IV CONTRAST; ;  10/16/2024 11:22 am   INDICATION: Signs/Symptoms:ballistic distal femur fracture 07/24 w/ new c/f septic arthritis, evaluate for complete fx healing for hardware removal.     COMPARISON: Plain film radiographs performed earlier the same day.   ACCESSION NUMBER(S): [id number]   ORDERING CLINICIAN: CHRIS KELLEY   TECHNIQUE: Multiple thin-section axial images of the left femur performed without contrast and reconstructed in the sagittal and coronal plane.   FINDINGS:   Again  note is made of ORIF of the previous distal femoral fracture with a long intramedullary manny and proximal and distal locking screws.   The left distal femoral fracture of the medial supracondylar femur mostly with some medial fragmentation and a small amount of medial metaphyseal cortical disruption. The fracture does not look across the entirety of the femur and there is some heterotopic ossification suggesting some healing but the fracture line still evident of the medial condyle.   New sizable joint effusion.   No bony destructive changes.   Hardware shows no evidence of complication with no periprosthetic lucency or malalignment.   No additional new fractures are seen.   No muscular attenuation changes.   Mild soft tissue edema surrounding the distal femur.   No other focal fluid collections.       Satisfactory appearance status post ORIF left distal femoral fracture.   New large joint effusion, nonspecific. Septic arthritis not excluded and arthrocentesis can be considered.     MACRO: None   Signed by: Pascual Verma 10/16/2024 12:49 PM Dictation workstation:   [id number]       Assessment/Plan      Assessment & Plan  Pneumonia due to infectious organism, unspecified laterality, unspecified part of lung    Bacterial pneumonia with HIV infection (Multi)    Bipolar 1 disorder (Multi)    PTSD (post-traumatic stress disorder)    Medication nonadherence due to psychosocial problem    Polysubstance abuse (Multi)    Hypokalemia    Diarrhea of presumed infectious origin    EDA (acute kidney injury) (CMS-[facility])    Pyogenic arthritis of left knee joint (Multi)    Infection following a procedure, unspecified, sequela    Romaine Rodgers is a 44 y.o. male with  PMH HIV (not on ART), bipolar, PTSD, polysubstance use disorder, recent L femoral intra-articular fx (s/p rIMN/ORIF 9/27) here with pneumonia, septic arthritis now s/p knee tap 10/16 and OR washout with ortho on 10/17.     Patient underwent removal of hardware from L  femur ORIF, irrigation and debridement of L knee, saucerization of the femur 10/17. NWB while drain in place, needs 40d DVT chemo ppx.     Continuing vanc, zosyn for now while awaiting cultures. Stool pathogen panel negative. Blood cultures NGTD. CD4 count resulted at 172, patient does meet criteria for AIDS. Will be imperative to take ART on dc, ongoing discussions with patient around importance of adherence to Biktarvy. Added bactrim for prophylaxis    SPEP/UPEP showing elevated IgG kappa, lambda. May still be attributable to underlying uncontrolled AIDS but will workup for plasma cell dyscrasias.     #HIV/AIDS  #RLL pneumonia  #diarrhea  :: CXR and CT chest w/o contrast showing RLL consolidation.   :: Flu/COVID/RSV, Hep C negative  -positive strep pneumo urine antigen  -completed 3d azithro for CAP  -vanc, zosyn for now, blood cultures NGTD  -ID consulted  -Biktarvy daily   -bactrim prophylaxis     #L intraarticular femoral fx s/p IMN/ORIF in July 2024  #L knee septic arthritis, s/p OR washout 10/17  :: ESR > 130, CRP 23, s/p bedside aspiration of cloudy fluid from knee  :: CT femur showing new joint effusion  :: s/p washout, hardware removal   -tylenol q6h prn  -gabapentin 300 q8h  -antibiotics above  -NWB while drain in place, managed by ortho     #PTSD  #Bipolar disorder  #polysubstance use disorder (EtOH, cannabis, tobacco)  #agitation, erratic behavior  -depakote  mg BID  -zyprexa 5 mg BID  -zyprexa 5 mg odt q8h prn     #protein gap  :: 8.9, noted on admission CMP, may be attributable to underlying HIV infection  :: SPEP/UPEP showing elevated alpha 2 globulin (1.6), elevated gamma globulin (3.0), elevated M-protein (1.1)  -checking IgG/A/M levels, light chain level  -likely outpatient heme referral    F: Replete prn, preferably PO  E: Replete prn  N: Regular diet  A: PIV x1    DVT ppx: Lovenox  GI ppx: PPI  Pain regimen: tylenol, gabapentin  Bowel regimen: miralax  Abx: vanc, zosyn  O2: RA  Code  Status: FULL CODE  Emergency contact: Bren Rodgers (mother) [phone]     Sergio Rangel MD  PGY-1, Internal Medicine-Pediatrics

## 2024-10-18 NOTE — PROGRESS NOTES
Romaine Rodgers is a 44 y.o. y.o. male on day 4 of admission presenting with Pneumonia due to infectious organism, unspecified laterality, unspecified part of lung [J18.9].     Subjective   Received update from medical team that patient has court hearing on 10/22 that he is concerned about missing due to his ongoing hospitalization.    Met with patient at the bedside who reports that his hearing is at [facility] [address]. Patient reports that the hearing is scheduled on 10/22 at 10am but unable to provide further information.    Call placed to [facility] Court of Common Pleas & was connected with the [facility] Prosecutor Office; LM for Sondra Moore requesting return call.    - Georgie GALAN, MA, LSW  Care Transitions   [facility] Secure Chat or [phone]

## 2024-10-19 VITALS
WEIGHT: 154 LBS | BODY MASS INDEX: 21.56 KG/M2 | RESPIRATION RATE: 16 BRPM | OXYGEN SATURATION: 100 % | HEART RATE: 99 BPM | TEMPERATURE: 98.6 F | SYSTOLIC BLOOD PRESSURE: 113 MMHG | HEIGHT: 71 IN | DIASTOLIC BLOOD PRESSURE: 76 MMHG

## 2024-10-19 LAB
ALBUMIN SERPL BCP-MCNC: 2.5 G/DL (ref 3.4–5)
ANION GAP SERPL CALC-SCNC: 13 MMOL/L (ref 10–20)
ATRIAL RATE: 103 BPM
BACTERIA FLD CULT: NORMAL
BACTERIA SPEC CULT: NORMAL
BACTERIA SPEC CULT: NORMAL
BASOPHILS # BLD AUTO: 0.01 X10*3/UL (ref 0–0.1)
BASOPHILS NFR BLD AUTO: 0.2 %
BUN SERPL-MCNC: 13 MG/DL (ref 6–23)
C TRACH RRNA SPEC QL NAA+PROBE: NEGATIVE
CALCIUM SERPL-MCNC: 8.7 MG/DL (ref 8.6–10.6)
CHLORIDE SERPL-SCNC: 101 MMOL/L (ref 98–107)
CO2 SERPL-SCNC: 29 MMOL/L (ref 21–32)
CREAT SERPL-MCNC: 0.98 MG/DL (ref 0.5–1.3)
EGFRCR SERPLBLD CKD-EPI 2021: >90 ML/MIN/1.73M*2
EOSINOPHIL # BLD AUTO: 0.02 X10*3/UL (ref 0–0.7)
EOSINOPHIL NFR BLD AUTO: 0.4 %
ERYTHROCYTE [DISTWIDTH] IN BLOOD BY AUTOMATED COUNT: 12.9 % (ref 11.5–14.5)
FOLATE SERPL-MCNC: 8.3 NG/ML
GLUCOSE SERPL-MCNC: 79 MG/DL (ref 74–99)
GRAM STN SPEC: NORMAL
HCT VFR BLD AUTO: 28.5 % (ref 41–52)
HGB BLD-MCNC: 9.2 G/DL (ref 13.5–17.5)
IMM GRANULOCYTES # BLD AUTO: 0.07 X10*3/UL (ref 0–0.7)
IMM GRANULOCYTES NFR BLD AUTO: 1.5 % (ref 0–0.9)
LYMPHOCYTES # BLD AUTO: 1.18 X10*3/UL (ref 1.2–4.8)
LYMPHOCYTES NFR BLD AUTO: 25.4 %
MAGNESIUM SERPL-MCNC: 1.77 MG/DL (ref 1.6–2.4)
MCH RBC QN AUTO: 32.7 PG (ref 26–34)
MCHC RBC AUTO-ENTMCNC: 32.3 G/DL (ref 32–36)
MCV RBC AUTO: 101 FL (ref 80–100)
MONOCYTES # BLD AUTO: 0.39 X10*3/UL (ref 0.1–1)
MONOCYTES NFR BLD AUTO: 8.4 %
N GONORRHOEA DNA SPEC QL PROBE+SIG AMP: NEGATIVE
NEUTROPHILS # BLD AUTO: 2.97 X10*3/UL (ref 1.2–7.7)
NEUTROPHILS NFR BLD AUTO: 64.1 %
NRBC BLD-RTO: 0 /100 WBCS (ref 0–0)
P AXIS: 72 DEGREES
P OFFSET: 204 MS
P ONSET: 150 MS
PHOSPHATE SERPL-MCNC: 2.8 MG/DL (ref 2.5–4.9)
PLATELET # BLD AUTO: 286 X10*3/UL (ref 150–450)
POTASSIUM SERPL-SCNC: 4 MMOL/L (ref 3.5–5.3)
PR INTERVAL: 142 MS
Q ONSET: 221 MS
QRS COUNT: 17 BEATS
QRS DURATION: 80 MS
QT INTERVAL: 328 MS
QTC CALCULATION(BAZETT): 429 MS
QTC FREDERICIA: 393 MS
R AXIS: 66 DEGREES
RBC # BLD AUTO: 2.81 X10*6/UL (ref 4.5–5.9)
SODIUM SERPL-SCNC: 139 MMOL/L (ref 136–145)
T AXIS: 62 DEGREES
T OFFSET: 385 MS
TSH SERPL-ACNC: 2.45 MIU/L (ref 0.44–3.98)
VENTRICULAR RATE: 103 BPM
VIT B12 SERPL-MCNC: 1291 PG/ML (ref 211–911)
WBC # BLD AUTO: 4.6 X10*3/UL (ref 4.4–11.3)

## 2024-10-19 PROCEDURE — 2500000001 HC RX 250 WO HCPCS SELF ADMINISTERED DRUGS (ALT 637 FOR MEDICARE OP)

## 2024-10-19 PROCEDURE — 1100000001 HC PRIVATE ROOM DAILY

## 2024-10-19 PROCEDURE — 2500000002 HC RX 250 W HCPCS SELF ADMINISTERED DRUGS (ALT 637 FOR MEDICARE OP, ALT 636 FOR OP/ED)

## 2024-10-19 PROCEDURE — 80069 RENAL FUNCTION PANEL: CPT

## 2024-10-19 PROCEDURE — 99232 SBSQ HOSP IP/OBS MODERATE 35: CPT

## 2024-10-19 PROCEDURE — 2500000004 HC RX 250 GENERAL PHARMACY W/ HCPCS (ALT 636 FOR OP/ED)

## 2024-10-19 PROCEDURE — 83735 ASSAY OF MAGNESIUM: CPT

## 2024-10-19 PROCEDURE — 36415 COLL VENOUS BLD VENIPUNCTURE: CPT

## 2024-10-19 PROCEDURE — 82607 VITAMIN B-12: CPT

## 2024-10-19 PROCEDURE — 84443 ASSAY THYROID STIM HORMONE: CPT

## 2024-10-19 PROCEDURE — 2500000004 HC RX 250 GENERAL PHARMACY W/ HCPCS (ALT 636 FOR OP/ED): Mod: JZ | Performed by: INTERNAL MEDICINE

## 2024-10-19 PROCEDURE — 85025 COMPLETE CBC W/AUTO DIFF WBC: CPT

## 2024-10-19 RX ORDER — MAGNESIUM SULFATE HEPTAHYDRATE 40 MG/ML
2 INJECTION, SOLUTION INTRAVENOUS ONCE
Status: COMPLETED | OUTPATIENT
Start: 2024-10-19 | End: 2024-10-19

## 2024-10-19 RX ORDER — CEFTRIAXONE 2 G/50ML
2 INJECTION, SOLUTION INTRAVENOUS EVERY 24 HOURS
Status: DISCONTINUED | OUTPATIENT
Start: 2024-10-19 | End: 2024-10-20 | Stop reason: HOSPADM

## 2024-10-19 RX ORDER — NYSTATIN 100000 [USP'U]/ML
5 SUSPENSION ORAL 3 TIMES DAILY
Status: DISCONTINUED | OUTPATIENT
Start: 2024-10-19 | End: 2024-10-20 | Stop reason: HOSPADM

## 2024-10-19 ASSESSMENT — COGNITIVE AND FUNCTIONAL STATUS - GENERAL
DAILY ACTIVITIY SCORE: 24
MOBILITY SCORE: 22
MOBILITY SCORE: 24
CLIMB 3 TO 5 STEPS WITH RAILING: A LOT
DAILY ACTIVITIY SCORE: 24

## 2024-10-19 ASSESSMENT — PAIN SCALES - GENERAL
PAINLEVEL_OUTOF10: 2
PAINLEVEL_OUTOF10: 7

## 2024-10-19 ASSESSMENT — PAIN SCALES - WONG BAKER: WONGBAKER_NUMERICALRESPONSE: HURTS LITTLE MORE

## 2024-10-19 ASSESSMENT — PAIN DESCRIPTION - ORIENTATION: ORIENTATION: LEFT

## 2024-10-19 ASSESSMENT — PAIN DESCRIPTION - LOCATION: LOCATION: KNEE

## 2024-10-19 NOTE — PROGRESS NOTES
"Orthopaedic Surgery Progress Note    S: Reports continued pain to his knee,  but somewhat improved. Denies N/T, SOB, CP, fever or chills.    O:  /65   Pulse 83   Temp 36.1 °C (97 °F)   Resp 18   Ht 1.803 m (5' 11\")   Wt 69.9 kg (154 lb)   SpO2 98%   BMI 21.48 kg/m²     Gen: arousable, NAD, appropriately conversational  Cardiac: RRR to peripheral palpation  Resp: nonlabored on RA  GI: soft, nondistended    MSK:  Left Lower Extremity:  -Surgical dressing c/d/I  -HV drain x1 w/ bloody OP  -Fires DF/PF/EHL/FHL  -SILT in saph/sural/SPN/DPN distributions  -Foot warm, well perfused  -Palpable DP pulse, brisk cap refill  -Compartments soft and compressible      A/P: 44 y.o. male s/p ISAK L femur, I&D L knee, OJCY of L femur on 10/17 with Dr. Chavez.      Plan:  - Weight bearing: WBAT LLE  - DVT PPx: SCDs, DVT chemoppx per primary, however recommend at least 4 weeks of DVT chemoprophylaxis  - Diet: OK for regular diet from orthopedic perspective  - Pain: Multimodal pain regimen per primary  - Antibiotics: IV abx per primary team  - Following intra-op cultures x2- NGTD  - Dressing: Mepilex remove POD7 (10/24/24)  - Drain: Hemovac drain x1, removed 10/19, no complication; tip intact  - PT/OT consult    We will follow peripherally while patient is in house. Pt should be WBAT LLE until first follow up appointment. Patient will require 6 weeks total of DVT prophylaxis from an orthopedic standpoint, if ok per primary team. Patient currently has mepilex on surgical site. Dressing should be removed POD7.. Please send home with Calcium/Vitamin D 500mg-400IU BID for 6 weeks. Patient should follow up w/ Dr. Chavez 3 weeks after surgery for post-operative appointment (patient may call [phone] to schedule). Please page with questions.     Diana Baraajs, DO   PGY-3 Orthopedic Surgery  [facility]  Available by Epic Message     While admitted, this patient will be followed by the Ortho Trauma Team. " Please contact below residents with any questions (available via Epic Chat).     First call: Nolberto Olivares, PGY-2  Second call: Bridgette Barajas, PGY-3

## 2024-10-19 NOTE — PROGRESS NOTES
4 eyes straight cath with primary RNFernando.   Vancomycin Dosing by Pharmacy- FOLLOW UP    Romaine Rodgers is a 44 y.o. year old male who Pharmacy has been consulted for vancomycin dosing for osteomyelitis/septic arthritis. Based on the patient's indication and renal status this patient is being dosed based on a goal AUC of 400-600.     Renal function is currently improving.    Current vancomycin dose: 1000 mg given every 12 hours    Estimated vancomycin AUC on current dose: 396 mg/L.hr     Visit Vitals  /65   Pulse 83   Temp 36.1 °C (97 °F)   Resp 18        Lab Results   Component Value Date    CREATININE 0.98 10/19/2024    CREATININE 1.10 10/18/2024    CREATININE 1.00 10/17/2024    CREATININE 1.04 10/16/2024        Patient weight is as follows:   Vitals:    10/14/24 0300   Weight: 69.9 kg (154 lb)       Cultures:  No results found for the encounter in last 14 days.       I/O last 3 completed shifts:  In: - (0 mL/kg)   Out: 1180 (16.9 mL/kg) [Urine:1150 (0.5 mL/kg/hr); Drains:30]  Weight: 69.9 kg   I/O during current shift:  I/O this shift:  In: -   Out: 30 [Drains:30]    Temp (24hrs), Av.5 °C (97.7 °F), Min:36.1 °C (97 °F), Max:36.9 °C (98.4 °F)      Assessment/Plan    Below goal AUC. Orders placed for new vancomcyin regimen of 1250 every 12 hours to begin at 1330.     This dosing regimen is predicted by [employer] to result in the following pharmacokinetic parameters:  Loading dose: N/A  Regimen: 1250 mg IV every 12 hours.  Start time: 22:37 on 10/19/2024  Exposure target: AUC24 (range)400-600 mg/L.hr   [id number]: 476 mg/L.hr  AUC24,ss: 494 mg/L.hr  Probability of AUC24 > 400: 89 %  Ctrough,ss: 13.5 mg/L  Probability of Ctrough,ss > 20: 10 %      The next level will be obtained on 10/20 at am labs. May be obtained sooner if clinically indicated.   Will continue to monitor renal function daily while on vancomycin and order serum creatinine at least every 48 hours if not already ordered.  Follow for continued vancomycin needs, clinical response, and  signs/symptoms of toxicity.       LIONEL RODRIGUES

## 2024-10-19 NOTE — CARE PLAN
Problem: Pain - Adult  Goal: Verbalizes/displays adequate comfort level or baseline comfort level  Outcome: Progressing     Problem: Safety - Adult  Goal: Free from fall injury  Outcome: Progressing     Problem: Discharge Planning  Goal: Discharge to home or other facility with appropriate resources  Outcome: Progressing   The patient's goals for the shift include      The clinical goals for the shift include Pt will remain safe and free from injury throughout shift.
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
## 2024-10-19 NOTE — PROGRESS NOTES
Romaine Rodgers is a 44 y.o. male on day 5 of admission presenting with Bacterial pneumonia with HIV infection (Multi).    Subjective   -NAOE  -no complaints this morning, wound vac removed and WBAT, did a little walking w/ crutch. Has pain but pain meds are helping    Objective     Last Recorded Vitals  /65   Pulse 83   Temp 36.1 °C (97 °F)   Resp 18   Wt 69.9 kg (154 lb)   SpO2 98%   Intake/Output last 3 Shifts:    Intake/Output Summary (Last 24 hours) at 10/19/2024 0733  Last data filed at 10/18/2024 1808  Gross per 24 hour   Intake --   Output 410 ml   Net -410 ml     Admission Weight  Weight: 69.9 kg (154 lb) (10/14/24 0300)    Daily Weight  10/14/24 : 69.9 kg (154 lb)    Image Results  US scrotum w doppler  Narrative: Interpreted By:  Toño Wilson,  and Shaquille Paul   STUDY:  US SCROTUM WITH DOPPLER;  10/16/2024 8:48 pm      INDICATION:  Signs/Symptoms:ulcer and swelling.          COMPARISON:  None.      ACCESSION NUMBER(S):  [id number]      ORDERING CLINICIAN:  CHRIS KELLEY      TECHNIQUE:  Multiple ultrasonographic images of scrotum and tested were obtained.      FINDINGS:  RIGHT HEMISCROTUM:          RIGHT TESTICLE:      There is a small right-sided hydrocele.  The right testicle measures 2.9 cm x 1.8 cmx 1.9 cm. The right  testicle demonstrates a homogeneous echotexture and normal contour.  Normal vascularity and Doppler waveforms are observed in the right  testicle.      RIGHT EPIDIDYMIS:  The right epididymal head measures 0.7 cm x 0.7 cm x 1.0 cm and is  within normal limits.      LEFT HEMISCROTUM:      LEFT TESTICLE:  The left testicle measures 2.1 cm x 1.3 cm x 3.2 cm. The left  testicle demonstrates a homogeneous echotexture and normal contour.  There is up to 3 mm of the left pampiniform plexus on Valsalva  consistent with varicocele. Normal vascularity and Doppler waveforms  are observed in the left testicle.      LEFT EPIDIDYMIS:  The left epididymal head measures 0.4 cm  x 0.6 cm x 0.7 cm and is  within normal limits.      Impression: Small right-sided hydrocele and mild-to-moderate left sided  varicocele. Otherwise unremarkable scrotal/testicular ultrasound.      I personally reviewed the images/study and I agree with the findings  as stated by Resident Humberto Corbin MD.      MACRO:  None          Signed by: Toño Wilson 10/17/2024 10:06 AM  Dictation workstation:   [id number]  FL fluoro images no charge  These images are not reportable by radiology and will not be interpreted   by  Radiologists.    Physical Exam  Constitutional:       General: He is not in acute distress.     Appearance: Normal appearance.   Cardiovascular:      Rate and Rhythm: Normal rate and regular rhythm.      Pulses: Normal pulses.      Heart sounds: Normal heart sounds. No murmur heard.     No friction rub. No gallop.   Pulmonary:      Effort: Pulmonary effort is normal.      Breath sounds: No wheezing or rales. Rhonchi: R sided.  Musculoskeletal:         General: Tenderness (L knee) present.      Comments: Bandage over L knee   Skin:     General: Skin is warm and dry.      Capillary Refill: Capillary refill takes less than 2 seconds.   Neurological:      General: No focal deficit present.      Mental Status: He is alert and oriented to person, place, and time.   Psychiatric:         Mood and Affect: Mood normal.         Behavior: Behavior normal.       Relevant Results  Scheduled medications  acetaminophen, 650 mg, oral, q6h  alzudpedw-uwotfmtb-tumcfji ala, 1 tablet, oral, Daily  cefTRIAXone, 2 g, intravenous, q24h  divalproex, 500 mg, oral, BID  enoxaparin, 40 mg, subcutaneous, q24h  gabapentin, 300 mg, oral, q8h NOMAN  magnesium sulfate, 2 g, intravenous, Once  nystatin, 5 mL, Swish & Spit, TID  OLANZapine, 5 mg, oral, BID  [Held by provider] piperacillin-tazobactam, 4.5 g, intravenous, q6h  polyethylene glycol, 17 g, oral, Daily  sulfamethoxazole-trimethoprim, 1 tablet, oral,  Daily  vancomycin, 1,000 mg, intravenous, q12h      Continuous medications     PRN medications  PRN medications: OLANZapine zydis, oxyCODONE, traZODone, vancomycin    Results for orders placed or performed during the hospital encounter of 10/14/24 (from the past 24 hours)   CBC and Auto Differential   Result Value Ref Range    WBC 4.6 4.4 - 11.3 x10*3/uL    nRBC 0.0 0.0 - 0.0 /100 WBCs    RBC 2.81 (L) 4.50 - 5.90 x10*6/uL    Hemoglobin 9.2 (L) 13.5 - 17.5 g/dL    Hematocrit 28.5 (L) 41.0 - 52.0 %     (H) 80 - 100 fL    MCH 32.7 26.0 - 34.0 pg    MCHC 32.3 32.0 - 36.0 g/dL    RDW 12.9 11.5 - 14.5 %    Platelets 286 150 - 450 x10*3/uL    Neutrophils % 64.1 40.0 - 80.0 %    Immature Granulocytes %, Automated 1.5 (H) 0.0 - 0.9 %    Lymphocytes % 25.4 13.0 - 44.0 %    Monocytes % 8.4 2.0 - 10.0 %    Eosinophils % 0.4 0.0 - 6.0 %    Basophils % 0.2 0.0 - 2.0 %    Neutrophils Absolute 2.97 1.20 - 7.70 x10*3/uL    Immature Granulocytes Absolute, Automated 0.07 0.00 - 0.70 x10*3/uL    Lymphocytes Absolute 1.18 (L) 1.20 - 4.80 x10*3/uL    Monocytes Absolute 0.39 0.10 - 1.00 x10*3/uL    Eosinophils Absolute 0.02 0.00 - 0.70 x10*3/uL    Basophils Absolute 0.01 0.00 - 0.10 x10*3/uL   Magnesium   Result Value Ref Range    Magnesium 1.77 1.60 - 2.40 mg/dL   Renal Function Panel   Result Value Ref Range    Glucose 79 74 - 99 mg/dL    Sodium 139 136 - 145 mmol/L    Potassium 4.0 3.5 - 5.3 mmol/L    Chloride 101 98 - 107 mmol/L    Bicarbonate 29 21 - 32 mmol/L    Anion Gap 13 10 - 20 mmol/L    Urea Nitrogen 13 6 - 23 mg/dL    Creatinine 0.98 0.50 - 1.30 mg/dL    eGFR >90 >60 mL/min/1.73m*2    Calcium 8.7 8.6 - 10.6 mg/dL    Phosphorus 2.8 2.5 - 4.9 mg/dL    Albumin 2.5 (L) 3.4 - 5.0 g/dL     FL fluoro images no charge    Result Date: 10/17/2024  These images are not reportable by radiology and will not be interpreted by  Radiologists.       Assessment/Plan      Assessment & Plan  Pneumonia due to infectious organism,  unspecified laterality, unspecified part of lung    Bacterial pneumonia with HIV infection (Multi)    Bipolar 1 disorder (Multi)    PTSD (post-traumatic stress disorder)    Medication nonadherence due to psychosocial problem    Polysubstance abuse (Multi)    Hypokalemia    Diarrhea of presumed infectious origin    EDA (acute kidney injury) (CMS-HCC)    Pyogenic arthritis of left knee joint (Multi)    Infection following a procedure, unspecified, sequela    Romaine Rodgers is a 44 y.o. male with  PMH HIV (not on ART), bipolar, PTSD, polysubstance use disorder, recent L femoral intra-articular fx (s/p rIMN/ORIF 9/27) here with pneumonia, septic arthritis now s/p knee tap 10/16 and OR washout with ortho on 10/17. Patient underwent removal of hardware from L femur ORIF, irrigation and debridement of L knee, saucerization of the femur 10/17. NWB while drain in place, needs 40d DVT chemo ppx.     Continuing vanc, zosyn for now while awaiting cultures. Stool pathogen panel negative. Blood cultures NGTD. CD4 count resulted at 172, patient does meet criteria for AIDS. Will be imperative to take ART on dc, ongoing discussions with patient around importance of adherence to Biktarvy. Added bactrim for prophylaxis    SPEP/UPEP showing elevated IgG kappa, lambda. May still be attributable to underlying uncontrolled AIDS but will workup for plasma cell dyscrasias.     10/19 Updates:   - Ortho following for knee, appreciate recs (wound vac removed today, WBAT)   - Elevated mcv --> folate, b12 and tsh  - VZV/HSV swab of scrotal wound per ID recs   - nystatin for oral thrush   - Follow up cultures     #HIV/AIDS  #RLL pneumonia  #diarrhea  :: CXR and CT chest w/o contrast showing RLL consolidation.   :: Flu/COVID/RSV, Hep C negative  -positive strep pneumo urine antigen  -completed 3d azithro for CAP  - Vanc (10/16-*  - CTX (10/19-*  - Zosyn (10/16-10/18)  - Cx NGTD  -ID consulted  -Biktarvy daily   -bactrim prophylaxis    #Oral  thrush  -Nystatin swish and spit (10/19-*     #L intraarticular femoral fx s/p IMN/ORIF in July 2024  #L knee septic arthritis, s/p OR washout 10/17  :: ESR > 130, CRP 23, s/p bedside aspiration of cloudy fluid from knee  :: CT femur showing new joint effusion  :: s/p washout, hardware removal   -tylenol q6h prn  -gabapentin 300 q8h  -antibiotics above  -NWB while drain in place, managed by ortho     #PTSD  #Bipolar disorder  #polysubstance use disorder (EtOH, cannabis, tobacco)  #agitation, erratic behavior  -depakote  mg BID  -zyprexa 5 mg BID  -zyprexa 5 mg odt q8h prn     #protein gap  :: 8.9, noted on admission CMP, may be attributable to underlying HIV infection  :: SPEP/UPEP showing elevated alpha 2 globulin (1.6), elevated gamma globulin (3.0), elevated M-protein (1.1)  -checking IgG/A/M levels, light chain level  -likely outpatient heme referral    F: Replete prn, preferably PO  E: Replete prn  N: Regular diet  A: PIV x1    DVT ppx: Lovenox  GI ppx: PPI  Pain regimen: tylenol, gabapentin  Bowel regimen: miralax  Abx: vanc, zosyn  O2: RA  Code Status: FULL CODE  Emergency contact: Bren Rodgers (mother) [phone]     Kaitlin Paredes MD  PGY-2, Internal Medicine-Pediatrics

## 2024-10-19 NOTE — CARE PLAN
Problem: Pain - Adult  Goal: Verbalizes/displays adequate comfort level or baseline comfort level  Outcome: Progressing     Problem: Safety - Adult  Goal: Free from fall injury  Outcome: Progressing     Problem: Skin  Goal: Decreased wound size/increased tissue granulation at next dressing change  Outcome: Progressing  Goal: Participates in plan/prevention/treatment measures  Outcome: Progressing  Goal: Promote skin healing  Outcome: Progressing   The patient's goals for the shift include      The clinical goals for the shift include Pt will be safe and free from injury throughout shift

## 2024-10-20 PROBLEM — B37.0 ORAL THRUSH: Status: ACTIVE | Noted: 2024-10-20

## 2024-10-20 PROBLEM — Z21 HIV (HUMAN IMMUNODEFICIENCY VIRUS INFECTION): Status: ACTIVE | Noted: 2024-10-20

## 2024-10-20 LAB
ALBUMIN SERPL BCP-MCNC: 2.5 G/DL (ref 3.4–5)
ANION GAP SERPL CALC-SCNC: 10 MMOL/L (ref 10–20)
BACTERIA BLD CULT: NORMAL
BACTERIA BLD CULT: NORMAL
BASOPHILS # BLD AUTO: 0.01 X10*3/UL (ref 0–0.1)
BASOPHILS NFR BLD AUTO: 0.2 %
BUN SERPL-MCNC: 10 MG/DL (ref 6–23)
CALCIUM SERPL-MCNC: 9 MG/DL (ref 8.6–10.6)
CHLORIDE SERPL-SCNC: 103 MMOL/L (ref 98–107)
CO2 SERPL-SCNC: 29 MMOL/L (ref 21–32)
CREAT SERPL-MCNC: 1.08 MG/DL (ref 0.5–1.3)
EGFRCR SERPLBLD CKD-EPI 2021: 88 ML/MIN/1.73M*2
EOSINOPHIL # BLD AUTO: 0.04 X10*3/UL (ref 0–0.7)
EOSINOPHIL NFR BLD AUTO: 0.7 %
ERYTHROCYTE [DISTWIDTH] IN BLOOD BY AUTOMATED COUNT: 12.7 % (ref 11.5–14.5)
GLUCOSE SERPL-MCNC: 84 MG/DL (ref 74–99)
HCT VFR BLD AUTO: 27.7 % (ref 41–52)
HGB BLD-MCNC: 9.1 G/DL (ref 13.5–17.5)
IMM GRANULOCYTES # BLD AUTO: 0.05 X10*3/UL (ref 0–0.7)
IMM GRANULOCYTES NFR BLD AUTO: 0.8 % (ref 0–0.9)
KAPPA LC SERPL-MCNC: 10.81 MG/DL (ref 0.33–1.94)
KAPPA LC/LAMBDA SER: 1.29 {RATIO} (ref 0.26–1.65)
LAMBDA LC SERPL-MCNC: 8.4 MG/DL (ref 0.57–2.63)
LYMPHOCYTES # BLD AUTO: 0.96 X10*3/UL (ref 1.2–4.8)
LYMPHOCYTES NFR BLD AUTO: 16 %
MAGNESIUM SERPL-MCNC: 1.77 MG/DL (ref 1.6–2.4)
MCH RBC QN AUTO: 33.6 PG (ref 26–34)
MCHC RBC AUTO-ENTMCNC: 32.9 G/DL (ref 32–36)
MCV RBC AUTO: 102 FL (ref 80–100)
MONOCYTES # BLD AUTO: 0.43 X10*3/UL (ref 0.1–1)
MONOCYTES NFR BLD AUTO: 7.2 %
NEUTROPHILS # BLD AUTO: 4.51 X10*3/UL (ref 1.2–7.7)
NEUTROPHILS NFR BLD AUTO: 75.1 %
NRBC BLD-RTO: 0 /100 WBCS (ref 0–0)
O+P STL MICRO: NEGATIVE
PHOSPHATE SERPL-MCNC: 2.5 MG/DL (ref 2.5–4.9)
PLATELET # BLD AUTO: 354 X10*3/UL (ref 150–450)
POTASSIUM SERPL-SCNC: 3.9 MMOL/L (ref 3.5–5.3)
RBC # BLD AUTO: 2.71 X10*6/UL (ref 4.5–5.9)
SODIUM SERPL-SCNC: 138 MMOL/L (ref 136–145)
VANCOMYCIN SERPL-MCNC: 14.1 UG/ML (ref 5–20)
WBC # BLD AUTO: 6 X10*3/UL (ref 4.4–11.3)

## 2024-10-20 PROCEDURE — 2500000002 HC RX 250 W HCPCS SELF ADMINISTERED DRUGS (ALT 637 FOR MEDICARE OP, ALT 636 FOR OP/ED)

## 2024-10-20 PROCEDURE — 2500000004 HC RX 250 GENERAL PHARMACY W/ HCPCS (ALT 636 FOR OP/ED): Mod: JZ | Performed by: INTERNAL MEDICINE

## 2024-10-20 PROCEDURE — 80202 ASSAY OF VANCOMYCIN: CPT

## 2024-10-20 PROCEDURE — 83735 ASSAY OF MAGNESIUM: CPT

## 2024-10-20 PROCEDURE — 2500000001 HC RX 250 WO HCPCS SELF ADMINISTERED DRUGS (ALT 637 FOR MEDICARE OP)

## 2024-10-20 PROCEDURE — 80069 RENAL FUNCTION PANEL: CPT

## 2024-10-20 PROCEDURE — 85025 COMPLETE CBC W/AUTO DIFF WBC: CPT

## 2024-10-20 PROCEDURE — 36415 COLL VENOUS BLD VENIPUNCTURE: CPT

## 2024-10-20 PROCEDURE — 2500000004 HC RX 250 GENERAL PHARMACY W/ HCPCS (ALT 636 FOR OP/ED)

## 2024-10-20 PROCEDURE — 99232 SBSQ HOSP IP/OBS MODERATE 35: CPT

## 2024-10-20 RX ORDER — PNV NO.95/FERROUS FUM/FOLIC AC 28MG-0.8MG
1 TABLET ORAL DAILY
Qty: 42 TABLET | Refills: 0 | Status: ON HOLD | OUTPATIENT
Start: 2024-10-20 | End: 2024-10-25

## 2024-10-20 RX ORDER — NYSTATIN 100000 [USP'U]/ML
5 SUSPENSION ORAL 3 TIMES DAILY
Qty: 80 ML | Refills: 0 | Status: SHIPPED | OUTPATIENT
Start: 2024-10-20 | End: 2024-10-31

## 2024-10-20 RX ORDER — SULFAMETHOXAZOLE AND TRIMETHOPRIM 800; 160 MG/1; MG/1
1 TABLET ORAL DAILY
Qty: 30 TABLET | Refills: 0 | Status: ON HOLD | OUTPATIENT
Start: 2024-10-21 | End: 2024-10-25

## 2024-10-20 ASSESSMENT — COGNITIVE AND FUNCTIONAL STATUS - GENERAL
MOBILITY SCORE: 22
CLIMB 3 TO 5 STEPS WITH RAILING: A LITTLE
WALKING IN HOSPITAL ROOM: A LITTLE
DAILY ACTIVITIY SCORE: 24

## 2024-10-20 ASSESSMENT — PAIN SCALES - GENERAL: PAINLEVEL_OUTOF10: 0 - NO PAIN

## 2024-10-20 ASSESSMENT — PAIN - FUNCTIONAL ASSESSMENT: PAIN_FUNCTIONAL_ASSESSMENT: WONG-BAKER FACES

## 2024-10-20 NOTE — CARE PLAN
The patient's goals for the shift include Patient will remain safe and free from injury    The clinical goals for the shift include Patient will remain HDS throughout shift.    Problem: Pain - Adult  Goal: Verbalizes/displays adequate comfort level or baseline comfort level  Outcome: Progressing     Problem: Safety - Adult  Goal: Free from fall injury  Outcome: Progressing     Problem: Discharge Planning  Goal: Discharge to home or other facility with appropriate resources  Outcome: Progressing     Problem: Chronic Conditions and Co-morbidities  Goal: Patient's chronic conditions and co-morbidity symptoms are monitored and maintained or improved  Outcome: Progressing     Problem: Skin  Goal: Decreased wound size/increased tissue granulation at next dressing change  Outcome: Progressing  Goal: Participates in plan/prevention/treatment measures  Outcome: Progressing  Goal: Prevent/manage excess moisture  Outcome: Progressing  Goal: Prevent/minimize sheer/friction injuries  Outcome: Progressing  Goal: Promote/optimize nutrition  Outcome: Progressing  Goal: Promote skin healing  Outcome: Progressing

## 2024-10-20 NOTE — CARE PLAN
Problem: Pain - Adult  Goal: Verbalizes/displays adequate comfort level or baseline comfort level  Outcome: Progressing     Problem: Safety - Adult  Goal: Free from fall injury  Outcome: Progressing     Problem: Discharge Planning  Goal: Discharge to home or other facility with appropriate resources  Outcome: Progressing     Problem: Chronic Conditions and Co-morbidities  Goal: Patient's chronic conditions and co-morbidity symptoms are monitored and maintained or improved  Outcome: Progressing     Problem: Skin  Goal: Decreased wound size/increased tissue granulation at next dressing change  Outcome: Progressing  Goal: Participates in plan/prevention/treatment measures  Outcome: Progressing  Goal: Prevent/manage excess moisture  Outcome: Progressing  Goal: Prevent/minimize sheer/friction injuries  Outcome: Progressing  Goal: Promote/optimize nutrition  Outcome: Progressing  Goal: Promote skin healing  Outcome: Progressing   The patient's goals for the shift include Patient will remain safe and free from injury    The clinical goals for the shift include Patient will remain HDS throughout shift.

## 2024-10-20 NOTE — PROGRESS NOTES
Romaine Rodgers is a 44 y.o. male on day 6 of admission presenting with Bacterial pneumonia with HIV infection (Multi).    Subjective   -NAOE  -reports feeling unchanged, no current complaints.     Objective     Last Recorded Vitals  /76   Pulse 99   Temp 37 °C (98.6 °F)   Resp 16   Wt 69.9 kg (154 lb)   SpO2 100%   Intake/Output last 3 Shifts:    Intake/Output Summary (Last 24 hours) at 10/20/2024 0658  Last data filed at 10/19/2024 0941  Gross per 24 hour   Intake --   Output 30 ml   Net -30 ml     Admission Weight  Weight: 69.9 kg (154 lb) (10/14/24 0300)    Daily Weight  10/14/24 : 69.9 kg (154 lb)    Image Results  ECG 12 Lead  Sinus tachycardia  Minimal voltage criteria for LVH, may be normal variant ( Sokolow-Weinberg )  Borderline ECG  No previous ECGs available    Physical Exam  Constitutional:       General: He is not in acute distress.     Appearance: Normal appearance.   Cardiovascular:      Rate and Rhythm: Normal rate and regular rhythm.      Pulses: Normal pulses.      Heart sounds: Normal heart sounds. No murmur heard.     No friction rub. No gallop.   Pulmonary:      Effort: Pulmonary effort is normal.      Breath sounds: No wheezing or rales. Rhonchi: R sided.  Musculoskeletal:         General: Tenderness (L knee) present.      Comments: Bandage over L knee   Skin:     General: Skin is warm and dry.      Capillary Refill: Capillary refill takes less than 2 seconds.   Neurological:      General: No focal deficit present.      Mental Status: He is alert and oriented to person, place, and time.   Psychiatric:         Mood and Affect: Mood normal.         Behavior: Behavior normal.       Relevant Results  Scheduled medications  acetaminophen, 650 mg, oral, q6h  khvxkyrmz-dxnebtvu-wtashfd ala, 1 tablet, oral, Daily  cefTRIAXone, 2 g, intravenous, q24h  divalproex, 500 mg, oral, BID  enoxaparin, 40 mg, subcutaneous, q24h  gabapentin, 300 mg, oral, q8h NOMAN  nystatin, 5 mL, Swish & Spit,  TID  OLANZapine, 5 mg, oral, BID  [Held by provider] piperacillin-tazobactam, 4.5 g, intravenous, q6h  polyethylene glycol, 17 g, oral, Daily  sulfamethoxazole-trimethoprim, 1 tablet, oral, Daily  vancomycin, 1,250 mg, intravenous, q12h      Continuous medications     PRN medications  PRN medications: OLANZapine zydis, oxyCODONE, traZODone, vancomycin    Results for orders placed or performed during the hospital encounter of 10/14/24 (from the past 24 hours)   Vitamin B12   Result Value Ref Range    Vitamin B12 1,291 (H) 211 - 911 pg/mL     No results found.      Assessment/Plan      Assessment & Plan  Pneumonia due to infectious organism, unspecified laterality, unspecified part of lung    Bacterial pneumonia with HIV infection (Multi)    Bipolar 1 disorder (Multi)    PTSD (post-traumatic stress disorder)    Medication nonadherence due to psychosocial problem    Polysubstance abuse (Multi)    Hypokalemia    Diarrhea of presumed infectious origin    EDA (acute kidney injury) (CMS-[facility])    Pyogenic arthritis of left knee joint (Multi)    Infection following a procedure, unspecified, sequela    Romaine Rodgers is a 44 y.o. male with  PMH HIV (not on ART), bipolar, PTSD, polysubstance use disorder, recent L femoral intra-articular fx (s/p rIMN/ORIF 9/27) here with pneumonia, septic arthritis now s/p knee tap 10/16 and OR washout with ortho on 10/17. Patient underwent removal of hardware from L femur ORIF, irrigation and debridement of L knee, saucerization of the femur 10/17. needs 40d DVT chemo ppx.     Continuing vanc, CTX for now while awaiting cultures. Stool pathogen panel negative. Blood cultures NGTD. CD4 count resulted at 172, patient does meet criteria for AIDS. Will be imperative to take ART on dc, ongoing discussions with patient around importance of adherence to Biktarvy. Added bactrim for prophylaxis    SPEP/UPEP showing elevated IgG kappa, lambda. May still be attributable to underlying uncontrolled AIDS  but will workup for plasma cell dyscrasias.     10/20 Updates:   - Drain removed yesterday, pt WBAT  - Follow up cultures and HSV/VZV swab  - Final abx plan pending cultures   - cont nystatin for oral thrush     #HIV/AIDS  #RLL pneumonia  #diarrhea  :: CXR and CT chest w/o contrast showing RLL consolidation.   :: Flu/COVID/RSV, Hep C negative  -positive strep pneumo urine antigen  -completed 3d azithro for CAP  - Vanc (10/16-*  - CTX (10/19-*  - Zosyn (10/16-10/18)  - Cx NGTD  -ID consulted  -Biktarvy daily   -bactrim prophylaxis    #Oral thrush  -Nystatin swish and spit for 7 days (10/19-10/25)  -Per ID fluconazole 100mg daily x 7d for systemic therapy if not improving      #L intraarticular femoral fx s/p IMN/ORIF in July 2024  #L knee septic arthritis, s/p OR washout 10/17  :: ESR > 130, CRP 23, s/p bedside aspiration of cloudy fluid from knee  :: CT femur showing new joint effusion  :: s/p washout, hardware removal   -tylenol q6h prn  -gabapentin 300 q8h  -oxy 5mg Q6 PRN severe pain  -antibiotics above  -NWB while drain in place, managed by ortho     #PTSD  #Bipolar disorder  #polysubstance use disorder (EtOH, cannabis, tobacco)  #agitation, erratic behavior  -depakote  mg BID  -zyprexa 5 mg BID  -zyprexa 5 mg odt q8h prn     #protein gap  :: 8.9, noted on admission CMP, may be attributable to underlying HIV infection  :: SPEP/UPEP showing elevated alpha 2 globulin (1.6), elevated gamma globulin (3.0), elevated M-protein (1.1)  -checking IgG/A/M levels, light chain level  -likely outpatient heme referral    F: Replete prn, preferably PO  E: Replete prn  N: Regular diet  A: PIV x1    DVT ppx: Lovenox  GI ppx: PPI  Pain regimen: tylenol, gabapentin, oxy 5mg  Bowel regimen: miralax  Abx: vanc, zosyn  O2: RA  Code Status: FULL CODE  Emergency contact: Bren Rodgers (mother) [phone]     Kaitlin Paredes MD  PGY-2, Internal Medicine-Pediatrics

## 2024-10-20 NOTE — SIGNIFICANT EVENT
Medical team was alerted around 4:08 pm by Kristin Stephens RN that patient left AMA via elevator. Nursing team was able to get PIV removed prior to patient leaving but could not convince him to stay for discussion with medical team about risks and benefits and to sign AMA slip.     I called the patient on his listed cell phone number twice, and it went to voicemail. I left a voicemail. [phone]    I called the patient's NOK which is his mother Bren Rodgers [phone]. She was unaware that the patient left and is very concerned about his care. She mentioned talking with him earlier to let him know she would take care of his cat and come to visit him in the hospital tomorrow. She has not been to his apartment but will try to get a ride there to see if he is home and discuss him finishing up care in the hospital.     I discussed risks of leaving the hospital while actively receiving treatment with the pt's mother and she understood these risks and agrees with our medical concerns.     This was discussed with attending and senior resident.

## 2024-10-20 NOTE — PROGRESS NOTES
Vancomycin Dosing by Pharmacy- FOLLOW UP    Romaine Rodgers is a 44 y.o. year old male who Pharmacy has been consulted for vancomycin dosing for osteomyelitis/septic arthritis. Based on the patient's indication and renal status this patient is being dosed based on a goal AUC of 400-600.     Renal function is currently stable.    Current vancomycin dose: 1250 mg given every 12 hours    Estimated vancomycin AUC on current dose: 535 mg/L.hr     Visit Vitals  /76   Pulse 99   Temp 37 °C (98.6 °F)   Resp 16        Lab Results   Component Value Date    CREATININE 1.08 10/20/2024    CREATININE 0.98 10/19/2024    CREATININE 1.10 10/18/2024    CREATININE 1.00 10/17/2024        Patient weight is as follows:   Vitals:    10/14/24 0300   Weight: 69.9 kg (154 lb)       Cultures:  No results found for the encounter in last 14 days.       I/O last 3 completed shifts:  In: - (0 mL/kg)   Out: 30 (0.4 mL/kg) [Drains:30]  Weight: 69.9 kg   I/O during current shift:  I/O this shift:  In: 1647.1 [I.V.:47.1; IV Piggyback:1600]  Out: -     Temp (24hrs), Av.1 °C (98.7 °F), Min:37 °C (98.6 °F), Max:37.1 °C (98.8 °F)      Assessment/Plan    Within goal AUC range. Continue current vancomycin regimen.    This dosing regimen is predicted by [employer] to result in the following pharmacokinetic parameters:  Loading dose: N/A  Regimen: 1250 mg IV every 12 hours.  Start time: 12:39 on 10/20/2024  Exposure target: AUC24 (range)400-600 mg/L.hr   GJP41-26: 523 mg/L.hr  AUC24,ss: 535 mg/L.hr  Probability of AUC24 > 400: 98 %  Ctrough,ss: 15 mg/L  Probability of Ctrough,ss > 20: 4 %  The next level will be obtained on 10/23 at am labs. May be obtained sooner if clinically indicated.   Will continue to monitor renal function daily while on vancomycin and order serum creatinine at least every 48 hours if not already ordered.  Follow for continued vancomycin needs, clinical response, and signs/symptoms of toxicity.       LIONEL RODRIGUES

## 2024-10-21 LAB
FUNGUS SPEC CULT: NORMAL
FUNGUS SPEC FUNGUS STN: NORMAL

## 2024-10-21 PROCEDURE — RXMED WILLOW AMBULATORY MEDICATION CHARGE

## 2024-10-22 ENCOUNTER — APPOINTMENT (OUTPATIENT)
Dept: RADIOLOGY | Facility: HOSPITAL | Age: 44
End: 2024-10-22
Payer: COMMERCIAL

## 2024-10-22 ENCOUNTER — HOSPITAL ENCOUNTER (INPATIENT)
Facility: HOSPITAL | Age: 44
LOS: 2 days | Discharge: HOME | End: 2024-10-25
Admitting: INTERNAL MEDICINE
Payer: COMMERCIAL

## 2024-10-22 ENCOUNTER — CLINICAL SUPPORT (OUTPATIENT)
Dept: EMERGENCY MEDICINE | Facility: HOSPITAL | Age: 44
End: 2024-10-22
Payer: COMMERCIAL

## 2024-10-22 DIAGNOSIS — Z48.89 ENCOUNTER FOR POSTOPERATIVE WOUND CHECK: Primary | ICD-10-CM

## 2024-10-22 DIAGNOSIS — S81.032A GUNSHOT WOUND OF LEFT KNEE, INITIAL ENCOUNTER: ICD-10-CM

## 2024-10-22 DIAGNOSIS — Z21 ASYMPTOMATIC HIV INFECTION, WITH NO HISTORY OF HIV-RELATED ILLNESS (MULTI): ICD-10-CM

## 2024-10-22 DIAGNOSIS — M25.462 KNEE EFFUSION, LEFT: ICD-10-CM

## 2024-10-22 DIAGNOSIS — Z21 HIV INFECTION, UNSPECIFIED SYMPTOM STATUS: ICD-10-CM

## 2024-10-22 DIAGNOSIS — D75.839 THROMBOCYTOSIS: ICD-10-CM

## 2024-10-22 DIAGNOSIS — M00.9 PYOGENIC ARTHRITIS OF LEFT KNEE JOINT, DUE TO UNSPECIFIED ORGANISM (MULTI): ICD-10-CM

## 2024-10-22 LAB
ANION GAP BLDV CALCULATED.4IONS-SCNC: 7 MMOL/L (ref 10–25)
ATRIAL RATE: 103 BPM
ATRIAL RATE: 103 BPM
BASE EXCESS BLDV CALC-SCNC: 5.3 MMOL/L (ref -2–3)
BODY TEMPERATURE: 37 DEGREES CELSIUS
CA-I BLDV-SCNC: 1.26 MMOL/L (ref 1.1–1.33)
CHLORIDE BLDV-SCNC: 107 MMOL/L (ref 98–107)
GLUCOSE BLDV-MCNC: 91 MG/DL (ref 74–99)
HCO3 BLDV-SCNC: 29.9 MMOL/L (ref 22–26)
HCT VFR BLD EST: 28 % (ref 41–52)
HGB BLDV-MCNC: 9.4 G/DL (ref 13.5–17.5)
INHALED O2 CONCENTRATION: 21 %
LACTATE BLDV-SCNC: 1.1 MMOL/L (ref 0.4–2)
OXYHGB MFR BLDV: 79.5 % (ref 45–75)
P AXIS: 72 DEGREES
P AXIS: 74 DEGREES
P OFFSET: 204 MS
P OFFSET: 206 MS
P ONSET: 150 MS
P ONSET: 152 MS
PCO2 BLDV: 43 MM HG (ref 41–51)
PH BLDV: 7.45 PH (ref 7.33–7.43)
PO2 BLDV: 53 MM HG (ref 35–45)
POTASSIUM BLDV-SCNC: 4 MMOL/L (ref 3.5–5.3)
PR INTERVAL: 138 MS
PR INTERVAL: 142 MS
Q ONSET: 221 MS
Q ONSET: 221 MS
QRS COUNT: 17 BEATS
QRS COUNT: 17 BEATS
QRS DURATION: 80 MS
QRS DURATION: 82 MS
QT INTERVAL: 328 MS
QT INTERVAL: 334 MS
QTC CALCULATION(BAZETT): 429 MS
QTC CALCULATION(BAZETT): 437 MS
QTC FREDERICIA: 393 MS
QTC FREDERICIA: 400 MS
R AXIS: 66 DEGREES
R AXIS: 66 DEGREES
SAO2 % BLDV: 81 % (ref 45–75)
SODIUM BLDV-SCNC: 140 MMOL/L (ref 136–145)
T AXIS: 62 DEGREES
T AXIS: 66 DEGREES
T OFFSET: 385 MS
T OFFSET: 388 MS
VENTRICULAR RATE: 103 BPM
VENTRICULAR RATE: 103 BPM

## 2024-10-22 PROCEDURE — 80143 DRUG ASSAY ACETAMINOPHEN: CPT

## 2024-10-22 PROCEDURE — 85652 RBC SED RATE AUTOMATED: CPT

## 2024-10-22 PROCEDURE — 96361 HYDRATE IV INFUSION ADD-ON: CPT

## 2024-10-22 PROCEDURE — 71046 X-RAY EXAM CHEST 2 VIEWS: CPT

## 2024-10-22 PROCEDURE — 86901 BLOOD TYPING SEROLOGIC RH(D): CPT

## 2024-10-22 PROCEDURE — 73564 X-RAY EXAM KNEE 4 OR MORE: CPT | Mod: LT

## 2024-10-22 PROCEDURE — 71046 X-RAY EXAM CHEST 2 VIEWS: CPT | Performed by: STUDENT IN AN ORGANIZED HEALTH CARE EDUCATION/TRAINING PROGRAM

## 2024-10-22 PROCEDURE — 73564 X-RAY EXAM KNEE 4 OR MORE: CPT | Mod: LEFT SIDE | Performed by: STUDENT IN AN ORGANIZED HEALTH CARE EDUCATION/TRAINING PROGRAM

## 2024-10-22 PROCEDURE — 2500000004 HC RX 250 GENERAL PHARMACY W/ HCPCS (ALT 636 FOR OP/ED): Mod: SE

## 2024-10-22 PROCEDURE — 96365 THER/PROPH/DIAG IV INF INIT: CPT

## 2024-10-22 PROCEDURE — 36415 COLL VENOUS BLD VENIPUNCTURE: CPT

## 2024-10-22 PROCEDURE — 99285 EMERGENCY DEPT VISIT HI MDM: CPT

## 2024-10-22 PROCEDURE — 84132 ASSAY OF SERUM POTASSIUM: CPT

## 2024-10-22 PROCEDURE — 85025 COMPLETE CBC W/AUTO DIFF WBC: CPT

## 2024-10-22 PROCEDURE — 83605 ASSAY OF LACTIC ACID: CPT

## 2024-10-22 PROCEDURE — 93005 ELECTROCARDIOGRAM TRACING: CPT

## 2024-10-22 PROCEDURE — 86140 C-REACTIVE PROTEIN: CPT

## 2024-10-22 PROCEDURE — 93010 ELECTROCARDIOGRAM REPORT: CPT

## 2024-10-22 PROCEDURE — 87040 BLOOD CULTURE FOR BACTERIA: CPT

## 2024-10-22 RX ORDER — VANCOMYCIN HYDROCHLORIDE 1 G/200ML
2 INJECTION, SOLUTION INTRAVENOUS ONCE
Status: COMPLETED | OUTPATIENT
Start: 2024-10-22 | End: 2024-10-23

## 2024-10-22 RX ORDER — HYDROMORPHONE HYDROCHLORIDE 1 MG/ML
1 INJECTION, SOLUTION INTRAMUSCULAR; INTRAVENOUS; SUBCUTANEOUS ONCE
Status: COMPLETED | OUTPATIENT
Start: 2024-10-22 | End: 2024-10-23

## 2024-10-22 RX ORDER — CEFTRIAXONE 2 G/50ML
2 INJECTION, SOLUTION INTRAVENOUS ONCE
Status: COMPLETED | OUTPATIENT
Start: 2024-10-22 | End: 2024-10-23

## 2024-10-22 ASSESSMENT — COLUMBIA-SUICIDE SEVERITY RATING SCALE - C-SSRS
2. HAVE YOU ACTUALLY HAD ANY THOUGHTS OF KILLING YOURSELF?: NO
1. IN THE PAST MONTH, HAVE YOU WISHED YOU WERE DEAD OR WISHED YOU COULD GO TO SLEEP AND NOT WAKE UP?: NO
6. HAVE YOU EVER DONE ANYTHING, STARTED TO DO ANYTHING, OR PREPARED TO DO ANYTHING TO END YOUR LIFE?: NO

## 2024-10-22 ASSESSMENT — PAIN DESCRIPTION - ORIENTATION: ORIENTATION: LEFT

## 2024-10-22 ASSESSMENT — PAIN SCALES - GENERAL: PAINLEVEL_OUTOF10: 7

## 2024-10-22 ASSESSMENT — PAIN - FUNCTIONAL ASSESSMENT: PAIN_FUNCTIONAL_ASSESSMENT: 0-10

## 2024-10-22 ASSESSMENT — PAIN DESCRIPTION - LOCATION: LOCATION: LEG
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
## 2024-10-23 PROBLEM — Z48.89 ENCOUNTER FOR POSTOPERATIVE WOUND CHECK: Status: ACTIVE | Noted: 2024-10-23

## 2024-10-23 LAB
ABO GROUP (TYPE) IN BLOOD: NORMAL
ALBUMIN SERPL BCP-MCNC: 2.3 G/DL (ref 3.4–5)
ALBUMIN SERPL BCP-MCNC: 2.8 G/DL (ref 3.4–5)
ALP SERPL-CCNC: 48 U/L (ref 33–120)
ALT SERPL W P-5'-P-CCNC: 6 U/L (ref 10–52)
AMPHETAMINES UR QL SCN: ABNORMAL
ANION GAP SERPL CALC-SCNC: 10 MMOL/L (ref 10–20)
ANION GAP SERPL CALC-SCNC: 12 MMOL/L (ref 10–20)
ANTIBODY SCREEN: NORMAL
APAP SERPL-MCNC: <10 UG/ML
APPEARANCE UR: CLEAR
AST SERPL W P-5'-P-CCNC: 13 U/L (ref 9–39)
BARBITURATES UR QL SCN: ABNORMAL
BASOPHILS # BLD AUTO: 0.02 X10*3/UL (ref 0–0.1)
BASOPHILS # BLD AUTO: 0.02 X10*3/UL (ref 0–0.1)
BASOPHILS NFR BLD AUTO: 0.3 %
BASOPHILS NFR BLD AUTO: 0.3 %
BENZODIAZ UR QL SCN: ABNORMAL
BILIRUB SERPL-MCNC: 0.2 MG/DL (ref 0–1.2)
BILIRUB UR STRIP.AUTO-MCNC: NEGATIVE MG/DL
BUN SERPL-MCNC: 13 MG/DL (ref 6–23)
BUN SERPL-MCNC: 16 MG/DL (ref 6–23)
BZE UR QL SCN: ABNORMAL
CALCIUM SERPL-MCNC: 8.2 MG/DL (ref 8.6–10.6)
CALCIUM SERPL-MCNC: 9.1 MG/DL (ref 8.6–10.6)
CANNABINOIDS UR QL SCN: ABNORMAL
CHLORIDE SERPL-SCNC: 104 MMOL/L (ref 98–107)
CHLORIDE SERPL-SCNC: 105 MMOL/L (ref 98–107)
CO2 SERPL-SCNC: 28 MMOL/L (ref 21–32)
CO2 SERPL-SCNC: 28 MMOL/L (ref 21–32)
COLOR UR: YELLOW
CREAT SERPL-MCNC: 0.84 MG/DL (ref 0.5–1.3)
CREAT SERPL-MCNC: 0.9 MG/DL (ref 0.5–1.3)
CRP SERPL-MCNC: 1.88 MG/DL
EGFRCR SERPLBLD CKD-EPI 2021: >90 ML/MIN/1.73M*2
EGFRCR SERPLBLD CKD-EPI 2021: >90 ML/MIN/1.73M*2
EOSINOPHIL # BLD AUTO: 0.09 X10*3/UL (ref 0–0.7)
EOSINOPHIL # BLD AUTO: 0.11 X10*3/UL (ref 0–0.7)
EOSINOPHIL NFR BLD AUTO: 1.3 %
EOSINOPHIL NFR BLD AUTO: 1.9 %
ERYTHROCYTE [DISTWIDTH] IN BLOOD BY AUTOMATED COUNT: 13 % (ref 11.5–14.5)
ERYTHROCYTE [DISTWIDTH] IN BLOOD BY AUTOMATED COUNT: 13.1 % (ref 11.5–14.5)
ERYTHROCYTE [SEDIMENTATION RATE] IN BLOOD BY WESTERGREN METHOD: 64 MM/H (ref 0–15)
ETHANOL SERPL-MCNC: <10 MG/DL
FENTANYL+NORFENTANYL UR QL SCN: ABNORMAL
GLUCOSE SERPL-MCNC: 94 MG/DL (ref 74–99)
GLUCOSE SERPL-MCNC: 97 MG/DL (ref 74–99)
GLUCOSE UR STRIP.AUTO-MCNC: NORMAL MG/DL
HCT VFR BLD AUTO: 25.5 % (ref 41–52)
HCT VFR BLD AUTO: 26.8 % (ref 41–52)
HGB BLD-MCNC: 8.1 G/DL (ref 13.5–17.5)
HGB BLD-MCNC: 8.7 G/DL (ref 13.5–17.5)
HOLD SPECIMEN: NORMAL
IMM GRANULOCYTES # BLD AUTO: 0.05 X10*3/UL (ref 0–0.7)
IMM GRANULOCYTES # BLD AUTO: 0.06 X10*3/UL (ref 0–0.7)
IMM GRANULOCYTES NFR BLD AUTO: 0.9 % (ref 0–0.9)
IMM GRANULOCYTES NFR BLD AUTO: 0.9 % (ref 0–0.9)
KETONES UR STRIP.AUTO-MCNC: NEGATIVE MG/DL
LACTATE SERPL-SCNC: 1.1 MMOL/L (ref 0.4–2)
LEUKOCYTE ESTERASE UR QL STRIP.AUTO: NEGATIVE
LYMPHOCYTES # BLD AUTO: 1.02 X10*3/UL (ref 1.2–4.8)
LYMPHOCYTES # BLD AUTO: 1.28 X10*3/UL (ref 1.2–4.8)
LYMPHOCYTES NFR BLD AUTO: 17.8 %
LYMPHOCYTES NFR BLD AUTO: 18.5 %
MCH RBC QN AUTO: 33.8 PG (ref 26–34)
MCH RBC QN AUTO: 34.4 PG (ref 26–34)
MCHC RBC AUTO-ENTMCNC: 31.8 G/DL (ref 32–36)
MCHC RBC AUTO-ENTMCNC: 32.5 G/DL (ref 32–36)
MCV RBC AUTO: 106 FL (ref 80–100)
MCV RBC AUTO: 106 FL (ref 80–100)
METHADONE UR QL SCN: ABNORMAL
MONOCYTES # BLD AUTO: 0.51 X10*3/UL (ref 0.1–1)
MONOCYTES # BLD AUTO: 0.57 X10*3/UL (ref 0.1–1)
MONOCYTES NFR BLD AUTO: 8.2 %
MONOCYTES NFR BLD AUTO: 8.9 %
MUCOUS THREADS #/AREA URNS AUTO: NORMAL /LPF
NEUTROPHILS # BLD AUTO: 4.02 X10*3/UL (ref 1.2–7.7)
NEUTROPHILS # BLD AUTO: 4.89 X10*3/UL (ref 1.2–7.7)
NEUTROPHILS NFR BLD AUTO: 70.2 %
NEUTROPHILS NFR BLD AUTO: 70.8 %
NITRITE UR QL STRIP.AUTO: NEGATIVE
NRBC BLD-RTO: 0 /100 WBCS (ref 0–0)
NRBC BLD-RTO: 0 /100 WBCS (ref 0–0)
OPIATES UR QL SCN: ABNORMAL
OXYCODONE+OXYMORPHONE UR QL SCN: ABNORMAL
PCP UR QL SCN: ABNORMAL
PH UR STRIP.AUTO: 6.5 [PH]
PHOSPHATE SERPL-MCNC: 3.3 MG/DL (ref 2.5–4.9)
PLATELET # BLD AUTO: 462 X10*3/UL (ref 150–450)
PLATELET # BLD AUTO: 489 X10*3/UL (ref 150–450)
POTASSIUM SERPL-SCNC: 3.9 MMOL/L (ref 3.5–5.3)
POTASSIUM SERPL-SCNC: 4 MMOL/L (ref 3.5–5.3)
PROT SERPL-MCNC: 7.8 G/DL (ref 6.4–8.2)
PROT UR STRIP.AUTO-MCNC: ABNORMAL MG/DL
RBC # BLD AUTO: 2.4 X10*6/UL (ref 4.5–5.9)
RBC # BLD AUTO: 2.53 X10*6/UL (ref 4.5–5.9)
RBC # UR STRIP.AUTO: NEGATIVE /UL
RBC #/AREA URNS AUTO: NORMAL /HPF
RH FACTOR (ANTIGEN D): NORMAL
SALICYLATES SERPL-MCNC: <3 MG/DL
SODIUM SERPL-SCNC: 139 MMOL/L (ref 136–145)
SODIUM SERPL-SCNC: 140 MMOL/L (ref 136–145)
SP GR UR STRIP.AUTO: 1.03
SQUAMOUS #/AREA URNS AUTO: NORMAL /HPF
UROBILINOGEN UR STRIP.AUTO-MCNC: NORMAL MG/DL
WBC # BLD AUTO: 5.7 X10*3/UL (ref 4.4–11.3)
WBC # BLD AUTO: 6.9 X10*3/UL (ref 4.4–11.3)
WBC #/AREA URNS AUTO: NORMAL /HPF

## 2024-10-23 PROCEDURE — 96366 THER/PROPH/DIAG IV INF ADDON: CPT

## 2024-10-23 PROCEDURE — 99222 1ST HOSP IP/OBS MODERATE 55: CPT

## 2024-10-23 PROCEDURE — 85025 COMPLETE CBC W/AUTO DIFF WBC: CPT

## 2024-10-23 PROCEDURE — 2500000001 HC RX 250 WO HCPCS SELF ADMINISTERED DRUGS (ALT 637 FOR MEDICARE OP)

## 2024-10-23 PROCEDURE — 80307 DRUG TEST PRSMV CHEM ANLYZR: CPT

## 2024-10-23 PROCEDURE — 80349 CANNABINOIDS NATURAL: CPT

## 2024-10-23 PROCEDURE — 81003 URINALYSIS AUTO W/O SCOPE: CPT

## 2024-10-23 PROCEDURE — 2500000004 HC RX 250 GENERAL PHARMACY W/ HCPCS (ALT 636 FOR OP/ED): Mod: SE

## 2024-10-23 PROCEDURE — 2500000002 HC RX 250 W HCPCS SELF ADMINISTERED DRUGS (ALT 637 FOR MEDICARE OP, ALT 636 FOR OP/ED)

## 2024-10-23 PROCEDURE — 2500000004 HC RX 250 GENERAL PHARMACY W/ HCPCS (ALT 636 FOR OP/ED)

## 2024-10-23 PROCEDURE — 1100000001 HC PRIVATE ROOM DAILY

## 2024-10-23 PROCEDURE — 80069 RENAL FUNCTION PANEL: CPT

## 2024-10-23 PROCEDURE — 96375 TX/PRO/DX INJ NEW DRUG ADDON: CPT

## 2024-10-23 PROCEDURE — 96367 TX/PROPH/DG ADDL SEQ IV INF: CPT

## 2024-10-23 PROCEDURE — 36415 COLL VENOUS BLD VENIPUNCTURE: CPT

## 2024-10-23 RX ORDER — CEFTRIAXONE 2 G/50ML
2 INJECTION, SOLUTION INTRAVENOUS EVERY 24 HOURS
Status: DISCONTINUED | OUTPATIENT
Start: 2024-10-23 | End: 2024-10-25 | Stop reason: HOSPADM

## 2024-10-23 RX ORDER — SULFAMETHOXAZOLE AND TRIMETHOPRIM 800; 160 MG/1; MG/1
2 TABLET ORAL 3 TIMES WEEKLY
Status: DISCONTINUED | OUTPATIENT
Start: 2024-10-23 | End: 2024-10-23

## 2024-10-23 RX ORDER — ENOXAPARIN SODIUM 100 MG/ML
40 INJECTION SUBCUTANEOUS DAILY
Status: DISCONTINUED | OUTPATIENT
Start: 2024-10-23 | End: 2024-10-25 | Stop reason: HOSPADM

## 2024-10-23 RX ORDER — VANCOMYCIN HYDROCHLORIDE 1 G/20ML
INJECTION, POWDER, LYOPHILIZED, FOR SOLUTION INTRAVENOUS DAILY PRN
Status: DISCONTINUED | OUTPATIENT
Start: 2024-10-23 | End: 2024-10-25 | Stop reason: HOSPADM

## 2024-10-23 RX ORDER — NYSTATIN 100000 [USP'U]/ML
5 SUSPENSION ORAL 3 TIMES DAILY
Status: DISCONTINUED | OUTPATIENT
Start: 2024-10-23 | End: 2024-10-25 | Stop reason: HOSPADM

## 2024-10-23 RX ORDER — GABAPENTIN 300 MG/1
300 CAPSULE ORAL EVERY 8 HOURS SCHEDULED
Status: DISCONTINUED | OUTPATIENT
Start: 2024-10-23 | End: 2024-10-25 | Stop reason: HOSPADM

## 2024-10-23 RX ORDER — SULFAMETHOXAZOLE AND TRIMETHOPRIM 800; 160 MG/1; MG/1
1 TABLET ORAL 3 TIMES WEEKLY
Status: DISCONTINUED | OUTPATIENT
Start: 2024-10-23 | End: 2024-10-25

## 2024-10-23 RX ORDER — OLANZAPINE 5 MG/1
5 TABLET ORAL 2 TIMES DAILY
Status: DISCONTINUED | OUTPATIENT
Start: 2024-10-23 | End: 2024-10-25 | Stop reason: HOSPADM

## 2024-10-23 RX ORDER — POLYETHYLENE GLYCOL 3350 17 G/17G
17 POWDER, FOR SOLUTION ORAL DAILY
Status: DISCONTINUED | OUTPATIENT
Start: 2024-10-23 | End: 2024-10-25 | Stop reason: HOSPADM

## 2024-10-23 RX ORDER — DIVALPROEX SODIUM 250 MG/1
500 TABLET, DELAYED RELEASE ORAL 2 TIMES DAILY
Status: DISCONTINUED | OUTPATIENT
Start: 2024-10-23 | End: 2024-10-25 | Stop reason: HOSPADM

## 2024-10-23 RX ORDER — ACETAMINOPHEN 650 MG/1
650 SUPPOSITORY RECTAL EVERY 4 HOURS PRN
Status: DISCONTINUED | OUTPATIENT
Start: 2024-10-23 | End: 2024-10-25 | Stop reason: HOSPADM

## 2024-10-23 RX ORDER — ACETAMINOPHEN 160 MG/5ML
650 SOLUTION ORAL EVERY 4 HOURS PRN
Status: DISCONTINUED | OUTPATIENT
Start: 2024-10-23 | End: 2024-10-25 | Stop reason: HOSPADM

## 2024-10-23 RX ORDER — FERROUS SULFATE, DRIED 160(50) MG
2 TABLET, EXTENDED RELEASE ORAL DAILY
Status: DISCONTINUED | OUTPATIENT
Start: 2024-10-23 | End: 2024-10-25 | Stop reason: HOSPADM

## 2024-10-23 RX ORDER — ACETAMINOPHEN 325 MG/1
650 TABLET ORAL EVERY 4 HOURS PRN
Status: DISCONTINUED | OUTPATIENT
Start: 2024-10-23 | End: 2024-10-25 | Stop reason: HOSPADM

## 2024-10-23 RX ORDER — OXYCODONE HYDROCHLORIDE 5 MG/1
5 TABLET ORAL EVERY 6 HOURS PRN
Status: DISCONTINUED | OUTPATIENT
Start: 2024-10-23 | End: 2024-10-25 | Stop reason: HOSPADM

## 2024-10-23 SDOH — ECONOMIC STABILITY: FOOD INSECURITY: HOW HARD IS IT FOR YOU TO PAY FOR THE VERY BASICS LIKE FOOD, HOUSING, MEDICAL CARE, AND HEATING?: SOMEWHAT HARD

## 2024-10-23 SDOH — SOCIAL STABILITY: SOCIAL INSECURITY: WITHIN THE LAST YEAR, HAVE YOU BEEN AFRAID OF YOUR PARTNER OR EX-PARTNER?: NO

## 2024-10-23 SDOH — ECONOMIC STABILITY: INCOME INSECURITY: IN THE PAST 12 MONTHS HAS THE ELECTRIC, GAS, OIL, OR WATER COMPANY THREATENED TO SHUT OFF SERVICES IN YOUR HOME?: NO

## 2024-10-23 SDOH — SOCIAL STABILITY: SOCIAL INSECURITY: HAS ANYONE EVER THREATENED TO HURT YOUR FAMILY OR YOUR PETS?: NO

## 2024-10-23 SDOH — SOCIAL STABILITY: SOCIAL INSECURITY: ARE YOU OR HAVE YOU BEEN THREATENED OR ABUSED PHYSICALLY, EMOTIONALLY, OR SEXUALLY BY ANYONE?: NO

## 2024-10-23 SDOH — ECONOMIC STABILITY: HOUSING INSECURITY: IN THE PAST 12 MONTHS, HOW MANY TIMES HAVE YOU MOVED WHERE YOU WERE LIVING?: 0

## 2024-10-23 SDOH — SOCIAL STABILITY: SOCIAL INSECURITY: ARE THERE ANY APPARENT SIGNS OF INJURIES/BEHAVIORS THAT COULD BE RELATED TO ABUSE/NEGLECT?: NO

## 2024-10-23 SDOH — SOCIAL STABILITY: SOCIAL INSECURITY: WITHIN THE LAST YEAR, HAVE YOU BEEN HUMILIATED OR EMOTIONALLY ABUSED IN OTHER WAYS BY YOUR PARTNER OR EX-PARTNER?: NO

## 2024-10-23 SDOH — SOCIAL STABILITY: SOCIAL INSECURITY: WERE YOU ABLE TO COMPLETE ALL THE BEHAVIORAL HEALTH SCREENINGS?: YES

## 2024-10-23 SDOH — ECONOMIC STABILITY: FOOD INSECURITY: WITHIN THE PAST 12 MONTHS, THE FOOD YOU BOUGHT JUST DIDN'T LAST AND YOU DIDN'T HAVE MONEY TO GET MORE.: NEVER TRUE

## 2024-10-23 SDOH — ECONOMIC STABILITY: FOOD INSECURITY: WITHIN THE PAST 12 MONTHS, YOU WORRIED THAT YOUR FOOD WOULD RUN OUT BEFORE YOU GOT THE MONEY TO BUY MORE.: NEVER TRUE

## 2024-10-23 SDOH — ECONOMIC STABILITY: HOUSING INSECURITY: IN THE LAST 12 MONTHS, WAS THERE A TIME WHEN YOU WERE NOT ABLE TO PAY THE MORTGAGE OR RENT ON TIME?: YES

## 2024-10-23 SDOH — SOCIAL STABILITY: SOCIAL INSECURITY: HAVE YOU HAD ANY THOUGHTS OF HARMING ANYONE ELSE?: NO

## 2024-10-23 SDOH — ECONOMIC STABILITY: TRANSPORTATION INSECURITY: IN THE PAST 12 MONTHS, HAS LACK OF TRANSPORTATION KEPT YOU FROM MEDICAL APPOINTMENTS OR FROM GETTING MEDICATIONS?: YES

## 2024-10-23 SDOH — SOCIAL STABILITY: SOCIAL INSECURITY: HAVE YOU HAD THOUGHTS OF HARMING ANYONE ELSE?: NO

## 2024-10-23 SDOH — ECONOMIC STABILITY: HOUSING INSECURITY: AT ANY TIME IN THE PAST 12 MONTHS, WERE YOU HOMELESS OR LIVING IN A SHELTER (INCLUDING NOW)?: NO

## 2024-10-23 SDOH — SOCIAL STABILITY: SOCIAL INSECURITY: DOES ANYONE TRY TO KEEP YOU FROM HAVING/CONTACTING OTHER FRIENDS OR DOING THINGS OUTSIDE YOUR HOME?: NO

## 2024-10-23 SDOH — SOCIAL STABILITY: SOCIAL INSECURITY: DO YOU FEEL ANYONE HAS EXPLOITED OR TAKEN ADVANTAGE OF YOU FINANCIALLY OR OF YOUR PERSONAL PROPERTY?: NO

## 2024-10-23 SDOH — SOCIAL STABILITY: SOCIAL INSECURITY: ABUSE: ADULT

## 2024-10-23 SDOH — SOCIAL STABILITY: SOCIAL INSECURITY: DO YOU FEEL UNSAFE GOING BACK TO THE PLACE WHERE YOU ARE LIVING?: NO

## 2024-10-23 ASSESSMENT — COGNITIVE AND FUNCTIONAL STATUS - GENERAL
PATIENT BASELINE BEDBOUND: NO
MOVING TO AND FROM BED TO CHAIR: A LITTLE
TOILETING: A LITTLE
STANDING UP FROM CHAIR USING ARMS: A LITTLE
HELP NEEDED FOR BATHING: A LITTLE
DAILY ACTIVITIY SCORE: 21
MOBILITY SCORE: 20
DRESSING REGULAR LOWER BODY CLOTHING: A LITTLE
CLIMB 3 TO 5 STEPS WITH RAILING: A LITTLE
WALKING IN HOSPITAL ROOM: A LITTLE

## 2024-10-23 ASSESSMENT — PATIENT HEALTH QUESTIONNAIRE - PHQ9
2. FEELING DOWN, DEPRESSED OR HOPELESS: NOT AT ALL
SUM OF ALL RESPONSES TO PHQ9 QUESTIONS 1 & 2: 0
1. LITTLE INTEREST OR PLEASURE IN DOING THINGS: NOT AT ALL

## 2024-10-23 ASSESSMENT — ACTIVITIES OF DAILY LIVING (ADL)
LACK_OF_TRANSPORTATION: YES
HEARING - LEFT EAR: FUNCTIONAL
ADEQUATE_TO_COMPLETE_ADL: YES
FEEDING YOURSELF: INDEPENDENT
PATIENT'S MEMORY ADEQUATE TO SAFELY COMPLETE DAILY ACTIVITIES?: YES
LACK_OF_TRANSPORTATION: YES
ASSISTIVE_DEVICE: CRUTCHES
GROOMING: NEEDS ASSISTANCE
TOILETING: NEEDS ASSISTANCE
JUDGMENT_ADEQUATE_SAFELY_COMPLETE_DAILY_ACTIVITIES: YES
BATHING: NEEDS ASSISTANCE
DRESSING YOURSELF: NEEDS ASSISTANCE
LACK_OF_TRANSPORTATION: YES
WALKS IN HOME: INDEPENDENT
HEARING - RIGHT EAR: FUNCTIONAL

## 2024-10-23 ASSESSMENT — ENCOUNTER SYMPTOMS
APPETITE CHANGE: 0
COUGH: 0
CONSTIPATION: 0
STRIDOR: 0
SHORTNESS OF BREATH: 0
FATIGUE: 0
FREQUENCY: 0
JOINT SWELLING: 1
ARTHRALGIAS: 1
CHOKING: 0
CHEST TIGHTNESS: 0
PALPITATIONS: 0
FLANK PAIN: 0
FEVER: 0
DYSURIA: 0
CHILLS: 0
ABDOMINAL PAIN: 0
DIARRHEA: 0
VOMITING: 0
RECTAL PAIN: 0
WHEEZING: 0

## 2024-10-23 ASSESSMENT — LIFESTYLE VARIABLES
AUDIT-C TOTAL SCORE: 10
EVER HAD A DRINK FIRST THING IN THE MORNING TO STEADY YOUR NERVES TO GET RID OF A HANGOVER: NO
HAVE YOU EVER FELT YOU SHOULD CUT DOWN ON YOUR DRINKING: NO
HOW OFTEN DO YOU HAVE A DRINK CONTAINING ALCOHOL: 4 OR MORE TIMES A WEEK
SKIP TO QUESTIONS 9-10: 0
HOW MANY STANDARD DRINKS CONTAINING ALCOHOL DO YOU HAVE ON A TYPICAL DAY: 5 OR 6
HAVE PEOPLE ANNOYED YOU BY CRITICIZING YOUR DRINKING: NO
EVER FELT BAD OR GUILTY ABOUT YOUR DRINKING: NO
TOTAL SCORE: 0
HOW OFTEN DO YOU HAVE 6 OR MORE DRINKS ON ONE OCCASION: DAILY OR ALMOST DAILY
AUDIT-C TOTAL SCORE: 10

## 2024-10-23 NOTE — PROGRESS NOTES
Pharmacy Medication History Review    Romaine Rodgers is a 44 y.o. male admitted for Encounter for postoperative wound check. Pharmacy reviewed the patient's kbzjn-sy-jnlroabsw medications and allergies for accuracy.    Medications ADDED:  None  Medications CHANGED:  None  Medications REMOVED:   None     The list below reflects the updated PTA list.   Prior to Admission Medications   Prescriptions Last Dose Informant   OLANZapine (ZyPREXA) 5 mg tablet     Sig: Take 1 tablet (5 mg) by mouth 2 times a day for 14 days.   Patient not taking: Reported on 10/15/2024   ienvkuxjq-rbkqwqgu-beayqwt ala (Biktarvy) -25 mg tablet  Self   Sig: Take 1 tablet by mouth once daily.   calcium carbonate-vitamin D3 (Oscal-500) 500 mg-10 mcg (400 unit) tablet  Self   Sig: Take 1 tablet by mouth once daily.   gabapentin (Neurontin) 300 mg capsule     Sig: Take 1 capsule (300 mg) by mouth every 8 hours for 14 days.   Patient not taking: Reported on 10/15/2024   methocarbamol (Robaxin) 750 mg tablet     Sig: Take 1 tablet (750 mg) by mouth every 6 hours for 7 days.   Patient not taking: Reported on 10/15/2024   nicotine (Nicoderm CQ) 14 mg/24 hr patch  Self   Sig: Place 1 patch over 24 hours on the skin once daily.   Patient not taking: Reported on 10/15/2024   nystatin (Mycostatin) 100,000 unit/mL suspension  Self   Sig: Swish and spit 5 mL (500,000 Units) 3 times a day for 16 doses.   sulfamethoxazole-trimethoprim (Bactrim DS) 800-160 mg tablet  Self   Sig: Take 1 tablet by mouth once daily.   traZODone (Desyrel) 50 mg tablet     Sig: Take 0.5 tablets (25 mg) by mouth as needed at bedtime for sleep (only if melatonin was taken and not taking effect before midnight) for up to 14 days.   Patient not taking: Reported on 10/15/2024   valproic acid (Depakene) 250 mg capsule     Sig: Take 2 capsules (500 mg) by mouth 2 times a day for 14 days.   Patient not taking: Reported on 10/15/2024      Facility-Administered Medications: None     "    The list below reflects the updated allergy list. Please review each documented allergy for additional clarification and justification.  Allergies  Reviewed by Michael Lynne PharmD on 10/23/2024   No Known Allergies         Patient accepts M2B at discharge.     Sources:   [facility]  Pharmacy dispense history  Chart review/Care Everywhere  Patient interview    Additional Comments:  Patient was sedated/somnolent when attempting to obtain current medications. Patient stated he was not taking any medications PTA. Per dispense history, gabapentin, methocarbamol, olanzapine, trazodone, and valproic acid last filled 7/19/24 for either 7 or 14 day supply. Per 10/20/24 [facility] discharge summary, patient advised to start biktarvy, calcium with vitamin D3, nystatin suspension, and bactrim DS. However, there are no recent dispenses for any of the medications.       Michael Lynne PharmD  Transitions of Care Pharmacist  10/23/24     Secure Chat preferred   If no response call [phone] or [employer] \"Med Rec\"    "

## 2024-10-23 NOTE — CONSULTS
Vancomycin Dosing by Pharmacy- INITIAL    Romaine Rodgers is a 44 y.o. year old male who Pharmacy has been consulted for vancomycin dosing for osteomyelitis/septic arthritis. Based on the patient's indication and renal status this patient will be dosed based on a goal AUC of 400-600.     Renal function is currently stable.    Visit Vitals  /77 (BP Location: Right arm, Patient Position: Lying)   Pulse 97   Temp 36.7 °C (98.1 °F) (Oral)   Resp 20        Lab Results   Component Value Date    CREATININE 0.90 10/22/2024    CREATININE 1.08 10/20/2024    CREATININE 0.98 10/19/2024    CREATININE 1.10 10/18/2024        Patient weight is as follows:   Vitals:    10/22/24 1438   Weight: 69.9 kg (154 lb)       Cultures:  No results found for the encounter in last 14 days.        No intake/output data recorded.  I/O during current shift:  No intake/output data recorded.    Temp (24hrs), Av.9 °C (98.4 °F), Min:36.6 °C (97.9 °F), Max:37.3 °C (99.2 °F)         Assessment/Plan     Patient received 2 gm this morning. Will start 1500 mg Q12H.    This dosing regimen is predicted by [employer] to result in the following pharmacokinetic parameters:  Regimen: 1500 mg IV every 12 hours.  Start time: 1100 on 10/23/2024  Exposure target: AUC24 (range)400-600 mg/L.hr   KPR91-08: 496 mg/L.hr  AUC24,ss: 551 mg/L.hr  Probability of AUC24 > 400: 99 %    Follow-up level tomorrow morning.  Will continue to monitor renal function daily while on vancomycin and order serum creatinine at least every 48 hours if not already ordered.  Follow for continued vancomycin needs, clinical response, and signs/symptoms of toxicity.       Jalil Ratliff, PharmD
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
## 2024-10-23 NOTE — CONSULTS
[facility] Department of Orthopaedic Surgery   Initial Consult Note    HPI:     Orthopaedic Problems/Injuries: Post op check w increasing L knee pain      44M (HIV, polysubstance abuse, s/p rIMN L femur 7/6, L Knee I&D and ISAK for SA on 10/17 w Dr. Chavez) p/t reestablish care. Previously left AMA. Was not taking abx. reports increasing L knee pain over past week w trouble bearing weight. On exam, effusion w moderate pain w SAs. Incisions well approximated with no drainage or erythema. ESR: 64 (130, 1 week prior), CRP: 1.88 (23.09, 1 week prior)     PMH: per above/EMR  PSH: per above/EMR  FamHx:  Non-contributory to this patient's acute orthopaedic problem.   Allergies: Reviewed in EMR  Meds: Reviewed in EMR    ROS  12-point review of systems is negative other than what is mentioned above.    Physical Exam:  Gen: AOx3, NAD  HEENT: normocephalic, atraumatic  Psych: appropriate mood and affect  Resp: nonlabored breathing  Cardiac: Extremities WWP, regular rate on peripheral palpation  Neuro: moves all extremities spontaneously   Skin: no rashes  MSK:   Left lower extremity:  - Post operative effusion of L knee. Incisions well approximated with no dehiscence, purulance or drainage.   - Mild to moderate pain w short arcs   - Compartments soft and compressible  - Fires TA/GS/EHL  - SILT in Price/Sa/SP/DP/T distribution  - Palpable DP pulse      A full secondary exam was performed and all relevant findings discussed and noted above.    Imaging:    XR demonstrates post operative changes of the L knee w post operative effusion. No fractures or new bony abnormalities.     Assessment:  Orthopaedic Problems/Injuries: Post op check w increasing L knee pain      44M (HIV, polysubstance abuse, s/p rIMN L femur 7/6, L Knee I&D and ISAK for SA on 10/17 w Dr. Chavez) p/t reestablish care. Previously left AMA. Was not taking abx. reports increasing L knee pain over past week w trouble bearing weight. On exam, effusion w  moderate pain w SAs. Incisions well approximated with no drainage or erythema. ESR: 64 (130, 1 week prior), CRP: 1.88 (23.09, 1 week prior)     Plan:  - no acute orthopaedic intervention  - WB: WBAT LLE  - Recommend IV abx  - Continue regular FU w Dr. Chavez    This patient was seen and evaluated within 30 minutes of initial consult.     Staffed and discussed with attending. Please don't hesitate to reach out with any questions.    Armond Baltazar MD  Orthopaedic Surgery PGY-2   Epic Chat preferred    Please page [phone] (on-call pager) on weekends and between 6p-7a on weekdays.  _________________________________________________________    While admitted, this patient will be followed peripherally by the Ortho Trauma Team. Please contact the residents listed below with any questions (available via Epic Chat).      First call: Nolberto Olivares, PGY-2  Second call: Bridgette Barajas, PGY-3     Please call the ortho pager [phone]) on weekends and between 6p-7a on weekdays.

## 2024-10-23 NOTE — SIGNIFICANT EVENT
ID    Pt well known to ID service. Seen by ID consult service 10/18. Pt left AMA 10/20, returned 10/22.  Plan has not changed. Please see consult note of 10/18 for recommendations.
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
## 2024-10-24 LAB
ALBUMIN SERPL BCP-MCNC: 2.4 G/DL (ref 3.4–5)
ANION GAP SERPL CALC-SCNC: 11 MMOL/L (ref 10–20)
BASOPHILS # BLD AUTO: 0.01 X10*3/UL (ref 0–0.1)
BASOPHILS NFR BLD AUTO: 0.2 %
BUN SERPL-MCNC: 11 MG/DL (ref 6–23)
CALCIUM SERPL-MCNC: 8.8 MG/DL (ref 8.6–10.6)
CHLORIDE SERPL-SCNC: 106 MMOL/L (ref 98–107)
CO2 SERPL-SCNC: 26 MMOL/L (ref 21–32)
CREAT SERPL-MCNC: 1.09 MG/DL (ref 0.5–1.3)
EGFRCR SERPLBLD CKD-EPI 2021: 87 ML/MIN/1.73M*2
EOSINOPHIL # BLD AUTO: 0.12 X10*3/UL (ref 0–0.7)
EOSINOPHIL NFR BLD AUTO: 2.4 %
ERYTHROCYTE [DISTWIDTH] IN BLOOD BY AUTOMATED COUNT: 13 % (ref 11.5–14.5)
GLUCOSE SERPL-MCNC: 99 MG/DL (ref 74–99)
HCT VFR BLD AUTO: 25.8 % (ref 41–52)
HGB BLD-MCNC: 8.3 G/DL (ref 13.5–17.5)
IMM GRANULOCYTES # BLD AUTO: 0.05 X10*3/UL (ref 0–0.7)
IMM GRANULOCYTES NFR BLD AUTO: 1 % (ref 0–0.9)
LYMPHOCYTES # BLD AUTO: 1.23 X10*3/UL (ref 1.2–4.8)
LYMPHOCYTES NFR BLD AUTO: 24.3 %
MAGNESIUM SERPL-MCNC: 1.75 MG/DL (ref 1.6–2.4)
MCH RBC QN AUTO: 33.6 PG (ref 26–34)
MCHC RBC AUTO-ENTMCNC: 32.2 G/DL (ref 32–36)
MCV RBC AUTO: 105 FL (ref 80–100)
MONOCYTES # BLD AUTO: 0.48 X10*3/UL (ref 0.1–1)
MONOCYTES NFR BLD AUTO: 9.5 %
NEUTROPHILS # BLD AUTO: 3.18 X10*3/UL (ref 1.2–7.7)
NEUTROPHILS NFR BLD AUTO: 62.6 %
NRBC BLD-RTO: 0 /100 WBCS (ref 0–0)
PHOSPHATE SERPL-MCNC: 3.5 MG/DL (ref 2.5–4.9)
PLATELET # BLD AUTO: 508 X10*3/UL (ref 150–450)
POTASSIUM SERPL-SCNC: 4 MMOL/L (ref 3.5–5.3)
RBC # BLD AUTO: 2.47 X10*6/UL (ref 4.5–5.9)
SODIUM SERPL-SCNC: 139 MMOL/L (ref 136–145)
VANCOMYCIN SERPL-MCNC: 21 UG/ML (ref 5–20)
WBC # BLD AUTO: 5.1 X10*3/UL (ref 4.4–11.3)

## 2024-10-24 PROCEDURE — 99232 SBSQ HOSP IP/OBS MODERATE 35: CPT

## 2024-10-24 PROCEDURE — 83735 ASSAY OF MAGNESIUM: CPT

## 2024-10-24 PROCEDURE — 85025 COMPLETE CBC W/AUTO DIFF WBC: CPT

## 2024-10-24 PROCEDURE — 2500000004 HC RX 250 GENERAL PHARMACY W/ HCPCS (ALT 636 FOR OP/ED)

## 2024-10-24 PROCEDURE — 2500000002 HC RX 250 W HCPCS SELF ADMINISTERED DRUGS (ALT 637 FOR MEDICARE OP, ALT 636 FOR OP/ED)

## 2024-10-24 PROCEDURE — 80069 RENAL FUNCTION PANEL: CPT

## 2024-10-24 PROCEDURE — 36415 COLL VENOUS BLD VENIPUNCTURE: CPT

## 2024-10-24 PROCEDURE — 2500000001 HC RX 250 WO HCPCS SELF ADMINISTERED DRUGS (ALT 637 FOR MEDICARE OP)

## 2024-10-24 PROCEDURE — 1100000001 HC PRIVATE ROOM DAILY

## 2024-10-24 PROCEDURE — 80202 ASSAY OF VANCOMYCIN: CPT

## 2024-10-24 RX ORDER — OLANZAPINE 5 MG/1
5 TABLET ORAL EVERY 8 HOURS PRN
Status: DISCONTINUED | OUTPATIENT
Start: 2024-10-24 | End: 2024-10-25 | Stop reason: HOSPADM

## 2024-10-24 RX ORDER — LANOLIN ALCOHOL/MO/W.PET/CERES
400 CREAM (GRAM) TOPICAL ONCE
Status: COMPLETED | OUTPATIENT
Start: 2024-10-24 | End: 2024-10-24

## 2024-10-24 ASSESSMENT — COGNITIVE AND FUNCTIONAL STATUS - GENERAL
CLIMB 3 TO 5 STEPS WITH RAILING: A LITTLE
HELP NEEDED FOR BATHING: A LITTLE
TOILETING: A LITTLE
STANDING UP FROM CHAIR USING ARMS: A LITTLE
DRESSING REGULAR LOWER BODY CLOTHING: A LITTLE
DAILY ACTIVITIY SCORE: 21
MOBILITY SCORE: 20
MOVING TO AND FROM BED TO CHAIR: A LITTLE
WALKING IN HOSPITAL ROOM: A LITTLE

## 2024-10-24 ASSESSMENT — PAIN SCALES - GENERAL
PAINLEVEL_OUTOF10: 8
PAINLEVEL_OUTOF10: 8

## 2024-10-24 ASSESSMENT — ACTIVITIES OF DAILY LIVING (ADL): LACK_OF_TRANSPORTATION: NO

## 2024-10-24 ASSESSMENT — PAIN SCALES - WONG BAKER: WONGBAKER_NUMERICALRESPONSE: HURTS LITTLE MORE

## 2024-10-24 ASSESSMENT — PAIN DESCRIPTION - ORIENTATION: ORIENTATION: LEFT

## 2024-10-24 ASSESSMENT — PAIN DESCRIPTION - LOCATION: LOCATION: KNEE

## 2024-10-24 NOTE — PROGRESS NOTES
Vancomycin Dosing by Pharmacy- FOLLOW UP    Romaine Rodgers is a 44 y.o. year old male who Pharmacy has been consulted for vancomycin dosing for osteomyelitis/septic arthritis. Based on the patient's indication and renal status this patient is being dosed based on a goal AUC of 400-600.     Renal function is currently stable.    Current vancomycin dose: 1500 mg given every 12 hours    Estimated vancomycin AUC on current dose: 674 mg/L.hr     Visit Vitals  /71 (BP Location: Right arm, Patient Position: Lying)   Pulse 90   Temp 36.6 °C (97.9 °F) (Temporal)   Resp 18        Lab Results   Component Value Date    CREATININE 1.09 10/24/2024    CREATININE 0.84 10/23/2024    CREATININE 0.90 10/22/2024    CREATININE 1.08 10/20/2024        Patient weight is as follows:   Vitals:    10/23/24 1703   Weight: 67.8 kg (149 lb 6.4 oz)       Cultures:  No results found for the encounter in last 14 days.       I/O last 3 completed shifts:  In: - (0 mL/kg)   Out: 575 (8.5 mL/kg) [Urine:575 (0.2 mL/kg/hr)]  Weight: 67.8 kg   I/O during current shift:  No intake/output data recorded.    Temp (24hrs), Av.7 °C (98 °F), Min:36.5 °C (97.7 °F), Max:36.9 °C (98.4 °F)      Assessment/Plan    Above goal AUC. Orders placed for new vancomcyin regimen of 1250 every 12 hours to begin at 1000.    This dosing regimen is predicted by [employer] to result in the following pharmacokinetic parameters:  Loading dose: N/A  Regimen: 1250 mg IV every 12 hours.  Start time: 09:40 on 10/24/2024  Exposure target: AUC24 (range)400-600 mg/L.hr   [id number]: 564 mg/L.hr  AUC24,ss: 563 mg/L.hr  Probability of AUC24 > 400: 100 %  Ctrough,ss: 15.9 mg/L  Probability of Ctrough,ss > 20: 10 %      The next level will be obtained on 10/25 at 0500. May be obtained sooner if clinically indicated.   Will continue to monitor renal function daily while on vancomycin and order serum creatinine at least every 48 hours if not already ordered.  Follow for continued  vancomycin needs, clinical response, and signs/symptoms of toxicity.       Loretta Hedrick, PharmD

## 2024-10-24 NOTE — CARE PLAN
The patient's goals for the shift include      The clinical goals for the shift include Pts pain leroy be at or below 7/10 through shift    Over the shift, the patient did not make progress toward the following goals. Barriers to progression include knee wound. Recommendations to address these barriers include PRN pain medication.
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
## 2024-10-24 NOTE — PROGRESS NOTES
Romaine Rodgers is a 44 y.o. male on day 1 of admission presenting with Encounter for postoperative wound check.    MONICA will continue communication with [facility] Prosecutor, Sondra Macias ([phone]).    MONICA attempted to call Sondra ([phone]) to let her know pt's adod, but she did not answer. SW left  stating who she is.    MONICA will follow up.    Ana Maria Graf MSW [facility]  Care Transitions  2  [phone] or Epic Secure Chat
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
## 2024-10-24 NOTE — SIGNIFICANT EVENT
Pt seen, examined, labs reviewed.  Appears to understand importance of prolonged antibiotic treatment for septic arthritis L knee. Hesitant about midline/PICC.  Optimal Rx is 3-4 wks of antibiotics but does not need to be all IV. So if he is willing to stay a bit for IV antibiotics, then he can be switched to orals to complete a course. Staph/strep remain major pathogens.   Recommend: 1. Continue vanc/ceftriaxone IV while in hosp.  2. At discharge can switch to po cefadroxil 1000 mg po bid and increase his TMP/SMZ prophylaxis dose to 2 DS bid to complete a 4 wk course  3. Pt receives HIV care at [facility] and they can F/U L knee infection    ID will sign off
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
## 2024-10-24 NOTE — PROGRESS NOTES
"Physical Therapy                 Therapy Communication Note    Patient Name: Romaine Rodgers  MRN: [id number]  Department: [facility]  Room: 5514/5514-A  Today's Date: 10/24/2024     Discipline: Physical Therapy    Missed Visit Reason: Missed Visit Reason:  (@0252 pt declines OOB mobility at this time despite encouragement and education on benefits of mobility assessment. Pt reports minimal mobility, only getting up for bathroom as he has been labeled \"a flight risk\" and was yelled at for getting OOB this AM. Educated pt that PT will reattempt eval next date available to ensure safe mobility for discharge.  Pt does have crutches in his room currently, states he only uses them for community distances, does not use them in his apt as he has other things to hold onto. )    Missed Time: Attempt      10/24/24 at 3:28 PM - Carmela Anand, PT   "

## 2024-10-24 NOTE — CONSULTS
"Nutrition Initial Assessment:   Nutrition Assessment    Reason for Assessment: Admission nursing screening    Patient is a 44 y.o. male presenting with knee pain to ED , recent admission for L knee septic arthritis- s/p washout 10/17 and PNA and left AMA not getting antibiotics outpatient. Admitted to establish care for his septic arthritis   PMH of HIV, Bipolar disorder, PTSD, polysubstance abuse, arthritis and PNA     Malnutrition Screening Tool (MST)  Have you recently lost weight without trying?: Yes  If yes, how much weight have you lost?: Lost 2 - 13 pounds  Weight Loss Score: 1  Have you been eating poorly because of a decreased appetite?: Yes  Malnutrition Score: 2     Nutrition History:  Energy Intake: Good > 75 % (NO Po documented, Pt report adequate PO)  Food and Nutrient History: RDN received nutrition consult related to positive admission screen with an MST score of 2 for weight loss and decreased appetite.Pt reports to be tolerating oral diet, denied any concerns related to nutrition. Pt does report a weight loss over 30#, prior to gradual weight loss He was 170#. Pt reports no prior use of ONS, declined need at this time. Reporting in the past Pt would buy ONS and give them away to \"females\" , unsure if Pt was using them. No known allergies, no difficulty chewing or swallowing reported, denied any n,v,c, reports diarrhea. No other concerns expressed at this time.  Food Allergies/Intolerances:  None  GI Symptoms: Diarrhea  Oral Problems: None       Anthropometrics:  Height: 180.3 cm (5' 11\")   Weight: 67.8 kg (149 lb 6.4 oz)   BMI (Calculated): 20.85  IBW/kg (Dietitian Calculated): 78.2 kg  Percent of IBW: 87 %       Weight History:   Wt Readings from Last 20 Encounters:   10/23/24 67.8 kg (149 lb 6.4 oz)   10/14/24 69.9 kg (154 lb)   07/30/24 69.9 kg (154 lb)   3/21/24  74.7 kg /164#  Weight Change %:  Weight History / % Weight Change: Limited weights to assess per EMR. Per available weights Pt with " "an unintentional not significant weight loss of 15# (9.1%) over the last 7 months likely related to increased nutrition needs and variable PO  Significant Weight Loss: No    Nutrition Focused Physical Exam Findings:    Subcutaneous Fat Loss:   Orbital Fat Pads: Well nourished (slightly bulging fat pads)  Buccal Fat Pads: Well nourished (full, rounded cheeks)  Triceps: Well nourished (ample fat tissue)  Muscle Wasting:  Temporalis: Well nourished (well-defined muscle)  Pectoralis (Clavicular Region): Well nourished (clavicle not visible)  Deltoid/Trapezius: Well nourished (rounded appearance at arm, shoulder, neck)  Edema:  Edema: none  Physical Findings:  Hair: Negative  Eyes: Negative  Mouth: Negative (missing/ broken teeth)  Nails: Negative  Skin: Positive (incision left knee)    Nutrition Significant Labs: Reviewed- noting H&H depleted with MCV@105 , glucose WNL,   CBC Trend:   Results from last 7 days   Lab Units 10/24/24  0516 10/23/24  0756 10/22/24  2307 10/20/24  1133   WBC AUTO x10*3/uL 5.1 5.7 6.9 6.0   RBC AUTO x10*6/uL 2.47* 2.40* 2.53* 2.71*   HEMOGLOBIN g/dL 8.3* 8.1* 8.7* 9.1*   HEMATOCRIT % 25.8* 25.5* 26.8* 27.7*   MCV fL 105* 106* 106* 102*   PLATELETS AUTO x10*3/uL 508* 462* 489* 354    , BMP Trend:   Results from last 7 days   Lab Units 10/24/24  0516 10/23/24  0756 10/22/24  2304 10/20/24  1133   GLUCOSE mg/dL 99 97 94 84   CALCIUM mg/dL 8.8 8.2* 9.1 9.0   SODIUM mmol/L 139 139 140 138   POTASSIUM mmol/L 4.0 3.9 4.0 3.9   CO2 mmol/L 26 28 28 29   CHLORIDE mmol/L 106 105 104 103   BUN mg/dL 11 13 16 10   CREATININE mg/dL 1.09 0.84 0.90 1.08    , A1C:No results found for: \"HGBA1C\", BG POCT trend:    , Lipid Panel: No results found for: \"CHOL\", \"HDL\", \"CHHDL\", \"LDLF\", \"VLDL\", \"TRIG\" , Vit D: No results found for: \"VITD25\" , Iron Panel: No results found for: \"IRON\", \"TIBC\", \"FERRITIN\"     Nutrition Specific Medications:  All med's reviewed calcium vitamin D 3, nystatin, vancomycin, Miralx    I/O:    " ";      Dietary Orders (From admission, onward)       Start     Ordered    10/23/24 1715  May Participate in Room Service  ( ROOM SERVICE MAY PARTICIPATE)  Once        Question:  .  Answer:  Yes    10/23/24 1714    10/23/24 0319  Adult diet Regular  Diet effective now        Question:  Diet type  Answer:  Regular    10/23/24 0318                     Estimated Needs:   Total Energy Estimated Needs (kCal): 2000 kCal  Method for Estimating Needs: 30-32 kcal/kg of ABW  Total Protein Estimated Needs (g): 82 g  Method for Estimating Needs: 1-1.2 gm/kg of ABW     Method for Estimating Needs: 1mL per kcal or per team        Nutrition Diagnosis   Malnutrition Diagnosis  Patient has Malnutrition Diagnosis: No    Nutrition Diagnosis  Patient has Nutrition Diagnosis: Yes  Diagnosis Status (1): New  Nutrition Diagnosis 1: Predicted inadequate energy intake  Related to (1): variable appetite, increased energy needs related to knee wound, infection and HIV  As Evidenced by (1): unintentional 15# weight loss over the last 7 mos.       Nutrition Interventions/Recommendations         Nutrition Prescription:  Individualized Nutrition Prescription Provided for : 5210-9903 kcal, 82gm PRO via PO        Nutrition Interventions:   Interventions: Meals and snacks, Medical food supplement, Vitamin supplement therapy  Goal: Continue adult diet regular as tolerated and per team  Medical Food Supplement: Commercial beverage  Goal: Pt would benefit from Ensure plus- 350 kcal, 13 gm PRO however declined at this time stated \" I will be riding it out\".   Can offer Ensure plus if Po intake is inadequate (<50% of meal)  Vitamin Supplement Therapy: D  Goal: Check vitamin D. Consider MVI with minerals      Nutrition Education: na         Nutrition Monitoring and Evaluation   Food/Nutrient Related History Monitoring  Monitoring and Evaluation Plan: Energy intake  Criteria: Meeting >75% energy needs via PO    Body Composition/Growth/Weight " History  Monitoring and Evaluation Plan: Weight  Criteria: No significant wt changes durign admission                   Time Spent (min): 60 minutes

## 2024-10-25 ENCOUNTER — APPOINTMENT (OUTPATIENT)
Dept: PRIMARY CARE | Facility: CLINIC | Age: 44
End: 2024-10-25
Payer: MEDICAID

## 2024-10-25 ENCOUNTER — PHARMACY VISIT (OUTPATIENT)
Dept: PHARMACY | Facility: CLINIC | Age: 44
End: 2024-10-25
Payer: COMMERCIAL

## 2024-10-25 VITALS
WEIGHT: 149.4 LBS | RESPIRATION RATE: 18 BRPM | DIASTOLIC BLOOD PRESSURE: 70 MMHG | HEART RATE: 86 BPM | SYSTOLIC BLOOD PRESSURE: 113 MMHG | OXYGEN SATURATION: 99 % | HEIGHT: 71 IN | TEMPERATURE: 99 F | BODY MASS INDEX: 20.92 KG/M2

## 2024-10-25 LAB
ALBUMIN SERPL BCP-MCNC: 2.4 G/DL (ref 3.4–5)
ANION GAP SERPL CALC-SCNC: 11 MMOL/L (ref 10–20)
BASOPHILS # BLD AUTO: 0.02 X10*3/UL (ref 0–0.1)
BASOPHILS NFR BLD AUTO: 0.4 %
BUN SERPL-MCNC: 10 MG/DL (ref 6–23)
CALCIUM SERPL-MCNC: 8.9 MG/DL (ref 8.6–10.6)
CHLORIDE SERPL-SCNC: 105 MMOL/L (ref 98–107)
CO2 SERPL-SCNC: 29 MMOL/L (ref 21–32)
CREAT SERPL-MCNC: 0.98 MG/DL (ref 0.5–1.3)
EGFRCR SERPLBLD CKD-EPI 2021: >90 ML/MIN/1.73M*2
EOSINOPHIL # BLD AUTO: 0.09 X10*3/UL (ref 0–0.7)
EOSINOPHIL NFR BLD AUTO: 1.7 %
ERYTHROCYTE [DISTWIDTH] IN BLOOD BY AUTOMATED COUNT: 13 % (ref 11.5–14.5)
GLUCOSE SERPL-MCNC: 84 MG/DL (ref 74–99)
HCT VFR BLD AUTO: 27.8 % (ref 41–52)
HGB BLD-MCNC: 8.9 G/DL (ref 13.5–17.5)
IMM GRANULOCYTES # BLD AUTO: 0.03 X10*3/UL (ref 0–0.7)
IMM GRANULOCYTES NFR BLD AUTO: 0.6 % (ref 0–0.9)
LYMPHOCYTES # BLD AUTO: 1.31 X10*3/UL (ref 1.2–4.8)
LYMPHOCYTES NFR BLD AUTO: 25 %
MAGNESIUM SERPL-MCNC: 1.67 MG/DL (ref 1.6–2.4)
MCH RBC QN AUTO: 33.7 PG (ref 26–34)
MCHC RBC AUTO-ENTMCNC: 32 G/DL (ref 32–36)
MCV RBC AUTO: 105 FL (ref 80–100)
MONOCYTES # BLD AUTO: 0.56 X10*3/UL (ref 0.1–1)
MONOCYTES NFR BLD AUTO: 10.7 %
NEUTROPHILS # BLD AUTO: 3.22 X10*3/UL (ref 1.2–7.7)
NEUTROPHILS NFR BLD AUTO: 61.6 %
NRBC BLD-RTO: 0 /100 WBCS (ref 0–0)
PHOSPHATE SERPL-MCNC: 3.4 MG/DL (ref 2.5–4.9)
PLATELET # BLD AUTO: 515 X10*3/UL (ref 150–450)
POTASSIUM SERPL-SCNC: 4.5 MMOL/L (ref 3.5–5.3)
RBC # BLD AUTO: 2.64 X10*6/UL (ref 4.5–5.9)
SODIUM SERPL-SCNC: 140 MMOL/L (ref 136–145)
VANCOMYCIN SERPL-MCNC: 16.4 UG/ML (ref 5–20)
WBC # BLD AUTO: 5.2 X10*3/UL (ref 4.4–11.3)

## 2024-10-25 PROCEDURE — 2500000004 HC RX 250 GENERAL PHARMACY W/ HCPCS (ALT 636 FOR OP/ED)

## 2024-10-25 PROCEDURE — 85025 COMPLETE CBC W/AUTO DIFF WBC: CPT

## 2024-10-25 PROCEDURE — 97165 OT EVAL LOW COMPLEX 30 MIN: CPT | Mod: GO

## 2024-10-25 PROCEDURE — 84100 ASSAY OF PHOSPHORUS: CPT

## 2024-10-25 PROCEDURE — RXMED WILLOW AMBULATORY MEDICATION CHARGE

## 2024-10-25 PROCEDURE — 2500000002 HC RX 250 W HCPCS SELF ADMINISTERED DRUGS (ALT 637 FOR MEDICARE OP, ALT 636 FOR OP/ED)

## 2024-10-25 PROCEDURE — 80202 ASSAY OF VANCOMYCIN: CPT

## 2024-10-25 PROCEDURE — 83735 ASSAY OF MAGNESIUM: CPT

## 2024-10-25 PROCEDURE — 99238 HOSP IP/OBS DSCHRG MGMT 30/<: CPT

## 2024-10-25 PROCEDURE — 36415 COLL VENOUS BLD VENIPUNCTURE: CPT

## 2024-10-25 PROCEDURE — 97161 PT EVAL LOW COMPLEX 20 MIN: CPT | Mod: GP

## 2024-10-25 PROCEDURE — 2500000001 HC RX 250 WO HCPCS SELF ADMINISTERED DRUGS (ALT 637 FOR MEDICARE OP)

## 2024-10-25 RX ORDER — PNV NO.95/FERROUS FUM/FOLIC AC 28MG-0.8MG
1 TABLET ORAL 2 TIMES DAILY
Qty: 72 TABLET | Refills: 0 | Status: SHIPPED | OUTPATIENT
Start: 2024-10-25 | End: 2024-11-30

## 2024-10-25 RX ORDER — CEFADROXIL 500 MG/1
1000 CAPSULE ORAL 2 TIMES DAILY
Qty: 56 CAPSULE | Refills: 0 | Status: SHIPPED | OUTPATIENT
Start: 2024-10-25 | End: 2024-11-08

## 2024-10-25 RX ORDER — SULFAMETHOXAZOLE AND TRIMETHOPRIM 800; 160 MG/1; MG/1
2 TABLET ORAL 3 TIMES WEEKLY
Qty: 24 TABLET | Refills: 0 | Status: SHIPPED | OUTPATIENT
Start: 2024-10-25 | End: 2024-11-24

## 2024-10-25 RX ORDER — SULFAMETHOXAZOLE AND TRIMETHOPRIM 800; 160 MG/1; MG/1
2 TABLET ORAL 2 TIMES DAILY
Status: DISCONTINUED | OUTPATIENT
Start: 2024-10-25 | End: 2024-10-25 | Stop reason: HOSPADM

## 2024-10-25 RX ORDER — ENOXAPARIN SODIUM 100 MG/ML
40 INJECTION SUBCUTANEOUS DAILY
Qty: 12 ML | Refills: 1 | Status: SHIPPED | OUTPATIENT
Start: 2024-10-26 | End: 2024-11-30

## 2024-10-25 RX ORDER — SULFAMETHOXAZOLE AND TRIMETHOPRIM 800; 160 MG/1; MG/1
2 TABLET ORAL 3 TIMES WEEKLY
Status: DISCONTINUED | OUTPATIENT
Start: 2024-10-28 | End: 2024-10-25

## 2024-10-25 RX ORDER — SULFAMETHOXAZOLE AND TRIMETHOPRIM 800; 160 MG/1; MG/1
2 TABLET ORAL 2 TIMES DAILY
Qty: 56 TABLET | Refills: 0 | Status: SHIPPED | OUTPATIENT
Start: 2024-10-25 | End: 2024-11-08

## 2024-10-25 ASSESSMENT — ACTIVITIES OF DAILY LIVING (ADL)
ADL_ASSISTANCE: INDEPENDENT
ADL_ASSISTANCE: INDEPENDENT

## 2024-10-25 ASSESSMENT — COGNITIVE AND FUNCTIONAL STATUS - GENERAL
MOBILITY SCORE: 20
STANDING UP FROM CHAIR USING ARMS: A LITTLE
MOBILITY SCORE: 19
WALKING IN HOSPITAL ROOM: A LITTLE
MOVING FROM LYING ON BACK TO SITTING ON SIDE OF FLAT BED WITH BEDRAILS: A LITTLE
MOVING TO AND FROM BED TO CHAIR: A LITTLE
TURNING FROM BACK TO SIDE WHILE IN FLAT BAD: A LITTLE
DAILY ACTIVITIY SCORE: 23
CLIMB 3 TO 5 STEPS WITH RAILING: A LITTLE
CLIMB 3 TO 5 STEPS WITH RAILING: A LOT
TOILETING: A LITTLE
DAILY ACTIVITIY SCORE: 22
WALKING IN HOSPITAL ROOM: A LITTLE
DRESSING REGULAR LOWER BODY CLOTHING: A LITTLE
HELP NEEDED FOR BATHING: A LITTLE

## 2024-10-25 ASSESSMENT — PAIN SCALES - WONG BAKER: WONGBAKER_NUMERICALRESPONSE: HURTS LITTLE BIT

## 2024-10-25 ASSESSMENT — PAIN - FUNCTIONAL ASSESSMENT
PAIN_FUNCTIONAL_ASSESSMENT: 0-10
PAIN_FUNCTIONAL_ASSESSMENT: 0-10

## 2024-10-25 NOTE — PROGRESS NOTES
Romaine Rodgers is a 44 y.o. male on day 2 of admission presenting with Encounter for postoperative wound check.    MONICA attempted to call Sondra ([facility] Prosecutor) at [phone] to discuss pt, but she did not answer. MONICA left a message stating pt is expected to dc home today 10/25/24.    MONICA left call back number if needed.    Ana Maria Graf MSW [facility]  Care Transitions  2  [phone] or Epic Secure Chat
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
## 2024-10-25 NOTE — HOSPITAL COURSE
Romaine Rodgers is a 44 y.o. male with PMH HIV (not on ART), bipolar, PTSD, polysubstance use disorder, recent L femoral intra-articular fx (s/p rIMN/ORIF 7/6), recent admission for L knee septic arthritis (s/p washout and hardware removal on 10/17) and PNA during which he left AMA and was not getting any abx as outpatient, presenting back to re-establish care for his septic arthritis.     Patient did agree to stay this admission. Per ID recommendations, was started on IV vancomycin and ceftriaxone to treat his septic arthritis as well as his presumed PNA based on positive strep pneumo antigen from previous admission.  Discussed the possibility of PICC line for this patient however patient is adamant against receiving a PICC line.  Upon discussion with infectious disease, they recommended a alternative p.o. regimen of cefadroxil 1000 mg p.o. 3 times daily as well as Bactrim 2 double strength twice daily for a 4-week course.  Patient will follow-up with his HIV care clinic at [facility] as he is already established there.  Once clearance was received by physical therapy, patient was discharged in stable condition.    He was also noted to have a protein gap with elevated A2G, GG, and M-protein for which he should see Hematology and Oncology outpatient. Referral made.

## 2024-10-25 NOTE — PROGRESS NOTES
Physical Therapy    Physical Therapy Evaluation    Patient Name: Romaine Rodgers  MRN: [id number]  Department: [facility] 55  Room: [facility]5514-A  Today's Date: 10/25/2024   Time Calculation  Start Time: 1108  Stop Time: 1120  Time Calculation (min): 12 min    Assessment/Plan   PT Assessment  PT Assessment Results: Decreased strength, Decreased range of motion, Decreased safety awareness  End of Session Communication: Physician  Assessment Comment: Demos decreased safety awareness as not wanting to use crutches, overall (S) for mobility at this time.  Pt not interested in further acute PT but would benefit from outpatient PT to make gains in strength and ROM.  End of Session Patient Position: Bed, 3 rail up, Alarm off, not on at start of session  IP OR SWING BED PT PLAN  Inpatient or Swing Bed: Inpatient  PT Plan  PT Plan: PT Eval only  PT Eval Only Reason: Patient/family refusal  PT Frequency: PT eval only  PT Discharge Recommendations: Low intensity level of continued care (outpatient therapy as deemed appropriate by medical team)  PT Recommended Transfer Status: Stand by assist  PT - OK to Discharge: Yes (Eval complete)    Subjective   General Visit Information:  General  Reason for Referral: (L) knee pain/septic arthritis, need for antibiotics  Past Medical History Relevant to Rehab: (L) femur IMN 7/2024, knee I&D and ISAK 10/17/24, HIV, polysubstance abuse  Missed Visit: Yes  Missed Visit Reason:  (@1455 pt declines OOB mobility at this time despite encouragement and education on benefits of mobility assessment.)  Co-Treatment: OT  Co-Treatment Reason: for pt compliance with mobility assessments  Prior to Session Communication: Bedside nurse  General Comment: Pt agreeable to participate with education, though pt impulsive and does not follow therapists' suggestions for safe mobiltiy.  Ambulates holding onto IV pole with 1-2 hands.  Not open to recommendations on AD use.  Reports is not interested in having PT follow  "while inpatient. Will discharge order.  Home Living:  Home Living  Type of Home: Apartment  Lives With: Alone  Home Adaptive Equipment: Crutches  Home Layout: One level  Home Access: Elevator  Prior Level of Function:  Prior Function Per Pt/Caregiver Report  ADL Assistance: Independent  Ambulatory Assistance:  (reports furniture walking at home, using crutches for outside of apt, denies falls  though admits he \"limps\")  Precautions:  Precautions  Hearing/Visual Limitations: WFL  LE Weight Bearing Status: Weight Bearing as Tolerated  Medical Precautions: Fall precautions            Objective   Pain:  Pain Assessment  Pain Assessment: 0-10  0-10 (Numeric) Pain Score:  (does not give # rating)  Cognition:  Cognition  Orientation Level: Oriented X4  Following Commands:  (does not follow cues for safety)  Insight: Moderate  Impulsive: Moderately    General Assessments:                    Coordination  Coordination Comment: limited ROM (L) knee        Static Standing Balance  Static Standing-Balance Support: No upper extremity supported  Static Standing-Level of Assistance: Close supervision  Dynamic Standing Balance  Dynamic Standing-Balance Support: Bilateral upper extremity supported  Dynamic Standing-Level of Assistance: Close supervision, Distant supervision  Dynamic Standing-Comments: IV pole  Functional Assessments:  Bed Mobility  Bed Mobility: Yes  Bed Mobility 1  Bed Mobility 1: Supine to sitting, Sitting to supine  Level of Assistance 1: Distant supervision, Modified independent  Bed Mobility Comments 1: HOB raised, will self assist (L) LE in and out of bed with hands prn    Transfers  Transfer: Yes  Transfer 1  Technique 1: Sit to stand, Stand to sit  Transfer Level of Assistance 1: Close supervision, Distant supervision  Trials/Comments 1: completes impulsively without AD present    Ambulation/Gait Training  Ambulation/Gait Training Performed: Yes  Ambulation/Gait Training 1  Surface 1: Level tile  Device 1: " IV Pole  Assistance 1: Close supervision, Distant supervision  Quality of Gait 1: Decreased step length, Antalgic, Forward flexed posture (will hold onto IV pole with 1-2 hands, will reach for railing in bernabe at times, step to gait pattern mostly demo'd with increased forward flexion at hips, declines to use crutches despite education on benefits of use related to pain.)  Comments/Distance (ft) 1: 150 ft  Extremity/Trunk Assessments:  RLE   RLE : Within Functional Limits  LLE   LLE : Exceptions to WFL  AROM LLE (degrees)  LLE AROM Comment: limited flexion at knee ~45  Strength LLE  LLE Overall Strength:  (grossly 3-/5 at hip flexion, 3-/5 at quad)  Outcome Measures:  [facility] Basic Mobility  Turning from your back to your side while in a flat bed without using bedrails: A little  Moving from lying on your back to sitting on the side of a flat bed without using bedrails: A little  Moving to and from bed to chair (including a wheelchair): None  Standing up from a chair using your arms (e.g. wheelchair or bedside chair): None  To walk in hospital room: A little  Climbing 3-5 steps with railing: A little  Basic Mobility - Total Score: 20         Education Documentation  Mobility Training, taught by Carmela Anand PT at 10/25/2024  1:41 PM.  Learner: Patient  Readiness: Nonacceptance  Method: Explanation  Response: No Evidence of Learning, Refused Teaching  Comment: attempted to educate on use of crutches, benefits of AD use however pt declines        10/25/24 at 1:43 PM - Carmela Anand, PT

## 2024-10-25 NOTE — DISCHARGE INSTRUCTIONS
Mr. Rodgers,    You were seen for the infection in your knee. You were treated with IV antibiotics while you were here and the original plan was to send you home with a PICC line, which is an IV line that stays in you short term so you can continue to get IV antibiotics. However since you did not feel comfortable with that, Infectious Disease recommended oral antibiotics for 4 weeks, which you will be sent home with a prescription for. Please follow up with your HIV clinic for further care of your knee.    Medication updates:   - Lovenox injections until 11/30   - Cefodroxil 1000mg BID x 14 days   - Bactrim take 2 tablets twice a day [id number] then resume your old dose   Please follow the medications as instructed.     Thank you    Care team.

## 2024-10-25 NOTE — DISCHARGE SUMMARY
Discharge Diagnosis  Encounter for postoperative wound check    Issues Requiring Follow-Up  Follow up with [facility] HIV clinic for further knee infection management    Discharge Meds     Medication List      START taking these medications     cefadroxil 500 mg capsule; Commonly known as: Duricef; Take 2 capsules   (1,000 mg) by mouth 2 times a day for 14 days.   enoxaparin 40 mg/0.4 mL syringe; Commonly known as: Lovenox; Inject 0.4   mL (40 mg) under the skin once daily.; Start taking on: October 26, 2024     CHANGE how you take these medications     Oyster Shell Calcium-Vit D3 500 mg-10 mcg (400 unit) tablet; Generic   drug: calcium carbonate-vitamin D3; Take 1 tablet by mouth 2 times a day.   Take medication TWICE DAILY for 6 weeks (end 11/30) then resume taking it   dailys (as prescribed); What changed: when to take this, additional   instructions   * sulfamethoxazole-trimethoprim 800-160 mg tablet; Commonly known as:   Bactrim DS; Take 2 tablets by mouth 3 times a week.; What changed: how   much to take, when to take this   * sulfamethoxazole-trimethoprim 800-160 mg tablet; Commonly known as:   Bactrim DS; Take 2 tablets by mouth 2 times a day for 14 days. After   finishing the 14 day course. Resume taking the Bactrim 3 times weekly.;   What changed: You were already taking a medication with the same name, and   this prescription was added. Make sure you understand how and when to take   each.  * This list has 2 medication(s) that are the same as other medications   prescribed for you. Read the directions carefully, and ask your doctor or   other care provider to review them with you.     CONTINUE taking these medications     Biktarvy -25 mg tablet; Generic drug: xakkdozjl-rbkrackf-wylgytn   ala; Take 1 tablet by mouth once daily.   gabapentin 300 mg capsule; Commonly known as: Neurontin; Take 1 capsule   (300 mg) by mouth every 8 hours for 14 days.   methocarbamol 750 mg tablet; Commonly known as:  Robaxin; Take 1 tablet   (750 mg) by mouth every 6 hours for 7 days.   nystatin 100,000 unit/mL suspension; Commonly known as: Mycostatin;   Swish and spit 5 mL (500,000 Units) 3 times a day for 16 doses.   OLANZapine 5 mg tablet; Commonly known as: ZyPREXA; Take 1 tablet (5 mg)   by mouth 2 times a day for 14 days.   traZODone 50 mg tablet; Commonly known as: Desyrel; Take 0.5 tablets (25   mg) by mouth as needed at bedtime for sleep (only if melatonin was taken   and not taking effect before midnight) for up to 14 days.   valproic acid 250 mg capsule; Commonly known as: Depakene; Take 2   capsules (500 mg) by mouth 2 times a day for 14 days.     ASK your doctor about these medications     nicotine 14 mg/24 hr patch; Commonly known as: Nicoderm CQ; Place 1   patch over 24 hours on the skin once daily.       Test Results Pending At Discharge  Pending Labs       Order Current Status    THC (Marijuana), Urine, Confirmation In process    Blood Culture Preliminary result    Blood Culture Preliminary result            Hospital Course  Romaine Rodgers is a 44 y.o. male with PMH HIV (not on ART), bipolar, PTSD, polysubstance use disorder, recent L femoral intra-articular fx (s/p rIMN/ORIF 7/6), recent admission for L knee septic arthritis (s/p washout and hardware removal on 10/17) and PNA during which he left AMA and was not getting any abx as outpatient, presenting back to re-establish care for his septic arthritis.     Patient did agree to stay this admission. Per ID recommendations, was started on IV vancomycin and ceftriaxone to treat his septic arthritis as well as his presumed PNA based on positive strep pneumo antigen from previous admission.  Discussed the possibility of PICC line for this patient however patient is adamant against receiving a PICC line.  Upon discussion with infectious disease, they recommended a alternative p.o. regimen of cefadroxil 1000 mg p.o. 3 times daily as well as Bactrim 2 double  strength twice daily for a 4-week course.  Patient will follow-up with his HIV care clinic at [facility] as he is already established there.  Once clearance was received by physical therapy, patient was discharged in stable condition.    He was also noted to have a protein gap with elevated A2G, GG, and M-protein for which he should see Hematology and Oncology outpatient. Referral made.    Pertinent Physical Exam At Time of Discharge  Physical Exam  Constitutional:       General: He is not in acute distress.  HENT:      Head: Normocephalic and atraumatic.   Eyes:      General: No scleral icterus.     Extraocular Movements: Extraocular movements intact.   Cardiovascular:      Rate and Rhythm: Normal rate.   Pulmonary:      Effort: Pulmonary effort is normal.      Breath sounds: Normal breath sounds. No wheezing, rhonchi or rales.   Abdominal:      Palpations: Abdomen is soft.      Tenderness: There is no abdominal tenderness.   Musculoskeletal:         General: Tenderness and signs of injury present. No swelling. Normal range of motion.      Comments: Wound of left knee clean, dry, intact   Skin:     General: Skin is warm and dry.   Neurological:      General: No focal deficit present.      Mental Status: He is alert.   Psychiatric:         Mood and Affect: Mood normal.         Thought Content: Thought content normal.         Outpatient Follow-Up  Future Appointments   Date Time Provider Department Center   11/5/2024  1:00 PM Seema Earl PA-C [id number] Academic   12/27/2024  8:45 AM Rickey Tyler MD [id number] Academic         Bryon Enriquez DO

## 2024-10-25 NOTE — CARE PLAN
The patient's goals for the shift include      The clinical goals for the shift include Pt will remai safe and free from falls and injury through shift    Over the shift, the patient did not make progress toward the following goals. Barriers to progression include impaired gait. Recommendations to address these barriers include instruct pt to call for assistance prior to getting out of bed.
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
## 2024-10-25 NOTE — PROGRESS NOTES
Vancomycin Dosing by Pharmacy- FOLLOW UP    Romaine Rodgers is a 44 y.o. year old male who Pharmacy has been consulted for vancomycin dosing for osteomyelitis/septic arthritis. Based on the patient's indication and renal status this patient is being dosed based on a goal AUC of 400-600.     Renal function is currently stable.    Current vancomycin dose: 1250 mg given every 12 hours    Estimated vancomycin AUC on current dose: 500 mg/L.hr     Visit Vitals  /70 (BP Location: Right arm, Patient Position: Lying)   Pulse 86   Temp 37.2 °C (99 °F) (Temporal)   Resp 18        Lab Results   Component Value Date    CREATININE 0.98 10/25/2024    CREATININE 1.09 10/24/2024    CREATININE 0.84 10/23/2024    CREATININE 0.90 10/22/2024        Patient weight is as follows:   Vitals:    10/23/24 1703   Weight: 67.8 kg (149 lb 6.4 oz)       Cultures:  No results found for the encounter in last 14 days.       I/O last 3 completed shifts:  In:  (11.5 mL/kg) [P.O.:480; IV Piggyback:300]  Out:  (29.9 mL/kg) [Urine: (0.8 mL/kg/hr)]  Weight: 67.8 kg   I/O during current shift:  No intake/output data recorded.    Temp (24hrs), Av.1 °C (98.8 °F), Min:36.8 °C (98.2 °F), Max:37.4 °C (99.3 °F)      Assessment/Plan    Within goal AUC range. Continue current vancomycin regimen.    This dosing regimen is predicted by InsightRx to result in the following pharmacokinetic parameters:  [id number]: 503 mg/L.hr  AUC24,ss: 500 mg/L.hr  Probability of AUC24 > 400: 97 %  Ctrough,ss: 13 mg/L  Probability of Ctrough,ss > 20: 0 %    The next level will be obtained on 10/27 at 0500. May be obtained sooner if clinically indicated.   Will continue to monitor renal function daily while on vancomycin and order serum creatinine at least every 48 hours if not already ordered.  Follow for continued vancomycin needs, clinical response, and signs/symptoms of toxicity.       JAIME CHANG, PharmD

## 2024-10-26 LAB — CARBOXYTHC UR-MCNC: >500 NG/ML

## 2024-10-27 LAB
BACTERIA BLD CULT: NORMAL
BACTERIA BLD CULT: NORMAL

## 2024-10-28 LAB
FUNGUS SPEC CULT: NORMAL
FUNGUS SPEC FUNGUS STN: NORMAL

## 2024-11-04 LAB
FUNGUS SPEC CULT: NORMAL
FUNGUS SPEC FUNGUS STN: NORMAL

## 2024-11-04 NOTE — DOCUMENTATION CLARIFICATION NOTE
"    PATIENT:               AMADOR THOMAS  ACCT #:                  [id number]  MRN:                       [id number]  :                       1980  ADMIT DATE:       10/22/2024 8:45 PM  DISCH DATE:        10/25/2024 6:58 PM  RESPONDING PROVIDER #:        [id number]          PROVIDER RESPONSE TEXT:    HIV disease - AIDS    CDI QUERY TEXT:    Clarification    Instruction:    Based on your assessment of the patient and the clinical information, please provide the requested documentation by clicking on the appropriate radio button and enter any additional information if prompted.    Question: Please further clarify HIV as    When answering this query, please exercise your independent professional judgment. The fact that a question is being asked, does not imply that any particular answer is desired or expected.    The patient's clinical indicators include:  Clinical Information: 44 YOM presenting back to re-establish care for his septic arthritis.    10/23/24 H&P: \"He has a past medical history of HIV disease (Multi).\"    Clinical Indicators: 10/14/24:  CD3 and CD4 Absolute: 179, HIV-1 RNA Viral Load: 19,000, HIV RNA Result: Detected, Oral thrush    Treatment: wodinsivj-jzhtpurh-fleruzv ala -25 mg to be started, bactrim prophylaxis, Nystatin swish and spit for 7 days (10/19-10/25)    Risk Factors: polysubstance use disorder  Options provided:  -- HIV positive status only  -- HIV disease - AIDS  -- Other - I will add my own diagnosis  -- Refer to Clinical Documentation Reviewer    Query created by: Mela Marsh on 2024 11:51 AM      Electronically signed by:  DARRION GONZALEZ DO 2024 12:54 PM          "

## 2024-11-05 ENCOUNTER — HOSPITAL ENCOUNTER (OUTPATIENT)
Dept: RADIOLOGY | Facility: HOSPITAL | Age: 44
Discharge: HOME | End: 2024-11-05
Payer: COMMERCIAL

## 2024-11-05 ENCOUNTER — OFFICE VISIT (OUTPATIENT)
Dept: ORTHOPEDIC SURGERY | Facility: HOSPITAL | Age: 44
End: 2024-11-05
Payer: COMMERCIAL

## 2024-11-05 DIAGNOSIS — S72.492B: ICD-10-CM

## 2024-11-05 DIAGNOSIS — S72.492B: Primary | ICD-10-CM

## 2024-11-05 PROCEDURE — 99211 OFF/OP EST MAY X REQ PHY/QHP: CPT

## 2024-11-05 PROCEDURE — 73560 X-RAY EXAM OF KNEE 1 OR 2: CPT | Mod: LEFT SIDE | Performed by: RADIOLOGY

## 2024-11-05 PROCEDURE — 73552 X-RAY EXAM OF FEMUR 2/>: CPT | Mod: LT

## 2024-11-05 PROCEDURE — 73560 X-RAY EXAM OF KNEE 1 OR 2: CPT | Mod: LT

## 2024-11-05 PROCEDURE — 73552 X-RAY EXAM OF FEMUR 2/>: CPT | Mod: LEFT SIDE | Performed by: RADIOLOGY

## 2024-11-05 RX ORDER — OXYCODONE HYDROCHLORIDE 5 MG/1
TABLET ORAL
COMMUNITY
Start: 2024-07-30 | End: 2024-11-05 | Stop reason: SDUPTHER

## 2024-11-05 RX ORDER — FLUCONAZOLE 100 MG/1
100 TABLET ORAL
COMMUNITY
Start: 2023-12-21 | End: 2024-12-20

## 2024-11-05 RX ORDER — OXYCODONE HYDROCHLORIDE 5 MG/1
5 TABLET ORAL EVERY 6 HOURS PRN
Qty: 28 TABLET | Refills: 0 | Status: SHIPPED | OUTPATIENT
Start: 2024-11-05 | End: 2024-11-12

## 2024-11-05 RX ORDER — NYSTATIN 100000 [USP'U]/ML
500000 SUSPENSION ORAL 4 TIMES DAILY
COMMUNITY
Start: 2024-03-21

## 2024-11-05 ASSESSMENT — PAIN SCALES - GENERAL: PAINLEVEL_OUTOF10: 10 - WORST POSSIBLE PAIN

## 2024-11-05 ASSESSMENT — PAIN - FUNCTIONAL ASSESSMENT: PAIN_FUNCTIONAL_ASSESSMENT: 0-10

## 2024-11-05 NOTE — PROGRESS NOTES
Subjective    Patient ID: Romaine Rodgers is a 44 y.o. male.    Chief Complaint: Post-op of the Left Leg (L femur)     Last Surgery: removal of left femur hardware, saucerization of left femur, and left knee I&D  Last Surgery Date: 10/17    HPI  Patient is a 44 y.o. male who is s/p ORIF and IMN of left femur by Dr. Tyler on 7/6 with subsequent removal of left femur hardware, saucerization of left femur, and left knee I&D by Dr. Chavez on 10/17. Patient continues to be weight bearing as tolerated on the left leg with crutches at this time and denies issues with incision. Patient left hospital AMA and did not receive picc line, but is a poor historian and could not tell me if he is taking oral antibiotics. Needs refill on pain medication. Not in physical therapy at this time. Patient denies fever or chills, N/T or calf pain.     ROS: All other systems have been reviewed and are negative except as previously noted in history of present illness.      IMP:  Problem List Items Addressed This Visit       Open comminuted intra-articular fracture of distal femur, left, type I or II, initial encounter (Multi) - Primary    Relevant Medications    oxyCODONE (Roxicodone) 5 mg immediate release tablet    Other Relevant Orders    XR femur left 2+ views    XR knee left 1-2 views    Referral to Physical Therapy     Objective   General: Alert and oriented x 3, NAD, respirations easy and unlabored with no audible wheezes, skin warm and dry, speech and dress appropriate for noted age, affect euthymic.     Musculoskeletal: Left Lower Extremity  incisions c/d/i  mild swelling to lower leg  compartments soft  no calf tenderness  sensation intact to light touch  motor intact including TA/GS/EHL  palpable DP/PT pulses 2+     X-ray: Images of left knee and femur reviewed personally by me today and reveal interval change of femur intramedullary nail and screw removal with screw tracks present.       Assessment/Plan   Encounter  Diagnoses:  Open comminuted intra-articular fracture of distal femur, left, type I or II, initial encounter (Multi)    PLAN: Patient is s/p ORIF and IMN of left femur by Dr. Tyler on 7/6 with subsequent removal of left femur hardware, saucerization of left femur, and left knee I&D by Dr. Chavez on 10/17. Staples were removed at this visit. Patient continues to be weight bearing as tolerated on the left leg with crutches at this time and denies issues with incision. Patient left hospital AMA and did not receive picc line, but is a poor historian and could not tell me if he is taking oral antibiotics. Needs refill on pain medication. Not in physical therapy at this time. Imaging reveals interval change of left femur intramedullary nail and screw removal with screw tracks present. Patient is educated that if he has his oral antibiotics that he needs to take the antibiotics as prescribed and for full duration. He was told the signs and symptoms of infection and should call the office or go to the ED if anything changes. He should be weight bearing as tolerated. He is given a referral for PT to work on knee ROM, quad strengthening, gait training, and weight bearing. Patient is given a refill of oxycodone. He is educated that we will only refill pain medication up to 6 weeks from his surgery and will only be 7 day supplies. Patient understands. Patient will follow up in 3 months. Patient is in agreement with this plan. Xrays of the left knee and femur will be needed.     Orders Placed This Encounter    XR femur left 2+ views    XR knee left 1-2 views    Referral to Physical Therapy    oxyCODONE (Roxicodone) 5 mg immediate release tablet     No follow-ups on file.

## 2024-12-27 ENCOUNTER — HOSPITAL ENCOUNTER (OUTPATIENT)
Dept: RADIOLOGY | Facility: HOSPITAL | Age: 44
Discharge: HOME | End: 2024-12-27
Payer: COMMERCIAL

## 2024-12-27 ENCOUNTER — OFFICE VISIT (OUTPATIENT)
Dept: ORTHOPEDIC SURGERY | Facility: HOSPITAL | Age: 44
End: 2024-12-27
Payer: COMMERCIAL

## 2024-12-27 DIAGNOSIS — S72.492B: Primary | ICD-10-CM

## 2024-12-27 DIAGNOSIS — S72.492B: ICD-10-CM

## 2024-12-27 PROCEDURE — 99211 OFF/OP EST MAY X REQ PHY/QHP: CPT | Performed by: PHYSICIAN ASSISTANT

## 2024-12-27 PROCEDURE — 73560 X-RAY EXAM OF KNEE 1 OR 2: CPT | Mod: LT

## 2024-12-27 PROCEDURE — 73552 X-RAY EXAM OF FEMUR 2/>: CPT | Mod: LT

## 2024-12-27 RX ORDER — DOCUSATE SODIUM 100 MG/1
100 CAPSULE, LIQUID FILLED ORAL 2 TIMES DAILY
COMMUNITY
Start: 2024-12-13

## 2024-12-27 RX ORDER — OXYCODONE HYDROCHLORIDE 5 MG/1
TABLET ORAL
COMMUNITY
Start: 2024-11-05

## 2024-12-27 RX ORDER — CALCIUM CARB/VITAMIN D3/VIT K1 500-500-40
1 TABLET,CHEWABLE ORAL
COMMUNITY
Start: 2024-11-12

## 2024-12-27 ASSESSMENT — PAIN - FUNCTIONAL ASSESSMENT: PAIN_FUNCTIONAL_ASSESSMENT: 0-10

## 2024-12-27 ASSESSMENT — PAIN DESCRIPTION - DESCRIPTORS: DESCRIPTORS: THROBBING

## 2024-12-27 ASSESSMENT — PAIN SCALES - GENERAL: PAINLEVEL_OUTOF10: 5 - MODERATE PAIN
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
## 2025-01-03 ENCOUNTER — EVALUATION (OUTPATIENT)
Dept: PHYSICAL THERAPY | Facility: HOSPITAL | Age: 45
End: 2025-01-03
Payer: COMMERCIAL

## 2025-01-03 DIAGNOSIS — S72.492B: ICD-10-CM

## 2025-01-03 DIAGNOSIS — S81.032A GUNSHOT WOUND OF LEFT KNEE, INITIAL ENCOUNTER: Primary | ICD-10-CM

## 2025-01-03 PROCEDURE — 97110 THERAPEUTIC EXERCISES: CPT | Mod: GP | Performed by: PHYSICAL THERAPIST

## 2025-01-03 PROCEDURE — 97161 PT EVAL LOW COMPLEX 20 MIN: CPT | Mod: GP | Performed by: PHYSICAL THERAPIST

## 2025-01-03 ASSESSMENT — ENCOUNTER SYMPTOMS
DEPRESSION: 0
OCCASIONAL FEELINGS OF UNSTEADINESS: 1
LOSS OF SENSATION IN FEET: 0
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
## 2025-01-13 ENCOUNTER — TREATMENT (OUTPATIENT)
Dept: PHYSICAL THERAPY | Facility: HOSPITAL | Age: 45
End: 2025-01-13
Payer: COMMERCIAL

## 2025-01-13 DIAGNOSIS — S81.032A GUNSHOT WOUND OF LEFT KNEE, INITIAL ENCOUNTER: ICD-10-CM

## 2025-01-13 DIAGNOSIS — S72.492B: ICD-10-CM

## 2025-01-13 PROCEDURE — 97110 THERAPEUTIC EXERCISES: CPT | Mod: GP,CQ
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
## 2025-01-21 ENCOUNTER — OFFICE VISIT (OUTPATIENT)
Dept: ORTHOPEDIC SURGERY | Facility: HOSPITAL | Age: 45
End: 2025-01-21
Payer: COMMERCIAL

## 2025-01-21 DIAGNOSIS — M00.862 ARTHRITIS OF LEFT KNEE DUE TO OTHER BACTERIA: ICD-10-CM

## 2025-01-21 DIAGNOSIS — S72.492B: Primary | ICD-10-CM

## 2025-01-21 DIAGNOSIS — M86.252: ICD-10-CM

## 2025-01-21 PROCEDURE — 99214 OFFICE O/P EST MOD 30 MIN: CPT | Performed by: ORTHOPAEDIC SURGERY
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
## 2025-01-22 ENCOUNTER — APPOINTMENT (OUTPATIENT)
Dept: PHYSICAL THERAPY | Facility: HOSPITAL | Age: 45
End: 2025-01-22
Payer: COMMERCIAL

## 2025-01-29 ENCOUNTER — TREATMENT (OUTPATIENT)
Dept: PHYSICAL THERAPY | Facility: HOSPITAL | Age: 45
End: 2025-01-29
Payer: COMMERCIAL

## 2025-01-29 DIAGNOSIS — S81.032A GUNSHOT WOUND OF LEFT KNEE, INITIAL ENCOUNTER: ICD-10-CM

## 2025-01-29 DIAGNOSIS — S72.492B: ICD-10-CM
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
## 2025-02-05 ENCOUNTER — TREATMENT (OUTPATIENT)
Dept: PHYSICAL THERAPY | Facility: HOSPITAL | Age: 45
End: 2025-02-05
Payer: COMMERCIAL

## 2025-02-05 DIAGNOSIS — S81.032A GUNSHOT WOUND OF LEFT KNEE, INITIAL ENCOUNTER: ICD-10-CM

## 2025-02-05 DIAGNOSIS — S72.492B: ICD-10-CM

## 2025-02-05 PROCEDURE — 97140 MANUAL THERAPY 1/> REGIONS: CPT | Mod: GP,CQ

## 2025-02-05 PROCEDURE — 97110 THERAPEUTIC EXERCISES: CPT | Mod: GP,CQ
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
## 2025-02-07 ENCOUNTER — APPOINTMENT (OUTPATIENT)
Dept: PHYSICAL THERAPY | Facility: HOSPITAL | Age: 45
End: 2025-02-07
Payer: COMMERCIAL

## 2025-02-12 ENCOUNTER — APPOINTMENT (OUTPATIENT)
Dept: PHYSICAL THERAPY | Facility: HOSPITAL | Age: 45
End: 2025-02-12
Payer: COMMERCIAL

## 2025-02-12 DIAGNOSIS — S72.492B: ICD-10-CM

## 2025-02-12 DIAGNOSIS — S81.032A GUNSHOT WOUND OF LEFT KNEE, INITIAL ENCOUNTER: ICD-10-CM

## 2025-02-12 PROCEDURE — 97110 THERAPEUTIC EXERCISES: CPT | Mod: GP,CQ
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
## 2025-02-26 ENCOUNTER — TREATMENT (OUTPATIENT)
Dept: PHYSICAL THERAPY | Facility: HOSPITAL | Age: 45
End: 2025-02-26
Payer: COMMERCIAL

## 2025-02-26 DIAGNOSIS — S81.032A GUNSHOT WOUND OF LEFT KNEE, INITIAL ENCOUNTER: ICD-10-CM

## 2025-02-26 DIAGNOSIS — S72.492B: ICD-10-CM

## 2025-02-26 PROCEDURE — 97110 THERAPEUTIC EXERCISES: CPT | Mod: GP,CQ
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
## 2025-03-03 ENCOUNTER — APPOINTMENT (OUTPATIENT)
Dept: RADIOLOGY | Facility: HOSPITAL | Age: 45
End: 2025-03-03
Payer: COMMERCIAL

## 2025-03-03 ENCOUNTER — CLINICAL SUPPORT (OUTPATIENT)
Dept: EMERGENCY MEDICINE | Facility: HOSPITAL | Age: 45
End: 2025-03-03
Payer: COMMERCIAL

## 2025-03-03 ENCOUNTER — HOSPITAL ENCOUNTER (OUTPATIENT)
Facility: HOSPITAL | Age: 45
Setting detail: OBSERVATION
Discharge: HOME | End: 2025-03-04
Attending: EMERGENCY MEDICINE | Admitting: NURSE PRACTITIONER
Payer: COMMERCIAL

## 2025-03-03 DIAGNOSIS — L08.9 BITE WOUND OF LEFT HAND WITH INFECTION, INITIAL ENCOUNTER: ICD-10-CM

## 2025-03-03 DIAGNOSIS — L03.119 CELLULITIS OF HAND: Primary | ICD-10-CM

## 2025-03-03 DIAGNOSIS — S61.452A BITE WOUND OF LEFT HAND WITH INFECTION, INITIAL ENCOUNTER: ICD-10-CM

## 2025-03-03 PROBLEM — J18.9 PNEUMONIA DUE TO INFECTIOUS ORGANISM, UNSPECIFIED LATERALITY, UNSPECIFIED PART OF LUNG: Status: RESOLVED | Noted: 2024-10-14 | Resolved: 2025-03-03

## 2025-03-03 PROBLEM — R77.8 ABNORMAL SPEP: Status: ACTIVE | Noted: 2024-12-18

## 2025-03-03 PROBLEM — R19.7 DIARRHEA OF PRESUMED INFECTIOUS ORIGIN: Status: RESOLVED | Noted: 2024-10-14 | Resolved: 2025-03-03

## 2025-03-03 PROBLEM — B37.0 ORAL THRUSH: Status: RESOLVED | Noted: 2024-10-20 | Resolved: 2025-03-03

## 2025-03-03 PROBLEM — E87.6 HYPOKALEMIA: Status: RESOLVED | Noted: 2024-10-14 | Resolved: 2025-03-03

## 2025-03-03 PROBLEM — N17.9 AKI (ACUTE KIDNEY INJURY) (CMS-HCC): Status: RESOLVED | Noted: 2024-10-14 | Resolved: 2025-03-03

## 2025-03-03 PROBLEM — T81.40XS INFECTION FOLLOWING A PROCEDURE, UNSPECIFIED, SEQUELA: Status: RESOLVED | Noted: 2024-10-14 | Resolved: 2025-03-03

## 2025-03-03 PROBLEM — Z48.89 ENCOUNTER FOR POSTOPERATIVE WOUND CHECK: Status: RESOLVED | Noted: 2024-10-23 | Resolved: 2025-03-03

## 2025-03-03 LAB
ABO GROUP (TYPE) IN BLOOD: NORMAL
ALBUMIN SERPL BCP-MCNC: 3.6 G/DL (ref 3.4–5)
ALP SERPL-CCNC: 86 U/L (ref 33–120)
ALT SERPL W P-5'-P-CCNC: 12 U/L (ref 10–52)
ANION GAP SERPL CALC-SCNC: 13 MMOL/L (ref 10–20)
ANTIBODY SCREEN: NORMAL
APTT PPP: 27 SECONDS (ref 26–36)
AST SERPL W P-5'-P-CCNC: 28 U/L (ref 9–39)
BASOPHILS # BLD AUTO: 0.01 X10*3/UL (ref 0–0.1)
BASOPHILS # BLD AUTO: 0.01 X10*3/UL (ref 0–0.1)
BASOPHILS NFR BLD AUTO: 0.1 %
BASOPHILS NFR BLD AUTO: 0.2 %
BILIRUB SERPL-MCNC: 0.8 MG/DL (ref 0–1.2)
BUN SERPL-MCNC: 12 MG/DL (ref 6–23)
CALCIUM SERPL-MCNC: 9.5 MG/DL (ref 8.6–10.6)
CHLORIDE SERPL-SCNC: 101 MMOL/L (ref 98–107)
CO2 SERPL-SCNC: 23 MMOL/L (ref 21–32)
CREAT SERPL-MCNC: 0.94 MG/DL (ref 0.5–1.3)
CRP SERPL-MCNC: 4.86 MG/DL
EGFRCR SERPLBLD CKD-EPI 2021: >90 ML/MIN/1.73M*2
EOSINOPHIL # BLD AUTO: 0 X10*3/UL (ref 0–0.7)
EOSINOPHIL # BLD AUTO: 0 X10*3/UL (ref 0–0.7)
EOSINOPHIL NFR BLD AUTO: 0 %
EOSINOPHIL NFR BLD AUTO: 0 %
ERYTHROCYTE [DISTWIDTH] IN BLOOD BY AUTOMATED COUNT: 13.1 % (ref 11.5–14.5)
ERYTHROCYTE [DISTWIDTH] IN BLOOD BY AUTOMATED COUNT: 13.1 % (ref 11.5–14.5)
ERYTHROCYTE [SEDIMENTATION RATE] IN BLOOD BY WESTERGREN METHOD: 64 MM/H (ref 0–15)
GLUCOSE SERPL-MCNC: 108 MG/DL (ref 74–99)
HCT VFR BLD AUTO: 38.7 % (ref 41–52)
HCT VFR BLD AUTO: 38.9 % (ref 41–52)
HGB BLD-MCNC: 13.7 G/DL (ref 13.5–17.5)
HGB BLD-MCNC: 13.8 G/DL (ref 13.5–17.5)
IMM GRANULOCYTES # BLD AUTO: 0.02 X10*3/UL (ref 0–0.7)
IMM GRANULOCYTES # BLD AUTO: 0.03 X10*3/UL (ref 0–0.7)
IMM GRANULOCYTES NFR BLD AUTO: 0.3 % (ref 0–0.9)
IMM GRANULOCYTES NFR BLD AUTO: 0.4 % (ref 0–0.9)
INR PPP: 1.2 (ref 0.9–1.1)
LYMPHOCYTES # BLD AUTO: 0.78 X10*3/UL (ref 1.2–4.8)
LYMPHOCYTES # BLD AUTO: 0.8 X10*3/UL (ref 1.2–4.8)
LYMPHOCYTES NFR BLD AUTO: 12 %
LYMPHOCYTES NFR BLD AUTO: 12.1 %
MCH RBC QN AUTO: 33.5 PG (ref 26–34)
MCH RBC QN AUTO: 33.7 PG (ref 26–34)
MCHC RBC AUTO-ENTMCNC: 35.4 G/DL (ref 32–36)
MCHC RBC AUTO-ENTMCNC: 35.5 G/DL (ref 32–36)
MCV RBC AUTO: 94 FL (ref 80–100)
MCV RBC AUTO: 95 FL (ref 80–100)
MONOCYTES # BLD AUTO: 0.52 X10*3/UL (ref 0.1–1)
MONOCYTES # BLD AUTO: 0.54 X10*3/UL (ref 0.1–1)
MONOCYTES NFR BLD AUTO: 8.1 %
MONOCYTES NFR BLD AUTO: 8.1 %
NEUTROPHILS # BLD AUTO: 5.1 X10*3/UL (ref 1.2–7.7)
NEUTROPHILS # BLD AUTO: 5.31 X10*3/UL (ref 1.2–7.7)
NEUTROPHILS NFR BLD AUTO: 79.3 %
NEUTROPHILS NFR BLD AUTO: 79.4 %
NRBC BLD-RTO: 0 /100 WBCS (ref 0–0)
NRBC BLD-RTO: 0 /100 WBCS (ref 0–0)
PLATELET # BLD AUTO: 173 X10*3/UL (ref 150–450)
PLATELET # BLD AUTO: 181 X10*3/UL (ref 150–450)
POTASSIUM SERPL-SCNC: 4.6 MMOL/L (ref 3.5–5.3)
PROT SERPL-MCNC: 9.6 G/DL (ref 6.4–8.2)
PROTHROMBIN TIME: 13.3 SECONDS (ref 9.8–12.4)
RBC # BLD AUTO: 4.07 X10*6/UL (ref 4.5–5.9)
RBC # BLD AUTO: 4.12 X10*6/UL (ref 4.5–5.9)
RH FACTOR (ANTIGEN D): NORMAL
SODIUM SERPL-SCNC: 132 MMOL/L (ref 136–145)
WBC # BLD AUTO: 6.4 X10*3/UL (ref 4.4–11.3)
WBC # BLD AUTO: 6.7 X10*3/UL (ref 4.4–11.3)

## 2025-03-03 PROCEDURE — 99285 EMERGENCY DEPT VISIT HI MDM: CPT | Performed by: EMERGENCY MEDICINE

## 2025-03-03 PROCEDURE — 96367 TX/PROPH/DG ADDL SEQ IV INF: CPT

## 2025-03-03 PROCEDURE — 85025 COMPLETE CBC W/AUTO DIFF WBC: CPT

## 2025-03-03 PROCEDURE — G0378 HOSPITAL OBSERVATION PER HR: HCPCS

## 2025-03-03 PROCEDURE — 93005 ELECTROCARDIOGRAM TRACING: CPT

## 2025-03-03 PROCEDURE — 96375 TX/PRO/DX INJ NEW DRUG ADDON: CPT

## 2025-03-03 PROCEDURE — 73130 X-RAY EXAM OF HAND: CPT | Mod: LEFT SIDE | Performed by: RADIOLOGY

## 2025-03-03 PROCEDURE — 85610 PROTHROMBIN TIME: CPT

## 2025-03-03 PROCEDURE — 96365 THER/PROPH/DIAG IV INF INIT: CPT

## 2025-03-03 PROCEDURE — 2500000004 HC RX 250 GENERAL PHARMACY W/ HCPCS (ALT 636 FOR OP/ED)

## 2025-03-03 PROCEDURE — 96366 THER/PROPH/DIAG IV INF ADDON: CPT

## 2025-03-03 PROCEDURE — 36415 COLL VENOUS BLD VENIPUNCTURE: CPT

## 2025-03-03 PROCEDURE — 85652 RBC SED RATE AUTOMATED: CPT

## 2025-03-03 PROCEDURE — 86901 BLOOD TYPING SEROLOGIC RH(D): CPT

## 2025-03-03 PROCEDURE — 84075 ASSAY ALKALINE PHOSPHATASE: CPT

## 2025-03-03 PROCEDURE — 99223 1ST HOSP IP/OBS HIGH 75: CPT | Performed by: NURSE PRACTITIONER

## 2025-03-03 PROCEDURE — RXMED WILLOW AMBULATORY MEDICATION CHARGE

## 2025-03-03 PROCEDURE — 87040 BLOOD CULTURE FOR BACTERIA: CPT

## 2025-03-03 PROCEDURE — 86140 C-REACTIVE PROTEIN: CPT

## 2025-03-03 PROCEDURE — 73130 X-RAY EXAM OF HAND: CPT | Mod: LT

## 2025-03-03 PROCEDURE — 2500000001 HC RX 250 WO HCPCS SELF ADMINISTERED DRUGS (ALT 637 FOR MEDICARE OP): Performed by: NURSE PRACTITIONER

## 2025-03-03 RX ORDER — VANCOMYCIN HYDROCHLORIDE 1 G/20ML
INJECTION, POWDER, LYOPHILIZED, FOR SOLUTION INTRAVENOUS DAILY PRN
Status: DISCONTINUED | OUTPATIENT
Start: 2025-03-03 | End: 2025-03-04

## 2025-03-03 RX ORDER — AMOXICILLIN AND CLAVULANATE POTASSIUM 875; 125 MG/1; MG/1
875 TABLET, FILM COATED ORAL 2 TIMES DAILY
Qty: 20 TABLET | Refills: 0 | Status: SHIPPED | OUTPATIENT
Start: 2025-03-03 | End: 2025-03-14

## 2025-03-03 RX ORDER — AMOXICILLIN 250 MG
2 CAPSULE ORAL 2 TIMES DAILY
Status: DISCONTINUED | OUTPATIENT
Start: 2025-03-03 | End: 2025-03-04 | Stop reason: HOSPADM

## 2025-03-03 RX ORDER — ACETAMINOPHEN 325 MG/1
975 TABLET ORAL EVERY 6 HOURS
Status: DISCONTINUED | OUTPATIENT
Start: 2025-03-03 | End: 2025-03-04 | Stop reason: HOSPADM

## 2025-03-03 RX ORDER — VANCOMYCIN HYDROCHLORIDE 750 MG/150ML
750 INJECTION, SOLUTION INTRAVENOUS EVERY 8 HOURS
Status: DISCONTINUED | OUTPATIENT
Start: 2025-03-03 | End: 2025-03-04

## 2025-03-03 RX ORDER — KETOROLAC TROMETHAMINE 15 MG/ML
15 INJECTION, SOLUTION INTRAMUSCULAR; INTRAVENOUS EVERY 6 HOURS PRN
Status: DISCONTINUED | OUTPATIENT
Start: 2025-03-03 | End: 2025-03-04 | Stop reason: HOSPADM

## 2025-03-03 RX ORDER — CEFTRIAXONE 2 G/50ML
2 INJECTION, SOLUTION INTRAVENOUS ONCE
Status: COMPLETED | OUTPATIENT
Start: 2025-03-03 | End: 2025-03-03

## 2025-03-03 RX ORDER — VANCOMYCIN HYDROCHLORIDE 1 G/200ML
1000 INJECTION, SOLUTION INTRAVENOUS ONCE
Status: COMPLETED | OUTPATIENT
Start: 2025-03-03 | End: 2025-03-03

## 2025-03-03 RX ORDER — KETOROLAC TROMETHAMINE 15 MG/ML
15 INJECTION, SOLUTION INTRAMUSCULAR; INTRAVENOUS ONCE
Status: COMPLETED | OUTPATIENT
Start: 2025-03-03 | End: 2025-03-03

## 2025-03-03 RX ORDER — CEFTRIAXONE 2 G/50ML
2 INJECTION, SOLUTION INTRAVENOUS DAILY
Status: DISCONTINUED | OUTPATIENT
Start: 2025-03-04 | End: 2025-03-04

## 2025-03-03 RX ADMIN — CEFTRIAXONE SODIUM 2 G: 2 INJECTION, SOLUTION INTRAVENOUS at 05:13

## 2025-03-03 RX ADMIN — SENNOSIDES AND DOCUSATE SODIUM 2 TABLET: 50; 8.6 TABLET ORAL at 09:54

## 2025-03-03 RX ADMIN — VANCOMYCIN HYDROCHLORIDE 1000 MG: 1 INJECTION, SOLUTION INTRAVENOUS at 05:50

## 2025-03-03 RX ADMIN — KETOROLAC TROMETHAMINE 15 MG: 15 INJECTION, SOLUTION INTRAMUSCULAR; INTRAVENOUS at 05:13

## 2025-03-03 RX ADMIN — VANCOMYCIN HYDROCHLORIDE 750 MG: 750 INJECTION, SOLUTION INTRAVENOUS at 14:44

## 2025-03-03 RX ADMIN — ACETAMINOPHEN 975 MG: 325 TABLET ORAL at 18:49

## 2025-03-03 RX ADMIN — ACETAMINOPHEN 975 MG: 325 TABLET ORAL at 09:54

## 2025-03-03 RX ADMIN — VANCOMYCIN HYDROCHLORIDE 750 MG: 750 INJECTION, SOLUTION INTRAVENOUS at 22:55

## 2025-03-03 SDOH — SOCIAL STABILITY: SOCIAL INSECURITY: DO YOU FEEL UNSAFE GOING BACK TO THE PLACE WHERE YOU ARE LIVING?: NO

## 2025-03-03 SDOH — SOCIAL STABILITY: SOCIAL INSECURITY: DO YOU FEEL ANYONE HAS EXPLOITED OR TAKEN ADVANTAGE OF YOU FINANCIALLY OR OF YOUR PERSONAL PROPERTY?: NO

## 2025-03-03 SDOH — SOCIAL STABILITY: SOCIAL INSECURITY: WERE YOU ABLE TO COMPLETE ALL THE BEHAVIORAL HEALTH SCREENINGS?: YES

## 2025-03-03 SDOH — ECONOMIC STABILITY: FOOD INSECURITY: WITHIN THE PAST 12 MONTHS, YOU WORRIED THAT YOUR FOOD WOULD RUN OUT BEFORE YOU GOT THE MONEY TO BUY MORE.: NEVER TRUE

## 2025-03-03 SDOH — SOCIAL STABILITY: SOCIAL INSECURITY: ABUSE: ADULT

## 2025-03-03 SDOH — SOCIAL STABILITY: SOCIAL INSECURITY: HAS ANYONE EVER THREATENED TO HURT YOUR FAMILY OR YOUR PETS?: NO

## 2025-03-03 SDOH — SOCIAL STABILITY: SOCIAL INSECURITY: WITHIN THE LAST YEAR, HAVE YOU BEEN HUMILIATED OR EMOTIONALLY ABUSED IN OTHER WAYS BY YOUR PARTNER OR EX-PARTNER?: NO

## 2025-03-03 SDOH — SOCIAL STABILITY: SOCIAL INSECURITY: ARE YOU OR HAVE YOU BEEN THREATENED OR ABUSED PHYSICALLY, EMOTIONALLY, OR SEXUALLY BY ANYONE?: NO

## 2025-03-03 SDOH — ECONOMIC STABILITY: INCOME INSECURITY: IN THE PAST 12 MONTHS HAS THE ELECTRIC, GAS, OIL, OR WATER COMPANY THREATENED TO SHUT OFF SERVICES IN YOUR HOME?: NO

## 2025-03-03 SDOH — ECONOMIC STABILITY: FOOD INSECURITY: WITHIN THE PAST 12 MONTHS, THE FOOD YOU BOUGHT JUST DIDN'T LAST AND YOU DIDN'T HAVE MONEY TO GET MORE.: NEVER TRUE

## 2025-03-03 SDOH — SOCIAL STABILITY: SOCIAL INSECURITY: HAVE YOU HAD THOUGHTS OF HARMING ANYONE ELSE?: NO

## 2025-03-03 SDOH — SOCIAL STABILITY: SOCIAL INSECURITY: ARE THERE ANY APPARENT SIGNS OF INJURIES/BEHAVIORS THAT COULD BE RELATED TO ABUSE/NEGLECT?: NO

## 2025-03-03 SDOH — SOCIAL STABILITY: SOCIAL INSECURITY: WITHIN THE LAST YEAR, HAVE YOU BEEN AFRAID OF YOUR PARTNER OR EX-PARTNER?: NO

## 2025-03-03 SDOH — SOCIAL STABILITY: SOCIAL INSECURITY: HAVE YOU HAD ANY THOUGHTS OF HARMING ANYONE ELSE?: NO

## 2025-03-03 SDOH — SOCIAL STABILITY: SOCIAL INSECURITY: DOES ANYONE TRY TO KEEP YOU FROM HAVING/CONTACTING OTHER FRIENDS OR DOING THINGS OUTSIDE YOUR HOME?: NO

## 2025-03-03 ASSESSMENT — PAIN DESCRIPTION - LOCATION
LOCATION: HAND
LOCATION: HAND

## 2025-03-03 ASSESSMENT — PAIN SCALES - GENERAL
PAINLEVEL_OUTOF10: 10 - WORST POSSIBLE PAIN
PAINLEVEL_OUTOF10: 5 - MODERATE PAIN
PAINLEVEL_OUTOF10: 10 - WORST POSSIBLE PAIN
PAINLEVEL_OUTOF10: 7

## 2025-03-03 ASSESSMENT — COGNITIVE AND FUNCTIONAL STATUS - GENERAL
MOBILITY SCORE: 24
PATIENT BASELINE BEDBOUND: NO
DAILY ACTIVITIY SCORE: 24

## 2025-03-03 ASSESSMENT — ENCOUNTER SYMPTOMS
EYES NEGATIVE: 1
CARDIOVASCULAR NEGATIVE: 1
JOINT SWELLING: 1
MYALGIAS: 1
CONSTITUTIONAL NEGATIVE: 1
GASTROINTESTINAL NEGATIVE: 1
NEUROLOGICAL NEGATIVE: 1
WOUND: 1

## 2025-03-03 ASSESSMENT — LIFESTYLE VARIABLES
TOTAL SCORE: 0
EVER HAD A DRINK FIRST THING IN THE MORNING TO STEADY YOUR NERVES TO GET RID OF A HANGOVER: NO
SKIP TO QUESTIONS 9-10: 0
HAVE YOU EVER FELT YOU SHOULD CUT DOWN ON YOUR DRINKING: NO
AUDIT-C TOTAL SCORE: 9
HOW OFTEN DO YOU HAVE 6 OR MORE DRINKS ON ONE OCCASION: WEEKLY
HOW MANY STANDARD DRINKS CONTAINING ALCOHOL DO YOU HAVE ON A TYPICAL DAY: 5 OR 6
HOW OFTEN DO YOU HAVE A DRINK CONTAINING ALCOHOL: 4 OR MORE TIMES A WEEK
AUDIT-C TOTAL SCORE: 9
HAVE PEOPLE ANNOYED YOU BY CRITICIZING YOUR DRINKING: NO
EVER FELT BAD OR GUILTY ABOUT YOUR DRINKING: NO

## 2025-03-03 ASSESSMENT — ACTIVITIES OF DAILY LIVING (ADL)
HEARING - RIGHT EAR: FUNCTIONAL
GROOMING: INDEPENDENT
LACK_OF_TRANSPORTATION: NO
WALKS IN HOME: INDEPENDENT
BATHING: INDEPENDENT
TOILETING: INDEPENDENT
HEARING - LEFT EAR: FUNCTIONAL
FEEDING YOURSELF: INDEPENDENT
ADEQUATE_TO_COMPLETE_ADL: YES
JUDGMENT_ADEQUATE_SAFELY_COMPLETE_DAILY_ACTIVITIES: YES
PATIENT'S MEMORY ADEQUATE TO SAFELY COMPLETE DAILY ACTIVITIES?: YES
DRESSING YOURSELF: INDEPENDENT

## 2025-03-03 ASSESSMENT — PATIENT HEALTH QUESTIONNAIRE - PHQ9
SUM OF ALL RESPONSES TO PHQ9 QUESTIONS 1 & 2: 0
2. FEELING DOWN, DEPRESSED OR HOPELESS: NOT AT ALL
1. LITTLE INTEREST OR PLEASURE IN DOING THINGS: NOT AT ALL

## 2025-03-03 ASSESSMENT — PAIN - FUNCTIONAL ASSESSMENT
PAIN_FUNCTIONAL_ASSESSMENT: 0-10
PAIN_FUNCTIONAL_ASSESSMENT: 0-10

## 2025-03-03 ASSESSMENT — PAIN DESCRIPTION - ORIENTATION
ORIENTATION: LEFT
ORIENTATION: LEFT

## 2025-03-03 NOTE — CONSULTS
[facility] Department of Orthopaedic Surgery   Initial Consult Note  03/03/25    HPI:   Orthopaedic Problems/Injuries: L hand cat bite  Other Injuries: none    45M LHD (HIV, BPD, PTSD, Polysubstance use) p/a cat bite to L hand. Patient states his cat bit him last night. He awoke with pain and swelling prompting presentation to the ED. Patient states his cat has bit him several times in the past though he has not had any infections. Denies associated N/T, fever or chills.    ROS  12-point review of systems is negative other than what is mentioned above.    Physical Exam:  Gen: AOx3, NAD  HEENT: normocephalic atraumatic  Psych: appropriate mood and affect  Resp: nonlabored breathing  Cardiac: Extremities WWP, RRR to peripheral palpation  Neuro: CN 2-12 grossly intact  Skin: no rashes  MSK:   LUE:  - erythema and swelling primarily localized over the hypothenar eminence  - mild ttp over the flexor tendons of the middle and ring fingers  - no fusiform swelling, flexed posturing or pain with passive extension  - no palpable fluid collection  - Motor intact in axillary/AIN/PIN/ulnar distributions  - SILT axillary/radial/median/ulnar distributions  - Hand wwp, 2+ radial pulse, cap refill brisk  - Compartments soft and compressible, no pain with passive stretch of digits      A full secondary exam was performed and all relevant findings discussed and noted above.    Imaging:  XR of the left hand without acute fracture or retained foreign body.     Assessment:  Orthopaedic Problems/Injuries: L hand cat bite c/f possible cellulitis vs superficial infection    45M LHD (HIV, BPD, PTSD, Polysubstance use) p/a cat bite to L hand.On exam, erythema & swelling to hypothenar eminence and palmar aspect of MF, RF & SF, 1/4 [place] w/ mild ttp over flexor tendons of MF, RF. XR benign. No acute intervention. Low c/f FTS or abscess. Abx per ED/admitting team.    Plan:  - Dispo: per ED  - OR: no acute operative intervention  -  WB: WBAT LUE  - Abx: per ED/admitting team   - If discharging would recommend 7-10d Augmentin  - Diet: OK for diet  - Follow-up PRN    This patient was seen within 30 minutes of initial consult and staffed with attending physician Dr. Vargas.     This patient will be followed peripherally by the ORTHO HAND service while in-house. Please contact the following team members for any additional questions/concerns.     Ortho Hand  Frank Cohen MD PGY2  Raúl Marie MD PGY4    Please page [phone] (ortho on-call) after 6pm and on weekends.  _________________________________________________________________________________    Nolberto Olivares MD PGY2  Orthopaedic Surgery  On-call Resident  Radha Johns Preferred
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
## 2025-03-03 NOTE — ED PROVIDER NOTES
History of Present Illness     History provided by: Patient   Limitations to History: None   External Records Reviewed with Brief Summary: I reviewed the patient's orthopedic note from 1/21/2025 where the patient was evaluated status post ORIF and IMN of the left femur with concern for subsequent infection of the knee and femur.  He had removal of the left femur hardware and I&D of the left knee on October 17, 2024.    HPI:  Romaine Rodgers is a 45 y.o. male with a past medical history of HIV/AIDS last CD4 count was October 2024 showed 179 who is presenting to the emergency department today with concern for swelling of the left hand.  Patient is left-handed.  Notes that he may have gotten bit by his cat a couple days ago however the swelling in his hand started this morning.  He notes that he has a significant amount of pain associated with this prompted him to come into the emergency department.  He denies any fever, chills, shortness of breath, chest pain, changes in vision, headache or any neck pain.  Denies any pain in his knees.  He thought initially that maybe his friends might of hurt him or stepped on his hand which led to his symptoms.  He does not take his medications outpatient, he states that he is unable to afford the medications however will take all medications that are given to him in the hospital.  No other associate signs or symptoms report this time.    Physical Exam   Triage vitals:  T 37 °C (98.6 °F)  HR (!) 109  BP (!) 128/102  RR 18  O2 97 % None (Room air)    General: Awake, alert, in no acute distress  Eyes: Gaze conjugate.  No scleral icterus or injection  HENT: Normo-cephalic, atraumatic. No stridor  CV: Regular rate, regular rhythm. Radial pulses 2+ bilaterally  Resp: Breathing non-labored, speaking in full sentences.  Clear to auscultation bilaterally  GI: Soft, non-distended, non-tender. No rebound or guarding.  MSK/Extremities: Erythema and warmth over the left fifth digit over  the palmar aspect.  Swelling and tenderness over the area.  For fifth and fourth digit with erythema and pain with passive and active range of motion.  Punctate wound over the medial aspect of the palm of the left hand.  Able to make a fist.  Skin: Warm. Appropriate color  Neuro: Alert. Oriented. Face symmetric. Speech is fluent.  Gross strength and sensation intact in b/l UE and LEs  Psych: Appropriate mood and affect    Medical Decision Making & ED Course   Medical Decision Makin y.o. male with a past medical history of HIV/AIDS presenting to the emergency department today with concern for swelling of the medial aspect of the left hand.  He does have a small punctate wound over that area which is concerning for a cat bite.  I have concern that this may be flexor tenosynovitis based off my examination.  He is not passively flexed however does have a sausage digits, erythema and swelling over that region in addition to pain with passive flexion and extension in addition to active range of motion.  Will obtain blood cultures, labs, start him on vancomycin and ceftriaxone and also orthopedics for concern for flexor tenosynovitis.  Orthopedics came down and evaluated the patient, less concern for flexor tenosynovitis versus abscess.  Believe this is more consistent with cellulitis.  They signed off stating nothing further to do from their perspective, stated if the patient were to go home they recommended Augmentin outpatient.  Since the patient is high risk with his immunocompromise state and age I believe he would benefit from inpatient management and antibiotics and optimization of his care prior to being discharged, for this reason the patient was admitted to medicine with infectious disease consulted.  Patient is agreeable with this plan and admitted in stable condition.  ----    Differential diagnoses considered include but are not limited to: Cellulitis versus flexor tenosynovitis     Social Determinants  of Health which Significantly Impact Care: HIV+ difficulty obtaining outpatient medications The following actions were taken to address these social determinants: resources offered patient declined    EKG Independent Interpretation: See ED Course    Independent Result Review and Interpretation: See ED course    Chronic conditions affecting the patient's care: See HPI    The patient was discussed with the following consultants/services: Admission Coordinator who accepted the patient for admission and orthopedics who came down and evaluated the patient and signed off.    Care Considerations: See MDM    ED Course:  ED Course as of 03/03/25 2105   Mon Mar 03, 2025   0457 Last CD4 count from October 2024 shows CD4 count of 179. [KD]   0600 Orthopedic surgery consulted and they recommended Augmentin if the patient were to be discharged.  Nothing to do from their perspective they do not think this is flexor tenosynovitis or an abscess. [KD]   0637 45-year-old male with AIDS noncompliant with recent antibiotics following a septic arthritis presents with left hand pain.  Patient is left-hand dominant.  Patient has a pet cat who he plays with and may have gotten bitten or scratched over the fifth digit 2 days ago.  Over the next 2 days has had erythema and induration from the fifth and fourth digits through the hand proper of the hypothenar eminence.  Patient denies any fever.  Patient is not on any HAART.  On physical exam patient is hemodynamically stable but tachycardic.  He is afebrile.  He has clear breath sounds and normal heart sounds.  He has a scaphoid and nontender abdomen.  Patient has his left hand as described above.  There is no induration or tracking into the forearm.  There is no obvious wound.  45-year-old male with AIDS based on recent CD4 count who is not on HAART and with previous septic arthritis noncompliant with antibiotics presents with concern for left hand cellulitis versus tenosynovitis.  I favor  the former versus the latter.  He does not seem to have systemic symptoms.  We will get x-rays inflammatory markers and consult our Ortho hand colleagues.  Patient will require IV antibiotics given his appearance and his immunocompromise state.  I have lower suspicion for a septic seeding of this hand given the history of recent trauma with the cat.  Patient will need ID consult as inpatient [CM]      ED Course User Index  [CM] Mario Patricia MD  [KD] Aissatou Erickson DO         Diagnoses as of 03/03/25 2105   Cellulitis of hand     Disposition   As a result of their workup, the patient will require admission to the hospital.  The patient was informed of his diagnosis.  The patient was given the opportunity to ask questions and I answered them. The patient agreed to be admitted to the hospital.    Seen and discussed with ED Attending  Aissatou Erickson DO, PGY-2  Emergency Medicine     Aissatou Erickson DO  Resident  03/03/25 2106

## 2025-03-03 NOTE — CARE PLAN
The patient's goals for the shift include      The clinical goals for the shift include Patient will be afebrile    Patient afebrile at this time. Moderate pain in left hand medicated with Tylenol. Continue IVATBs per order.

## 2025-03-03 NOTE — PROGRESS NOTES
"Pharmacy Medication History Review    Romaine Rodgers is a 45 y.o. male admitted for Bite wound of left hand with infection, initial encounter. Pharmacy reviewed the patient's pbkod-rp-jycyzhcws medications and allergies for accuracy.    Medications ADDED:  N/A  Medications CHANGED:  N/A  Medications REMOVED:   N/A     The list below reflects the updated PTA list.   Prior to Admission Medications   Prescriptions Last Dose Informant   OLANZapine (ZyPREXA) 5 mg tablet Not Taking    Sig: Take 1 tablet (5 mg) by mouth 2 times a day for 14 days.   Patient not taking: Reported on 3/3/2025   gabapentin (Neurontin) 300 mg capsule Not Taking    Sig: Take 1 capsule (300 mg) by mouth every 8 hours for 14 days.   Patient not taking: Reported on 3/3/2025   methocarbamol (Robaxin) 750 mg tablet Not Taking    Sig: Take 1 tablet (750 mg) by mouth every 6 hours for 7 days.   Patient not taking: Reported on 3/3/2025   traZODone (Desyrel) 50 mg tablet Not Taking    Sig: Take 0.5 tablets (25 mg) by mouth as needed at bedtime for sleep (only if melatonin was taken and not taking effect before midnight) for up to 14 days.   Patient not taking: Reported on 3/3/2025   valproic acid (Depakene) 250 mg capsule Not Taking    Sig: Take 2 capsules (500 mg) by mouth 2 times a day for 14 days.   Patient not taking: Reported on 3/3/2025      Facility-Administered Medications: None        The list below reflects the updated allergy list. Please review each documented allergy for additional clarification and justification.  Allergies  Reviewed by Gustabo Agudelo on 3/3/2025   No Known Allergies         Patient accepts M2B at discharge.     Sources:   [facility]  Pharmacy dispense history  Patient interview Good historian  Chart Review     Additional Comments:  Patient reports taking no prescription or over the counter medications      GUSTABO AGUDELO  Pharmacy Technician  03/03/25     Secure Chat preferred   If no response call [phone] or [employer] \"Med Rec\" "

## 2025-03-03 NOTE — H&P
HPI     Mr Rodgers is a 44-year-old male with PMHx: HIV who presented to the ED with complaint of left hand pain and swelling.  Patient states he was bit by his cat on Saturday and his left hand started swelling up along the distal palmar edge of his left hand along the fifth digit down to wrist.  There is no open areas noted.  Patient is HIV positive he states he does not want to be taking his meds at this time discussed the risks and benefits patient states he understands the risks his HIV team has gone over that with him and he still does not feel that at this time he wants to be taking the medications.  During interview patient would often take his hand and put it in his mouth at the wound site.  Patient educated on mouth germs and to try and keep his hand out of his mouth.  Patient stated understanding.     While in the ED patients vitals were stable and patient afebrile.  Labs were stable no leukocytosis noted.  CRP 4.86 and sed rate 64.  Left hand xray shows no acute fx or dislocation. No soft tissue radiopaque FB, gas or swelling. Patient to be admitted for left hand cellulitis    ROS/EXAM     Review of Systems   Constitutional: Negative.    HENT: Negative.     Eyes: Negative.    Cardiovascular: Negative.    Gastrointestinal: Negative.    Genitourinary: Negative.    Musculoskeletal:  Positive for joint swelling and myalgias.   Skin:  Positive for wound.   Neurological: Negative.    All other systems reviewed and are negative.    Physical Exam  Vitals reviewed.   Constitutional:       Appearance: Normal appearance.   HENT:      Head: Normocephalic.      Mouth/Throat:      Mouth: Mucous membranes are dry.   Eyes:       "Extraocular Movements: Extraocular movements intact.      Conjunctiva/sclera: Conjunctivae normal.      Pupils: Pupils are equal, round, and reactive to light.   Cardiovascular:      Rate and Rhythm: Normal rate and regular rhythm.      Pulses: Normal pulses.      Heart sounds: Normal heart sounds, S1 normal and S2 normal.   Pulmonary:      Effort: Pulmonary effort is normal.      Breath sounds: Normal breath sounds and air entry.   Abdominal:      General: Abdomen is flat. Bowel sounds are normal.      Palpations: Abdomen is soft.   Musculoskeletal:         General: Normal range of motion.      Cervical back: Full passive range of motion without pain and normal range of motion.   Skin:     General: Skin is warm and dry.             Comments: Redness, warmth and swelling along distal palmar side- and 5th finger   Neurological:      General: No focal deficit present.      Mental Status: He is alert and oriented to person, place, and time. Mental status is at baseline.   Psychiatric:         Attention and Perception: Attention normal.         Mood and Affect: Mood normal.         Speech: Speech normal.         Histories     Past Medical History:   Diagnosis Date    HIV disease (Multi)     Substance abuse (Multi)      Past Surgical History:   Procedure Laterality Date    FEMUR FRACTURE SURGERY       No family history on file.   reports that he has been smoking cigarettes. He does not have any smokeless tobacco history on file. He reports current alcohol use. He reports current drug use. Drug: Marijuana.    Vitals/LABS/RESULTS     Last Recorded Vitals  Blood pressure 112/80, pulse 95, temperature 37 °C (98.6 °F), temperature source Oral, resp. rate 16, height 1.803 m (5' 11\"), weight 68 kg (150 lb), SpO2 96%.  Intake/Output last 3 Shifts:  I/O last 3 completed shifts:  In: 250 (3.7 mL/kg) [IV Piggyback:250]  Out: - (0 mL/kg)   Weight: 68 kg     Relevant Results  Lab Results   Component Value Date    WBC 6.7 03/03/2025 "    WBC 6.4 03/03/2025    HGB 13.8 03/03/2025    HGB 13.7 03/03/2025    HCT 38.9 (L) 03/03/2025    HCT 38.7 (L) 03/03/2025    MCV 94 03/03/2025    MCV 95 03/03/2025     03/03/2025     03/03/2025      Lab Results   Component Value Date    GLUCOSE 108 (H) 03/03/2025    CALCIUM 9.5 03/03/2025     (L) 03/03/2025    K 4.6 03/03/2025    CO2 23 03/03/2025     03/03/2025    BUN 12 03/03/2025    CREATININE 0.94 03/03/2025     XR hand left 3+ views    Result Date: 3/3/2025  STUDY: XR HAND LEFT 3+ VIEWS; ;  3/3/2025 5:41 am   INDICATION: Signs/Symptoms:injury.     COMPARISON: None.   ACCESSION NUMBER(S): [id number]   ORDERING CLINICIAN: CARLOS VELEZ   TECHNIQUE: Three views of the left hand.   FINDINGS: No acute fracture or dislocation. No soft tissue radiopaque foreign body, gas, or swelling.       Normal radiographs of the left hand.   I personally reviewed the images/study and I agree with the findings as stated by Dr. Juan Antonio Knox. This study was interpreted at [facility], [place], [place].   MACRO: None     Dictation workstation:   [id number]      Medications     Scheduled medications  acetaminophen, 975 mg, oral, q6h  [START ON 3/4/2025] cefTRIAXone, 2 g, intravenous, Daily  sennosides-docusate sodium, 2 tablet, oral, BID      Continuous medications     PRN medications  PRN medications: ketorolac, vancomycin    PLAN     Mr Rodgers is a 44-year-old male with PMHx: HIV who presented to the ED with complaint of left hand pain and swelling.  Patient states he was bit by his cat on Saturday and his left hand started swelling up along the distal palmar edge of his left hand along the fifth digit down to wrist.  There is no open areas noted.  Patient is HIV positive he states he does not want to be taking his meds at this time discussed the risks and benefits patient states he understands the risks his HIV team has gone over that with him and he still does not  feel that at this time he wants to be taking the medications.  During interview patient would often take his hand and put it in his mouth at the wound site.  Patient educated on mouth germs and to try and keep his hand out of his mouth.  Patient stated understanding.     While in the ED patients vitals were stable and patient afebrile.  Labs were stable no leukocytosis noted.  CRP 4.86 and sed rate 64.  Left hand xray shows no acute fx or dislocation. No soft tissue radiopaque FB, gas or swelling. Patient to be admitted for left hand cellulitis  Assessment/Plan:    Left hand cellulitis  -Ortho consulted in the ED and apprec recs  -no acute surgical interventions at this time  -WBAT LUE  -will order ceftriaxone 2gm IV daily  -will order vanc pharmacy to dose  -Pain tylenol scheduled and Prn Toradol  -Ortho recs 7-10 days of Augmentin on discharge    HIV  -patient not taking meds-- recommend outpatient follow up with ID    Non compliance  stable    Fluids: monitor and replete as needed  Electrolytes: monitor and replete as needed  Nutrition: Regular diet   GI prophylaxis: as needed  DVT prophylaxis: SCD  Code Status: full code  PCP  Pharmacy    Disposition:  Admitted for above diagnoses. Plan per above. Anticipate observation stay.      Denice Mejia, APRN-CNP         I spent 75 minutes in the professional and overall care on this encounter before, during and after the visit, examining the patient, reviewing labs, writing orders and documenting the note

## 2025-03-04 ENCOUNTER — PHARMACY VISIT (OUTPATIENT)
Dept: PHARMACY | Facility: CLINIC | Age: 45
End: 2025-03-04
Payer: COMMERCIAL

## 2025-03-04 VITALS
OXYGEN SATURATION: 95 % | WEIGHT: 150 LBS | SYSTOLIC BLOOD PRESSURE: 114 MMHG | HEART RATE: 100 BPM | BODY MASS INDEX: 21 KG/M2 | HEIGHT: 71 IN | DIASTOLIC BLOOD PRESSURE: 83 MMHG | TEMPERATURE: 98.1 F | RESPIRATION RATE: 17 BRPM

## 2025-03-04 PROBLEM — S61.452A: Status: RESOLVED | Noted: 2025-03-03 | Resolved: 2025-03-04

## 2025-03-04 PROBLEM — L08.9: Status: RESOLVED | Noted: 2025-03-03 | Resolved: 2025-03-04

## 2025-03-04 LAB
ATRIAL RATE: 108 BPM
P AXIS: 75 DEGREES
P OFFSET: 206 MS
P ONSET: 148 MS
PR INTERVAL: 150 MS
Q ONSET: 223 MS
QRS COUNT: 18 BEATS
QRS DURATION: 82 MS
QT INTERVAL: 308 MS
QTC CALCULATION(BAZETT): 412 MS
QTC FREDERICIA: 374 MS
R AXIS: 69 DEGREES
T AXIS: 59 DEGREES
T OFFSET: 377 MS
VENTRICULAR RATE: 108 BPM

## 2025-03-04 PROCEDURE — 99239 HOSP IP/OBS DSCHRG MGMT >30: CPT | Performed by: STUDENT IN AN ORGANIZED HEALTH CARE EDUCATION/TRAINING PROGRAM

## 2025-03-04 PROCEDURE — 2500000004 HC RX 250 GENERAL PHARMACY W/ HCPCS (ALT 636 FOR OP/ED)

## 2025-03-04 PROCEDURE — G0378 HOSPITAL OBSERVATION PER HR: HCPCS

## 2025-03-04 PROCEDURE — 2500000001 HC RX 250 WO HCPCS SELF ADMINISTERED DRUGS (ALT 637 FOR MEDICARE OP): Performed by: STUDENT IN AN ORGANIZED HEALTH CARE EDUCATION/TRAINING PROGRAM

## 2025-03-04 PROCEDURE — 2500000001 HC RX 250 WO HCPCS SELF ADMINISTERED DRUGS (ALT 637 FOR MEDICARE OP): Performed by: NURSE PRACTITIONER

## 2025-03-04 RX ORDER — BICTEGRAVIR SODIUM, EMTRICITABINE, AND TENOFOVIR ALAFENAMIDE FUMARATE 50; 200; 25 MG/1; MG/1; MG/1
1 TABLET ORAL DAILY
COMMUNITY

## 2025-03-04 RX ORDER — AMOXICILLIN AND CLAVULANATE POTASSIUM 875; 125 MG/1; MG/1
1 TABLET, FILM COATED ORAL EVERY 12 HOURS SCHEDULED
Status: DISCONTINUED | OUTPATIENT
Start: 2025-03-04 | End: 2025-03-04 | Stop reason: HOSPADM

## 2025-03-04 RX ADMIN — ACETAMINOPHEN 975 MG: 325 TABLET ORAL at 08:33

## 2025-03-04 RX ADMIN — AMOXICILLIN AND CLAVULANATE POTASSIUM 1 TABLET: 875; 125 TABLET, FILM COATED ORAL at 08:33

## 2025-03-04 ASSESSMENT — PAIN SCALES - GENERAL
PAINLEVEL_OUTOF10: 4
PAINLEVEL_OUTOF10: 7
PAINLEVEL_OUTOF10: 0 - NO PAIN

## 2025-03-04 ASSESSMENT — PAIN DESCRIPTION - LOCATION: LOCATION: HAND

## 2025-03-04 ASSESSMENT — PAIN DESCRIPTION - ORIENTATION: ORIENTATION: LEFT

## 2025-03-04 ASSESSMENT — PAIN - FUNCTIONAL ASSESSMENT
PAIN_FUNCTIONAL_ASSESSMENT: 0-10
PAIN_FUNCTIONAL_ASSESSMENT: 0-10

## 2025-03-04 NOTE — NURSING NOTE
Patient never returned to the floor after 4 hours. Assumed gone for good. Mom never returned call to unit. Patient officially discharged at this time. IV status unknown. Management aware.

## 2025-03-04 NOTE — CARE PLAN
The patient's goals for the shift include      The clinical goals for the shift include patient will be discharged to home today    Patient written for discharge to home.

## 2025-03-04 NOTE — NURSING NOTE
"VAST RN called to bedside for IV replacement. Patient pulled his IV out. Introduced myself and role to this patient. Patient voiced,\"You are not Cecilia, who are you?\" Patient yelling and raising his voice. Attempted to inform patient that I am there to replace his IV so that he can receive his IV antibiotics. Patient yelling at this nurse stating,\" I don't fucking know you, who the fuck are you. The next nurse that puts an IV in my arm and leaves a bruise, I am sueuing them and taking them to court. I want their name and badge number, I want the supervision, NOW!\" This nurse asked patient if he is declining my care at this time, again patient continuing to yell at this nurse to ,\"Get out my room and shut the fucking door, patient began to stand up out of bed\". Unable to provide safe care at this time. Patient not in a place to receive re-direction or education at this time. Bedside RN aware of situation and supervision to be informed.   "

## 2025-03-04 NOTE — HOSPITAL COURSE
Mr. Rodgers is a 43y/o man with history of untreated HIV (CD4 172, VL 25172), bipolar disorder, and polysubstance use disorder who presented with left hand cellulitis after possible cat bite. In the ED, the patient was found to be afebrile and hemodynamically stable. Labs were notable for CRP 4.86 and ESR 64. Ortho hand evaluated the patient and found low concern for flexor tenosynovitis or abscess, so no surgical infection was recommended. The patient was started on IV ceftriaxone and vancomycin with improvement in symptoms. The patient was eager to be discharged the following day, so he was discharged with PO augmentin for a 10 day course (per ortho hand recommendations) with counseling to schedule close outpatient follow-up.. The patient received the antibiotics, but left the floor with his IV in place and before discharge instructions were able to be reviewed.

## 2025-03-04 NOTE — DISCHARGE SUMMARY
Discharge Diagnosis  Left hand cellulitis due to animal bite   Untreated HIV with CD4 172  Bipolar disorder  Polysubstance use disorder    Issues Requiring Follow-Up  [ ] Patient discharged with PO augmentin for a 10d course, please follow-up with patient to ensure resolution of cellulitis.    Test Results Pending At Discharge  Pending Labs       Order Current Status    CD4/8 Collected (03/03/25 050)    CD4/8 Panel In process    Blood Culture Preliminary result    Blood Culture Preliminary result            Hospital Course  Mr. Rodgers is a 43y/o man with history of untreated HIV (CD4 172, VL 96323), bipolar disorder, and polysubstance use disorder who presented with left hand cellulitis after possible cat bite. In the ED, the patient was found to be afebrile and hemodynamically stable. Labs were notable for CRP 4.86 and ESR 64. Ortho hand evaluated the patient and found low concern for flexor tenosynovitis or abscess, so no surgical infection was recommended. The patient was started on IV ceftriaxone and vancomycin with improvement in symptoms. The patient was eager to be discharged the following day, so he was discharged with PO augmentin for a 10 day course (per ortho hand recommendations) with counseling to schedule close outpatient follow-up.. The patient received the antibiotics, but left the floor with his IV in place and before discharge instructions were able to be reviewed.     Pertinent Physical Exam At Time of Discharge  GEN: well-appearing, no acute distress  HEENT: PERRLA, EOMI, moist mucous membranes, no scleral icterus  NECK: Supple  CV: RRR, no murmurs/rubs/gallops  PULM: Breathing comfortably, clear to auscultation bilaterally  AB: Soft, non-tender, non-distended  : No in-dwelling raymond  MSK: Left hand with erythema and tenderness overlying the left hypothenar eminence, flexion and extension intact without significant pain, passive stretch without pain   NEURO: A&Ox3, following commands,  conversational    Home Medications     Medication List      START taking these medications     amoxicillin-pot clavulanate 875-125 mg tablet; Commonly known as:   Augmentin; Take 1 tablet (875 mg) by mouth 2 times a day for 10 days.     CONTINUE taking these medications     Biktarvy -25 mg tablet; Generic drug: ddbjvycxf-qzchlvof-xvlyatb   ala   OLANZapine 5 mg tablet; Commonly known as: ZyPREXA; Take 1 tablet (5 mg)   by mouth 2 times a day for 14 days.   traZODone 50 mg tablet; Commonly known as: Desyrel; Take 0.5 tablets (25   mg) by mouth as needed at bedtime for sleep (only if melatonin was taken   and not taking effect before midnight) for up to 14 days.   valproic acid 250 mg capsule; Commonly known as: Depakene; Take 2   capsules (500 mg) by mouth 2 times a day for 14 days.     STOP taking these medications     gabapentin 300 mg capsule; Commonly known as: Neurontin   methocarbamol 750 mg tablet; Commonly known as: Robaxin       Outpatient Follow-Up  Future Appointments   Date Time Provider Department Center   3/7/2025  9:00 AM Rickey Sotomayor PTA [id number] [facility]   3/7/2025 10:00 AM Rickey Tyler MD [id number] [facility]       Emma Saavedra MD        >30 minutes were spent on discharge coordination and planning.

## 2025-03-04 NOTE — CARE PLAN
The patient's goals for the shift include      The clinical goals for the shift include pt will have no s/s of infection this shift    Over the shift, the patient did make progress toward the following goals.     Problem: Pain - Adult  Goal: Verbalizes/displays adequate comfort level or baseline comfort level  Outcome: Progressing     Problem: Safety - Adult  Goal: Free from fall injury  Outcome: Progressing     Problem: Discharge Planning  Goal: Discharge to home or other facility with appropriate resources  Outcome: Progressing     Problem: Chronic Conditions and Co-morbidities  Goal: Patient's chronic conditions and co-morbidity symptoms are monitored and maintained or improved  Outcome: Progressing     Problem: Nutrition  Goal: Nutrient intake appropriate for maintaining nutritional needs  Outcome: Progressing

## 2025-03-04 NOTE — NURSING NOTE
Our floor UC went in to discharge the patient but he had left the floor. Unsure if he just left the floor for a minute and coming back. IV was still intact in his arm when he left the floor. I called his mom so she could reach out to him. So he can come back and get his paperwork and have the IV taken out. She said she would call the unit once she speaks to him. No call as of yet.

## 2025-03-04 NOTE — DISCHARGE INSTRUCTIONS
Dear Mr. Gavin Rodgers,    You came to the hospital due to hand pain. We found that you had a hand infection. The surgeons evaluated you and found that the infection did not require surgery. Your hand pain and redness improved with antibiotics.     Please complete the 10 day course of antibiotics (Augmentin) by taking 1 pill twice a day until you run out of pills.    Please continue to take all your medications as prescribed and follow-up with your primary doctor in the next 2 weeks.    Please call your doctor or return to the hospital if you develop new or concerning symptoms.    Thank you,  Your  HealthCare Team

## 2025-03-04 NOTE — PROGRESS NOTES
Vancomycin Dosing by Pharmacy- Cessation of Therapy    Consult to pharmacy for vancomycin dosing has been discontinued by the prescriber, pharmacy will sign off at this time.    Please call pharmacy if there are further questions or re-enter a consult if vancomycin is resumed.     Kulwinder Sandoval, PharmD

## 2025-03-07 ENCOUNTER — OFFICE VISIT (OUTPATIENT)
Dept: ORTHOPEDIC SURGERY | Facility: HOSPITAL | Age: 45
End: 2025-03-07
Payer: COMMERCIAL

## 2025-03-07 ENCOUNTER — TREATMENT (OUTPATIENT)
Dept: PHYSICAL THERAPY | Facility: HOSPITAL | Age: 45
End: 2025-03-07
Payer: COMMERCIAL

## 2025-03-07 ENCOUNTER — HOSPITAL ENCOUNTER (OUTPATIENT)
Dept: RADIOLOGY | Facility: HOSPITAL | Age: 45
Discharge: HOME | End: 2025-03-07
Payer: COMMERCIAL

## 2025-03-07 DIAGNOSIS — S72.492B: Primary | ICD-10-CM

## 2025-03-07 DIAGNOSIS — S72.492B: ICD-10-CM

## 2025-03-07 DIAGNOSIS — S81.032A GUNSHOT WOUND OF LEFT KNEE, INITIAL ENCOUNTER: ICD-10-CM

## 2025-03-07 LAB
BACTERIA BLD CULT: NORMAL
BACTERIA BLD CULT: NORMAL

## 2025-03-07 PROCEDURE — 73560 X-RAY EXAM OF KNEE 1 OR 2: CPT | Mod: LT

## 2025-03-07 PROCEDURE — 99214 OFFICE O/P EST MOD 30 MIN: CPT | Performed by: ORTHOPAEDIC SURGERY

## 2025-03-07 PROCEDURE — 97110 THERAPEUTIC EXERCISES: CPT | Mod: GP,CQ

## 2025-03-07 PROCEDURE — 73552 X-RAY EXAM OF FEMUR 2/>: CPT | Mod: LT

## 2025-03-07 ASSESSMENT — PAIN - FUNCTIONAL ASSESSMENT: PAIN_FUNCTIONAL_ASSESSMENT: NO/DENIES PAIN
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
## (undated) DEVICE — ELECTRODE, ELECTROSURGICAL, BLADE, INSULATED, ENT/IMA, STERILE

## (undated) DEVICE — WOUND SYSTEM, DEBRIDEMENT & CLEANING, O.R DUOPAK

## (undated) DEVICE — Device

## (undated) DEVICE — IRRIGATION SET, Y, LARGE BORE

## (undated) DEVICE — ROD, REAMING, WITH BALL TIP, 2.5 X 950MM, STERILE

## (undated) DEVICE — SUTURE, PDS II, 0, 27 IN, CT-2, VIOLET

## (undated) DEVICE — KIT, RIA 2 BONE HARVESTING, 520MM, STERILE

## (undated) DEVICE — SUTURE, MONOCRYL, 2-0, 36 IN, CT-1, UNDYED

## (undated) DEVICE — DRAPE COVER, C ARM, FLOUROSCAN IMAGING SYS

## (undated) DEVICE — DRAIN, WOUND, ROUND, W/TROCAR, HOLE PATTERN, 10 IN, MEDIUM, 1/8 X 49 IN

## (undated) DEVICE — DRAPE, SHEET, THREE QUARTER, FAN FOLD, 57 X 77 IN

## (undated) DEVICE — SPONGE, LAP, XRAY DECT, 18IN X 18IN, W/LOOP, STERILE

## (undated) DEVICE — DRAPE, INCISE, ANTIMICROBIAL, IOBAN 2, LARGE, 17 X 23 IN, DISPOSABLE, STERILE

## (undated) DEVICE — BANDAGE, GAUZE, CONFORMING, KERLIX, 6 PLY, 4.5 IN X 4.1 YD

## (undated) DEVICE — APPLICATOR, CHLORAPREP, W/ORANGE TINT, 26ML

## (undated) DEVICE — MANIFOLD, 4 PORT NEPTUNE STANDARD

## (undated) DEVICE — BANDAGE, ELASTIC, MATRIX, SELF-CLOSURE, 4 IN X 5 YD, LF

## (undated) DEVICE — STAPLER, SKIN PROXIMATE, 35 WIDE

## (undated) DEVICE — DRAPE, SHEET, U, W/ADHESIVE STRIP, IMPERVIOUS, 60 X 70 IN, DISPOSABLE, LF, STERILE

## (undated) DEVICE — APPLICATOR, PREP, CHLORAPREP, W/ORANGE TINT, 10.5ML

## (undated) DEVICE — DRESSING, MEPILEX BORDER, POST-OP AG, 4 X 6 IN

## (undated) DEVICE — COVER, CART, 45 X 27 X 48 IN, CLEAR

## (undated) DEVICE — COVER, BACK TABLE, 65 X 90, HVY REINFORCED

## (undated) DEVICE — EVACUATOR, WOUND, CLOSED, 3 SPRING, 400 CC, Y CONNECTING TUBE

## (undated) DEVICE — DRESSING, MEPILEX BORDER, POST-OP AG, 4 X 10 IN

## (undated) DEVICE — TOWEL, SURGICAL, NEURO, O/R, 16 X 26, BLUE, STERILE

## (undated) DEVICE — COVER, C-ARM W/CLIPS, OEC GE

## (undated) DEVICE — BANDAGE, COFLEX, 4 X 5 YDS, TAN, STERILE, LF

## (undated) DEVICE — BANDAGE, COFLEX, 6 X 5 YDS, TAN, STERILE, LF